# Patient Record
Sex: MALE | Race: WHITE | NOT HISPANIC OR LATINO | Employment: PART TIME | URBAN - METROPOLITAN AREA
[De-identification: names, ages, dates, MRNs, and addresses within clinical notes are randomized per-mention and may not be internally consistent; named-entity substitution may affect disease eponyms.]

---

## 2018-08-24 ENCOUNTER — OFFICE VISIT (OUTPATIENT)
Dept: NEUROSURGERY | Facility: CLINIC | Age: 33
End: 2018-08-24

## 2018-08-24 VITALS — OXYGEN SATURATION: 99 % | WEIGHT: 155 LBS | RESPIRATION RATE: 18 BRPM | HEIGHT: 68 IN | BODY MASS INDEX: 23.49 KG/M2

## 2018-08-24 DIAGNOSIS — M41.20 SCOLIOSIS (AND KYPHOSCOLIOSIS), IDIOPATHIC: Primary | ICD-10-CM

## 2018-08-24 PROCEDURE — 99203 OFFICE O/P NEW LOW 30 MIN: CPT | Performed by: NEUROLOGICAL SURGERY

## 2018-08-24 RX ORDER — CARVEDILOL 6.25 MG/1
TABLET ORAL
Refills: 5 | COMMUNITY
Start: 2018-08-14 | End: 2019-01-28

## 2018-08-24 RX ORDER — ALBUTEROL SULFATE 90 UG/1
AEROSOL, METERED RESPIRATORY (INHALATION)
Refills: 2 | COMMUNITY
Start: 2018-08-13 | End: 2019-01-28

## 2018-08-24 RX ORDER — PREDNISONE 10 MG/1
TABLET ORAL
Refills: 0 | COMMUNITY
Start: 2018-07-30 | End: 2019-01-28

## 2018-08-24 RX ORDER — MELOXICAM 15 MG/1
TABLET ORAL DAILY
Refills: 0 | COMMUNITY
Start: 2018-07-30 | End: 2019-01-28

## 2018-08-24 RX ORDER — FLUTICASONE PROPIONATE 50 MCG
SPRAY, SUSPENSION (ML) NASAL
Refills: 3 | COMMUNITY
Start: 2018-08-21 | End: 2019-01-28

## 2018-08-24 RX ORDER — MONTELUKAST SODIUM 10 MG/1
TABLET ORAL
Refills: 6 | COMMUNITY
Start: 2018-08-21 | End: 2019-01-28

## 2018-08-24 NOTE — PROGRESS NOTES
NAME: CHANDLER KING   DOS: 2018  : 1985  PCP: Yinka Santiago DO    Chief Complaint:  Back pain  Chief Complaint   Patient presents with   • Neck Pain       History of Present Illness:  32 y.o. male   This is a 32-year-old gentleman who works at [employer] and has been off work since April.  His worker for 14 years and is experienced midthoracic back pain there is no evidence radicular component it began about 2 years ago and it's worse when working.  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  He is a smoker    PMHX  Allergies:  Allergies   Allergen Reactions   • Penicillins Unknown (See Comments)     Unknown reaction     Medications    Current Outpatient Prescriptions:   •  carvedilol (COREG) 6.25 MG tablet, TK 1 T PO BID, Disp: , Rfl: 5  •  fluticasone (FLONASE) 50 MCG/ACT nasal spray, USE 1 TO 2 SPRAYS IN BOTH NOSTRILS ONCE A DAY UTD, Disp: , Rfl: 3  •  meloxicam (MOBIC) 15 MG tablet, Take  by mouth Daily., Disp: , Rfl: 0  •  montelukast (SINGULAIR) 10 MG tablet, TK 1 T PO HS, Disp: , Rfl: 6  •  predniSONE (DELTASONE) 10 MG tablet, , Disp: , Rfl: 0  •  VENTOLIN  (90 Base) MCG/ACT inhaler, INL 1 TO 2 PUFFS PO QID PRN, Disp: , Rfl: 2  Past Medical History:  History reviewed. No pertinent past medical history.  Past Surgical History:  History reviewed. No pertinent surgical history.  Social Hx:  Social History   Substance Use Topics   • Smoking status: Never Smoker   • Smokeless tobacco: Never Used   • Alcohol use Yes     Family Hx:  Family History   Problem Relation Age of Onset   • Arthritis Mother    • Arthritis Father      Review of Systems:        Review of Systems   Constitutional: Negative for activity change, appetite change, chills, diaphoresis, fatigue, fever and unexpected  weight change.   HENT: Negative for congestion, dental problem, drooling, ear discharge, ear pain, facial swelling, hearing loss, mouth sores, nosebleeds, postnasal drip, rhinorrhea, sinus pressure, sneezing, sore throat, tinnitus, trouble swallowing and voice change.    Eyes: Negative for photophobia, pain, discharge, redness, itching and visual disturbance.   Respiratory: Negative for apnea, cough, choking, chest tightness, shortness of breath, wheezing and stridor.    Cardiovascular: Negative for chest pain, palpitations and leg swelling.   Gastrointestinal: Negative for abdominal distention, abdominal pain, anal bleeding, blood in stool, constipation, diarrhea, nausea, rectal pain and vomiting.   Endocrine: Negative for cold intolerance, heat intolerance, polydipsia, polyphagia and polyuria.   Genitourinary: Negative for decreased urine volume, difficulty urinating, dysuria, enuresis, flank pain, frequency, genital sores, hematuria and urgency.   Musculoskeletal: Positive for myalgias, neck pain and neck stiffness. Negative for arthralgias, back pain, gait problem and joint swelling.   Skin: Negative for color change, pallor, rash and wound.   Allergic/Immunologic: Negative for environmental allergies, food allergies and immunocompromised state.   Neurological: Negative for dizziness, tremors, seizures, syncope, facial asymmetry, speech difficulty, weakness, light-headedness, numbness and headaches.   Hematological: Negative for adenopathy. Does not bruise/bleed easily.   Psychiatric/Behavioral: Negative for agitation, behavioral problems, confusion, decreased concentration, dysphoric mood, hallucinations, self-injury, sleep disturbance and suicidal ideas. The patient is not nervous/anxious and is not hyperactive.    All other systems reviewed and are negative.   I have reviewed this note template and all pertinent parts of the review of systems social, family history, surgical history and medication  list        Physical Examination:  Vitals:    08/24/18 1542   Resp: 18   SpO2: 99%      General Appearance:   Well developed, well nourished, well groomed, alert, and cooperative.  Neurological examination:  Neurologic Exam  Vital signs were reviewed and documented in the chart  Patient appeared in good neurologic function with normal comprehension fluent speech  Mood and affect are normal  Sense of smell deferred    Pupils symmetric equally reactive funduscopic exam not visualized   Visual fields intact to confrontation  Extraocular movements intact  Face motor function is symmetric  Facial sensations normal  Hearing intact to finger rub hearing intact to finger rub  Tongue is midline  Palate symmetric  Swallowing normal  Shoulder shrug normal    Muscle bulk and tone normal  5 out of 5 strength no motor drift  Gait normal intact  Negative Romberg  No clonus long tract signs or myelopathy    Reflexes symmetric  No edema noted and extremities skin appears normal  No thoracic sensory level vibratory sensations normal  Straight leg raise sign absent  No signs of intrinsic hip dysfunction  Back is without any lesions or abnormality  Feet are warm and well perfused  Scoliotic curvature noted which is moderate      Review of Imaging/DATA:  I reviewed MRIs of the thoracic spine I see no surgical lesions the neural foramina are open there is a Schmorl's node in the lower thoracic area but no edema  Diagnoses/Plan:    Mr. Knight is a 32 y.o. male   Thoracic back pain some scoliosis this is nonsurgical.  I recommended referral to and agree with the recommendations for interventional pain management of explained about yoga heat massage I's traction etc. if he hits a brick wall regarding his ongoing management of this condition perhaps a referral to a scoliosis specialist given his relatively young age would be in order but I still believe that he is unlikely to be a surgical candidate.  It would be a pleasure to see him back  anytime in the future I counseled him for 20 minutes on smoking cessation as well

## 2019-01-28 ENCOUNTER — APPOINTMENT (OUTPATIENT)
Dept: CT IMAGING | Facility: HOSPITAL | Age: 34
End: 2019-01-28

## 2019-01-28 ENCOUNTER — HOSPITAL ENCOUNTER (INPATIENT)
Facility: HOSPITAL | Age: 34
LOS: 3 days | Discharge: HOME | End: 2019-01-31
Attending: EMERGENCY MEDICINE | Admitting: INTERNAL MEDICINE

## 2019-01-28 DIAGNOSIS — R56.9 ALCOHOL WITHDRAWAL SEIZURE WITH COMPLICATION (HCC): Primary | ICD-10-CM

## 2019-01-28 DIAGNOSIS — F10.939 ALCOHOL WITHDRAWAL SEIZURE WITH COMPLICATION (HCC): Primary | ICD-10-CM

## 2019-01-28 PROBLEM — D69.6 THROMBOCYTOPENIA (HCC): Status: ACTIVE | Noted: 2019-01-28

## 2019-01-28 PROBLEM — F19.10 POLYSUBSTANCE ABUSE: Status: ACTIVE | Noted: 2019-01-28

## 2019-01-28 PROBLEM — R74.8 ELEVATED LIVER ENZYMES: Status: ACTIVE | Noted: 2019-01-28

## 2019-01-28 PROBLEM — K70.10 ALCOHOLIC HEPATITIS: Status: ACTIVE | Noted: 2019-01-28

## 2019-01-28 LAB
ALBUMIN SERPL-MCNC: 4.49 G/DL (ref 3.2–4.8)
ALBUMIN/GLOB SERPL: 1.8 G/DL (ref 1.5–2.5)
ALP SERPL-CCNC: 173 U/L (ref 25–100)
ALT SERPL W P-5'-P-CCNC: 176 U/L (ref 7–40)
ANION GAP SERPL CALCULATED.3IONS-SCNC: 13 MMOL/L (ref 3–11)
APTT PPP: 32.3 SECONDS (ref 24–37)
AST SERPL-CCNC: 212 U/L (ref 0–33)
BASOPHILS # BLD AUTO: 0.09 10*3/MM3 (ref 0–0.2)
BASOPHILS NFR BLD AUTO: 0.9 % (ref 0–1)
BILIRUB SERPL-MCNC: 1.1 MG/DL (ref 0.3–1.2)
BUN BLD-MCNC: 6 MG/DL (ref 9–23)
BUN/CREAT SERPL: 7.9 (ref 7–25)
CALCIUM SPEC-SCNC: 8.8 MG/DL (ref 8.7–10.4)
CHLORIDE SERPL-SCNC: 98 MMOL/L (ref 99–109)
CO2 SERPL-SCNC: 21 MMOL/L (ref 20–31)
CREAT BLD-MCNC: 0.76 MG/DL (ref 0.6–1.3)
DEPRECATED RDW RBC AUTO: 48.4 FL (ref 37–54)
EOSINOPHIL # BLD AUTO: 0.05 10*3/MM3 (ref 0–0.3)
EOSINOPHIL NFR BLD AUTO: 0.5 % (ref 0–3)
ERYTHROCYTE [DISTWIDTH] IN BLOOD BY AUTOMATED COUNT: 14.2 % (ref 11.3–14.5)
GFR SERPL CREATININE-BSD FRML MDRD: 119 ML/MIN/1.73
GLOBULIN UR ELPH-MCNC: 2.5 GM/DL
GLUCOSE BLD-MCNC: 137 MG/DL (ref 70–100)
HCT VFR BLD AUTO: 48.8 % (ref 38.9–50.9)
HGB BLD-MCNC: 17.1 G/DL (ref 13.1–17.5)
HOLD SPECIMEN: NORMAL
HOLD SPECIMEN: NORMAL
IMM GRANULOCYTES # BLD AUTO: 0.03 10*3/MM3 (ref 0–0.03)
IMM GRANULOCYTES NFR BLD AUTO: 0.3 % (ref 0–0.6)
INR PPP: 0.95 (ref 0.85–1.16)
INR PPP: 1.02 (ref 0.85–1.16)
LARGE PLATELETS: NORMAL
LIPASE SERPL-CCNC: 97 U/L (ref 6–51)
LYMPHOCYTES # BLD AUTO: 1.04 10*3/MM3 (ref 0.6–4.8)
LYMPHOCYTES NFR BLD AUTO: 10 % (ref 24–44)
MAGNESIUM SERPL-MCNC: 2.1 MG/DL (ref 1.3–2.7)
MCH RBC QN AUTO: 32.6 PG (ref 27–31)
MCHC RBC AUTO-ENTMCNC: 35 G/DL (ref 32–36)
MCV RBC AUTO: 93.1 FL (ref 80–99)
MONOCYTES # BLD AUTO: 1.07 10*3/MM3 (ref 0–1)
MONOCYTES NFR BLD AUTO: 10.3 % (ref 0–12)
NEUTROPHILS # BLD AUTO: 8.15 10*3/MM3 (ref 1.5–8.3)
NEUTROPHILS NFR BLD AUTO: 78.3 % (ref 41–71)
NRBC BLD AUTO-RTO: 0 /100 WBC (ref 0–0)
PLATELET # BLD AUTO: 50 10*3/MM3 (ref 150–450)
POTASSIUM BLD-SCNC: 4.4 MMOL/L (ref 3.5–5.5)
PROT SERPL-MCNC: 7 G/DL (ref 5.7–8.2)
PROTHROMBIN TIME: 12.2 SECONDS (ref 11.2–14.3)
PROTHROMBIN TIME: 12.9 SECONDS (ref 11.2–14.3)
RBC # BLD AUTO: 5.24 10*6/MM3 (ref 4.2–5.76)
RBC MORPH BLD: NORMAL
SODIUM BLD-SCNC: 132 MMOL/L (ref 132–146)
TROPONIN I SERPL-MCNC: <0.006 NG/ML
TSH SERPL DL<=0.05 MIU/L-ACNC: 2.43 MIU/ML (ref 0.35–5.35)
WBC MORPH BLD: NORMAL
WBC NRBC COR # BLD: 10.4 10*3/MM3 (ref 3.5–10.8)
WHOLE BLOOD HOLD SPECIMEN: NORMAL
WHOLE BLOOD HOLD SPECIMEN: NORMAL

## 2019-01-28 PROCEDURE — 85730 THROMBOPLASTIN TIME PARTIAL: CPT | Performed by: EMERGENCY MEDICINE

## 2019-01-28 PROCEDURE — 85610 PROTHROMBIN TIME: CPT | Performed by: FAMILY MEDICINE

## 2019-01-28 PROCEDURE — 99285 EMERGENCY DEPT VISIT HI MDM: CPT

## 2019-01-28 PROCEDURE — 80306 DRUG TEST PRSMV INSTRMNT: CPT | Performed by: NURSE PRACTITIONER

## 2019-01-28 PROCEDURE — 83735 ASSAY OF MAGNESIUM: CPT | Performed by: EMERGENCY MEDICINE

## 2019-01-28 PROCEDURE — 85025 COMPLETE CBC W/AUTO DIFF WBC: CPT | Performed by: EMERGENCY MEDICINE

## 2019-01-28 PROCEDURE — 84443 ASSAY THYROID STIM HORMONE: CPT | Performed by: NURSE PRACTITIONER

## 2019-01-28 PROCEDURE — 25010000002 LORAZEPAM PER 2 MG: Performed by: EMERGENCY MEDICINE

## 2019-01-28 PROCEDURE — 80053 COMPREHEN METABOLIC PANEL: CPT | Performed by: EMERGENCY MEDICINE

## 2019-01-28 PROCEDURE — 70450 CT HEAD/BRAIN W/O DYE: CPT

## 2019-01-28 PROCEDURE — 99223 1ST HOSP IP/OBS HIGH 75: CPT | Performed by: FAMILY MEDICINE

## 2019-01-28 PROCEDURE — 85007 BL SMEAR W/DIFF WBC COUNT: CPT | Performed by: EMERGENCY MEDICINE

## 2019-01-28 PROCEDURE — 83690 ASSAY OF LIPASE: CPT | Performed by: EMERGENCY MEDICINE

## 2019-01-28 PROCEDURE — 93005 ELECTROCARDIOGRAM TRACING: CPT | Performed by: EMERGENCY MEDICINE

## 2019-01-28 PROCEDURE — 25010000002 LORAZEPAM PER 2 MG: Performed by: NURSE PRACTITIONER

## 2019-01-28 PROCEDURE — 84484 ASSAY OF TROPONIN QUANT: CPT | Performed by: EMERGENCY MEDICINE

## 2019-01-28 PROCEDURE — 25010000002 MAGNESIUM SULFATE PER 500 MG OF MAGNESIUM: Performed by: EMERGENCY MEDICINE

## 2019-01-28 PROCEDURE — 25010000002 THIAMINE PER 100 MG: Performed by: EMERGENCY MEDICINE

## 2019-01-28 PROCEDURE — 85610 PROTHROMBIN TIME: CPT | Performed by: EMERGENCY MEDICINE

## 2019-01-28 RX ORDER — LORAZEPAM 2 MG/ML
1 INJECTION INTRAMUSCULAR ONCE
Status: COMPLETED | OUTPATIENT
Start: 2019-01-28 | End: 2019-01-28

## 2019-01-28 RX ORDER — DIAZEPAM 5 MG/1
20 TABLET ORAL ONCE
Status: COMPLETED | OUTPATIENT
Start: 2019-01-28 | End: 2019-01-28

## 2019-01-28 RX ORDER — LORAZEPAM 1 MG/1
1 TABLET ORAL
Status: DISCONTINUED | OUTPATIENT
Start: 2019-01-28 | End: 2019-01-31 | Stop reason: HOSPADM

## 2019-01-28 RX ORDER — LORAZEPAM 2 MG/ML
0.5 INJECTION INTRAMUSCULAR ONCE
Status: COMPLETED | OUTPATIENT
Start: 2019-01-28 | End: 2019-01-28

## 2019-01-28 RX ORDER — FOLIC ACID 1 MG/1
1 TABLET ORAL DAILY
Status: DISCONTINUED | OUTPATIENT
Start: 2019-01-29 | End: 2019-01-31 | Stop reason: HOSPADM

## 2019-01-28 RX ORDER — SODIUM CHLORIDE 0.9 % (FLUSH) 0.9 %
3 SYRINGE (ML) INJECTION EVERY 12 HOURS SCHEDULED
Status: DISCONTINUED | OUTPATIENT
Start: 2019-01-28 | End: 2019-01-31 | Stop reason: HOSPADM

## 2019-01-28 RX ORDER — SODIUM CHLORIDE 0.9 % (FLUSH) 0.9 %
3-10 SYRINGE (ML) INJECTION AS NEEDED
Status: DISCONTINUED | OUTPATIENT
Start: 2019-01-28 | End: 2019-01-31 | Stop reason: HOSPADM

## 2019-01-28 RX ORDER — ALBUTEROL SULFATE 90 UG/1
2 AEROSOL, METERED RESPIRATORY (INHALATION) EVERY 4 HOURS PRN
COMMUNITY
End: 2023-03-16 | Stop reason: SDUPTHER

## 2019-01-28 RX ORDER — LORAZEPAM 2 MG/ML
2 INJECTION INTRAMUSCULAR
Status: DISCONTINUED | OUTPATIENT
Start: 2019-01-28 | End: 2019-01-31 | Stop reason: HOSPADM

## 2019-01-28 RX ORDER — MULTIPLE VITAMINS W/ MINERALS TAB 9MG-400MCG
1 TAB ORAL DAILY
Status: DISCONTINUED | OUTPATIENT
Start: 2019-01-29 | End: 2019-01-31 | Stop reason: HOSPADM

## 2019-01-28 RX ORDER — BENZONATATE 100 MG/1
100 CAPSULE ORAL ONCE
Status: COMPLETED | OUTPATIENT
Start: 2019-01-28 | End: 2019-01-28

## 2019-01-28 RX ORDER — LORAZEPAM 2 MG/ML
1 INJECTION INTRAMUSCULAR
Status: DISCONTINUED | OUTPATIENT
Start: 2019-01-28 | End: 2019-01-31 | Stop reason: HOSPADM

## 2019-01-28 RX ORDER — LORAZEPAM 1 MG/1
2 TABLET ORAL
Status: DISCONTINUED | OUTPATIENT
Start: 2019-01-28 | End: 2019-01-31 | Stop reason: HOSPADM

## 2019-01-28 RX ORDER — MELOXICAM 15 MG/1
15 TABLET ORAL DAILY
COMMUNITY
End: 2019-01-31 | Stop reason: HOSPADM

## 2019-01-28 RX ORDER — SODIUM CHLORIDE 9 MG/ML
100 INJECTION, SOLUTION INTRAVENOUS CONTINUOUS
Status: DISCONTINUED | OUTPATIENT
Start: 2019-01-28 | End: 2019-01-31 | Stop reason: HOSPADM

## 2019-01-28 RX ORDER — NICOTINE 21 MG/24HR
1 PATCH, TRANSDERMAL 24 HOURS TRANSDERMAL
Status: DISCONTINUED | OUTPATIENT
Start: 2019-01-28 | End: 2019-01-31 | Stop reason: HOSPADM

## 2019-01-28 RX ORDER — SODIUM CHLORIDE 0.9 % (FLUSH) 0.9 %
10 SYRINGE (ML) INJECTION AS NEEDED
Status: DISCONTINUED | OUTPATIENT
Start: 2019-01-28 | End: 2019-01-31 | Stop reason: HOSPADM

## 2019-01-28 RX ADMIN — LORAZEPAM 2 MG: 2 INJECTION INTRAMUSCULAR; INTRAVENOUS at 23:12

## 2019-01-28 RX ADMIN — LORAZEPAM 1 MG: 2 INJECTION, SOLUTION INTRAMUSCULAR; INTRAVENOUS at 20:55

## 2019-01-28 RX ADMIN — LORAZEPAM 2 MG: 2 INJECTION INTRAMUSCULAR; INTRAVENOUS at 22:15

## 2019-01-28 RX ADMIN — SODIUM CHLORIDE 100 ML/HR: 9 INJECTION, SOLUTION INTRAVENOUS at 22:08

## 2019-01-28 RX ADMIN — LORAZEPAM 2 MG: 2 INJECTION INTRAMUSCULAR; INTRAVENOUS at 22:30

## 2019-01-28 RX ADMIN — LORAZEPAM 0.5 MG: 2 INJECTION, SOLUTION INTRAMUSCULAR; INTRAVENOUS at 19:25

## 2019-01-28 RX ADMIN — LORAZEPAM 2 MG: 2 INJECTION INTRAMUSCULAR; INTRAVENOUS at 23:33

## 2019-01-28 RX ADMIN — DIAZEPAM 20 MG: 5 TABLET ORAL at 22:42

## 2019-01-28 RX ADMIN — LORAZEPAM 2 MG: 2 INJECTION INTRAMUSCULAR; INTRAVENOUS at 22:55

## 2019-01-28 RX ADMIN — LORAZEPAM 2 MG: 2 INJECTION INTRAMUSCULAR; INTRAVENOUS at 22:08

## 2019-01-28 RX ADMIN — BENZONATATE 100 MG: 100 CAPSULE ORAL at 17:45

## 2019-01-28 RX ADMIN — NICOTINE 1 PATCH: 21 PATCH, EXTENDED RELEASE TRANSDERMAL at 20:58

## 2019-01-28 RX ADMIN — LORAZEPAM 2 MG: 2 INJECTION INTRAMUSCULAR; INTRAVENOUS at 22:42

## 2019-01-28 RX ADMIN — LORAZEPAM 1 MG: 2 INJECTION, SOLUTION INTRAMUSCULAR; INTRAVENOUS at 15:22

## 2019-01-28 RX ADMIN — FOLIC ACID 1000 ML/HR: 5 INJECTION, SOLUTION INTRAMUSCULAR; INTRAVENOUS; SUBCUTANEOUS at 15:24

## 2019-01-28 RX ADMIN — LORAZEPAM 2 MG: 2 INJECTION INTRAMUSCULAR; INTRAVENOUS at 23:45

## 2019-01-28 RX ADMIN — SODIUM CHLORIDE, PRESERVATIVE FREE 3 ML: 5 INJECTION INTRAVENOUS at 22:50

## 2019-01-29 ENCOUNTER — APPOINTMENT (OUTPATIENT)
Dept: NEUROLOGY | Facility: HOSPITAL | Age: 34
End: 2019-01-29
Attending: INTERNAL MEDICINE

## 2019-01-29 ENCOUNTER — APPOINTMENT (OUTPATIENT)
Dept: GENERAL RADIOLOGY | Facility: HOSPITAL | Age: 34
End: 2019-01-29

## 2019-01-29 PROBLEM — F10.931 ALCOHOL WITHDRAWAL WITH DELIRIUM: Status: ACTIVE | Noted: 2019-01-29

## 2019-01-29 LAB
ALBUMIN SERPL-MCNC: 3.48 G/DL (ref 3.2–4.8)
ALBUMIN/GLOB SERPL: 1.4 G/DL (ref 1.5–2.5)
ALP SERPL-CCNC: 135 U/L (ref 25–100)
ALT SERPL W P-5'-P-CCNC: 127 U/L (ref 7–40)
AMPHET+METHAMPHET UR QL: NEGATIVE
AMPHETAMINES UR QL: NEGATIVE
ANION GAP SERPL CALCULATED.3IONS-SCNC: 6 MMOL/L (ref 3–11)
AST SERPL-CCNC: 140 U/L (ref 0–33)
BARBITURATES UR QL SCN: NEGATIVE
BENZODIAZ UR QL SCN: POSITIVE
BILIRUB SERPL-MCNC: 1.1 MG/DL (ref 0.3–1.2)
BUN BLD-MCNC: 8 MG/DL (ref 9–23)
BUN/CREAT SERPL: 13.8 (ref 7–25)
BUPRENORPHINE SERPL-MCNC: NEGATIVE NG/ML
CALCIUM SPEC-SCNC: 7.8 MG/DL (ref 8.7–10.4)
CANNABINOIDS SERPL QL: NEGATIVE
CHLORIDE SERPL-SCNC: 105 MMOL/L (ref 99–109)
CO2 SERPL-SCNC: 23 MMOL/L (ref 20–31)
COCAINE UR QL: NEGATIVE
CREAT BLD-MCNC: 0.58 MG/DL (ref 0.6–1.3)
GFR SERPL CREATININE-BSD FRML MDRD: >150 ML/MIN/1.73
GLOBULIN UR ELPH-MCNC: 2.4 GM/DL
GLUCOSE BLD-MCNC: 76 MG/DL (ref 70–100)
GLUCOSE BLDC GLUCOMTR-MCNC: 69 MG/DL (ref 70–130)
GLUCOSE BLDC GLUCOMTR-MCNC: 73 MG/DL (ref 70–130)
GLUCOSE BLDC GLUCOMTR-MCNC: 80 MG/DL (ref 70–130)
INR PPP: 1 (ref 0.85–1.16)
METHADONE UR QL SCN: NEGATIVE
OPIATES UR QL: NEGATIVE
OXYCODONE UR QL SCN: NEGATIVE
PCP UR QL SCN: NEGATIVE
POTASSIUM BLD-SCNC: 3.5 MMOL/L (ref 3.5–5.5)
PROPOXYPH UR QL: NEGATIVE
PROT SERPL-MCNC: 5.9 G/DL (ref 5.7–8.2)
PROTHROMBIN TIME: 12.7 SECONDS (ref 11.2–14.3)
SODIUM BLD-SCNC: 134 MMOL/L (ref 132–146)
TRICYCLICS UR QL SCN: NEGATIVE

## 2019-01-29 PROCEDURE — 25010000002 THIAMINE PER 100 MG: Performed by: INTERNAL MEDICINE

## 2019-01-29 PROCEDURE — 85610 PROTHROMBIN TIME: CPT | Performed by: INTERNAL MEDICINE

## 2019-01-29 PROCEDURE — 25010000002 ONDANSETRON PER 1 MG: Performed by: NURSE PRACTITIONER

## 2019-01-29 PROCEDURE — 25010000002 ENOXAPARIN PER 10 MG: Performed by: INTERNAL MEDICINE

## 2019-01-29 PROCEDURE — 71046 X-RAY EXAM CHEST 2 VIEWS: CPT

## 2019-01-29 PROCEDURE — 99233 SBSQ HOSP IP/OBS HIGH 50: CPT | Performed by: INTERNAL MEDICINE

## 2019-01-29 PROCEDURE — 80053 COMPREHEN METABOLIC PANEL: CPT | Performed by: INTERNAL MEDICINE

## 2019-01-29 PROCEDURE — 25010000002 LORAZEPAM PER 2 MG: Performed by: NURSE PRACTITIONER

## 2019-01-29 PROCEDURE — 95819 EEG AWAKE AND ASLEEP: CPT

## 2019-01-29 PROCEDURE — 82962 GLUCOSE BLOOD TEST: CPT

## 2019-01-29 RX ORDER — DIAZEPAM 5 MG/1
10 TABLET ORAL EVERY 8 HOURS
Status: DISCONTINUED | OUTPATIENT
Start: 2019-01-29 | End: 2019-01-31

## 2019-01-29 RX ORDER — ONDANSETRON 2 MG/ML
4 INJECTION INTRAMUSCULAR; INTRAVENOUS EVERY 6 HOURS PRN
Status: DISCONTINUED | OUTPATIENT
Start: 2019-01-29 | End: 2019-01-31 | Stop reason: HOSPADM

## 2019-01-29 RX ADMIN — MULTIPLE VITAMINS W/ MINERALS TAB 1 TABLET: TAB ORAL at 09:58

## 2019-01-29 RX ADMIN — DIAZEPAM 10 MG: 5 TABLET ORAL at 09:58

## 2019-01-29 RX ADMIN — SODIUM CHLORIDE, PRESERVATIVE FREE 3 ML: 5 INJECTION INTRAVENOUS at 08:26

## 2019-01-29 RX ADMIN — SODIUM CHLORIDE 100 ML/HR: 9 INJECTION, SOLUTION INTRAVENOUS at 07:24

## 2019-01-29 RX ADMIN — LORAZEPAM 2 MG: 2 INJECTION INTRAMUSCULAR; INTRAVENOUS at 04:17

## 2019-01-29 RX ADMIN — LORAZEPAM 2 MG: 2 INJECTION INTRAMUSCULAR; INTRAVENOUS at 04:45

## 2019-01-29 RX ADMIN — LORAZEPAM 2 MG: 2 INJECTION INTRAMUSCULAR; INTRAVENOUS at 05:45

## 2019-01-29 RX ADMIN — LORAZEPAM 2 MG: 2 INJECTION INTRAMUSCULAR; INTRAVENOUS at 07:15

## 2019-01-29 RX ADMIN — SODIUM CHLORIDE, PRESERVATIVE FREE 3 ML: 5 INJECTION INTRAVENOUS at 21:07

## 2019-01-29 RX ADMIN — LORAZEPAM 2 MG: 2 INJECTION INTRAMUSCULAR; INTRAVENOUS at 10:43

## 2019-01-29 RX ADMIN — LORAZEPAM 2 MG: 2 INJECTION INTRAMUSCULAR; INTRAVENOUS at 04:32

## 2019-01-29 RX ADMIN — LORAZEPAM 2 MG: 2 INJECTION INTRAMUSCULAR; INTRAVENOUS at 07:50

## 2019-01-29 RX ADMIN — ENOXAPARIN SODIUM 40 MG: 40 INJECTION SUBCUTANEOUS at 09:59

## 2019-01-29 RX ADMIN — LORAZEPAM 2 MG: 2 INJECTION INTRAMUSCULAR; INTRAVENOUS at 14:48

## 2019-01-29 RX ADMIN — LORAZEPAM 2 MG: 2 INJECTION INTRAMUSCULAR; INTRAVENOUS at 15:17

## 2019-01-29 RX ADMIN — FOLIC ACID 1 MG: 1 TABLET ORAL at 09:58

## 2019-01-29 RX ADMIN — LORAZEPAM 2 MG: 2 INJECTION INTRAMUSCULAR; INTRAVENOUS at 09:58

## 2019-01-29 RX ADMIN — LORAZEPAM 2 MG: 2 INJECTION INTRAMUSCULAR; INTRAVENOUS at 02:18

## 2019-01-29 RX ADMIN — THIAMINE HYDROCHLORIDE 500 MG: 100 INJECTION, SOLUTION INTRAMUSCULAR; INTRAVENOUS at 10:19

## 2019-01-29 RX ADMIN — LORAZEPAM 2 MG: 2 INJECTION INTRAMUSCULAR; INTRAVENOUS at 07:38

## 2019-01-29 RX ADMIN — LORAZEPAM 2 MG: 2 INJECTION INTRAMUSCULAR; INTRAVENOUS at 06:29

## 2019-01-29 RX ADMIN — LORAZEPAM 2 MG: 2 INJECTION INTRAMUSCULAR; INTRAVENOUS at 07:27

## 2019-01-29 RX ADMIN — NICOTINE 1 PATCH: 21 PATCH, EXTENDED RELEASE TRANSDERMAL at 08:27

## 2019-01-29 RX ADMIN — ONDANSETRON 4 MG: 2 INJECTION INTRAMUSCULAR; INTRAVENOUS at 23:47

## 2019-01-29 RX ADMIN — LORAZEPAM 2 MG: 1 TABLET ORAL at 21:07

## 2019-01-29 RX ADMIN — LORAZEPAM 2 MG: 2 INJECTION INTRAMUSCULAR; INTRAVENOUS at 06:04

## 2019-01-29 RX ADMIN — LORAZEPAM 2 MG: 2 INJECTION INTRAMUSCULAR; INTRAVENOUS at 18:27

## 2019-01-29 RX ADMIN — DIAZEPAM 10 MG: 5 TABLET ORAL at 18:34

## 2019-01-29 RX ADMIN — LORAZEPAM 2 MG: 2 INJECTION INTRAMUSCULAR; INTRAVENOUS at 03:59

## 2019-01-30 LAB
ALBUMIN SERPL-MCNC: 3.44 G/DL (ref 3.2–4.8)
ALBUMIN/GLOB SERPL: 1.5 G/DL (ref 1.5–2.5)
ALP SERPL-CCNC: 143 U/L (ref 25–100)
ALT SERPL W P-5'-P-CCNC: 130 U/L (ref 7–40)
ANION GAP SERPL CALCULATED.3IONS-SCNC: 4 MMOL/L (ref 3–11)
AST SERPL-CCNC: 133 U/L (ref 0–33)
BILIRUB SERPL-MCNC: 0.6 MG/DL (ref 0.3–1.2)
BUN BLD-MCNC: 8 MG/DL (ref 9–23)
BUN/CREAT SERPL: 13.1 (ref 7–25)
CALCIUM SPEC-SCNC: 8.3 MG/DL (ref 8.7–10.4)
CHLORIDE SERPL-SCNC: 105 MMOL/L (ref 99–109)
CO2 SERPL-SCNC: 26 MMOL/L (ref 20–31)
CREAT BLD-MCNC: 0.61 MG/DL (ref 0.6–1.3)
DEPRECATED RDW RBC AUTO: 51.4 FL (ref 37–54)
ERYTHROCYTE [DISTWIDTH] IN BLOOD BY AUTOMATED COUNT: 14.4 % (ref 11.3–14.5)
GFR SERPL CREATININE-BSD FRML MDRD: >150 ML/MIN/1.73
GLOBULIN UR ELPH-MCNC: 2.4 GM/DL
GLUCOSE BLD-MCNC: 88 MG/DL (ref 70–100)
GLUCOSE BLDC GLUCOMTR-MCNC: 131 MG/DL (ref 70–130)
GLUCOSE BLDC GLUCOMTR-MCNC: 80 MG/DL (ref 70–130)
GLUCOSE BLDC GLUCOMTR-MCNC: 90 MG/DL (ref 70–130)
GLUCOSE BLDC GLUCOMTR-MCNC: 93 MG/DL (ref 70–130)
HCT VFR BLD AUTO: 43.1 % (ref 38.9–50.9)
HGB BLD-MCNC: 14.3 G/DL (ref 13.1–17.5)
INR PPP: 1 (ref 0.85–1.16)
MCH RBC QN AUTO: 32 PG (ref 27–31)
MCHC RBC AUTO-ENTMCNC: 33.2 G/DL (ref 32–36)
MCV RBC AUTO: 96.4 FL (ref 80–99)
PLATELET # BLD AUTO: 58 10*3/MM3 (ref 150–450)
PMV BLD AUTO: 13 FL (ref 6–12)
POTASSIUM BLD-SCNC: 3.6 MMOL/L (ref 3.5–5.5)
PROT SERPL-MCNC: 5.8 G/DL (ref 5.7–8.2)
PROTHROMBIN TIME: 12.7 SECONDS (ref 11.2–14.3)
RBC # BLD AUTO: 4.47 10*6/MM3 (ref 4.2–5.76)
SODIUM BLD-SCNC: 135 MMOL/L (ref 132–146)
WBC NRBC COR # BLD: 6.18 10*3/MM3 (ref 3.5–10.8)

## 2019-01-30 PROCEDURE — 25010000002 THIAMINE PER 100 MG: Performed by: INTERNAL MEDICINE

## 2019-01-30 PROCEDURE — 85027 COMPLETE CBC AUTOMATED: CPT | Performed by: INTERNAL MEDICINE

## 2019-01-30 PROCEDURE — 82962 GLUCOSE BLOOD TEST: CPT

## 2019-01-30 PROCEDURE — 25010000002 ENOXAPARIN PER 10 MG: Performed by: INTERNAL MEDICINE

## 2019-01-30 PROCEDURE — 99232 SBSQ HOSP IP/OBS MODERATE 35: CPT | Performed by: NURSE PRACTITIONER

## 2019-01-30 PROCEDURE — 80053 COMPREHEN METABOLIC PANEL: CPT | Performed by: INTERNAL MEDICINE

## 2019-01-30 PROCEDURE — 85610 PROTHROMBIN TIME: CPT | Performed by: INTERNAL MEDICINE

## 2019-01-30 RX ORDER — ACETAMINOPHEN 325 MG/1
650 TABLET ORAL EVERY 6 HOURS PRN
Status: DISCONTINUED | OUTPATIENT
Start: 2019-01-30 | End: 2019-01-31 | Stop reason: HOSPADM

## 2019-01-30 RX ORDER — TRAMADOL HYDROCHLORIDE 50 MG/1
50 TABLET ORAL EVERY 6 HOURS PRN
Status: DISCONTINUED | OUTPATIENT
Start: 2019-01-30 | End: 2019-01-31 | Stop reason: HOSPADM

## 2019-01-30 RX ADMIN — DIAZEPAM 10 MG: 5 TABLET ORAL at 02:48

## 2019-01-30 RX ADMIN — MULTIPLE VITAMINS W/ MINERALS TAB 1 TABLET: TAB ORAL at 09:11

## 2019-01-30 RX ADMIN — NICOTINE 1 PATCH: 21 PATCH, EXTENDED RELEASE TRANSDERMAL at 09:11

## 2019-01-30 RX ADMIN — THIAMINE HYDROCHLORIDE 500 MG: 100 INJECTION, SOLUTION INTRAMUSCULAR; INTRAVENOUS at 09:11

## 2019-01-30 RX ADMIN — ENOXAPARIN SODIUM 40 MG: 40 INJECTION SUBCUTANEOUS at 09:11

## 2019-01-30 RX ADMIN — TRAMADOL HYDROCHLORIDE 50 MG: 50 TABLET, FILM COATED ORAL at 20:43

## 2019-01-30 RX ADMIN — TRAMADOL HYDROCHLORIDE 50 MG: 50 TABLET, FILM COATED ORAL at 14:29

## 2019-01-30 RX ADMIN — DIAZEPAM 10 MG: 5 TABLET ORAL at 17:38

## 2019-01-30 RX ADMIN — DIAZEPAM 10 MG: 5 TABLET ORAL at 09:11

## 2019-01-30 RX ADMIN — FOLIC ACID 1 MG: 1 TABLET ORAL at 09:11

## 2019-01-31 VITALS
HEART RATE: 100 BPM | OXYGEN SATURATION: 95 % | HEIGHT: 67 IN | SYSTOLIC BLOOD PRESSURE: 134 MMHG | DIASTOLIC BLOOD PRESSURE: 99 MMHG | RESPIRATION RATE: 18 BRPM | BODY MASS INDEX: 24.17 KG/M2 | WEIGHT: 154 LBS | TEMPERATURE: 97.9 F

## 2019-01-31 LAB — GLUCOSE BLDC GLUCOMTR-MCNC: 116 MG/DL (ref 70–130)

## 2019-01-31 PROCEDURE — 99239 HOSP IP/OBS DSCHRG MGMT >30: CPT | Performed by: INTERNAL MEDICINE

## 2019-01-31 PROCEDURE — 82962 GLUCOSE BLOOD TEST: CPT

## 2019-01-31 RX ORDER — MULTIPLE VITAMINS W/ MINERALS TAB 9MG-400MCG
1 TAB ORAL DAILY
Qty: 30 TABLET | Refills: 0 | Status: SHIPPED | OUTPATIENT
Start: 2019-01-31

## 2019-01-31 RX ORDER — DIAZEPAM 5 MG/1
5 TABLET ORAL EVERY 8 HOURS
Status: DISCONTINUED | OUTPATIENT
Start: 2019-01-31 | End: 2019-01-31 | Stop reason: HOSPADM

## 2019-01-31 RX ORDER — FOLIC ACID 1 MG/1
1 TABLET ORAL DAILY
Qty: 30 TABLET | Refills: 0 | Status: ON HOLD | OUTPATIENT
Start: 2019-01-31 | End: 2023-03-12 | Stop reason: SDUPTHER

## 2019-01-31 RX ORDER — DIAZEPAM 5 MG/1
5 TABLET ORAL 2 TIMES DAILY
Qty: 14 TABLET | Refills: 0 | Status: SHIPPED | OUTPATIENT
Start: 2019-01-31 | End: 2019-02-07 | Stop reason: SDUPTHER

## 2019-01-31 RX ADMIN — FOLIC ACID 1 MG: 1 TABLET ORAL at 09:07

## 2019-01-31 RX ADMIN — MULTIPLE VITAMINS W/ MINERALS TAB 1 TABLET: TAB ORAL at 09:06

## 2019-01-31 RX ADMIN — DIAZEPAM 10 MG: 5 TABLET ORAL at 01:24

## 2019-01-31 RX ADMIN — DIAZEPAM 5 MG: 5 TABLET ORAL at 09:07

## 2019-02-01 ENCOUNTER — READMISSION MANAGEMENT (OUTPATIENT)
Dept: CALL CENTER | Facility: HOSPITAL | Age: 34
End: 2019-02-01

## 2019-02-01 NOTE — OUTREACH NOTE
Prep Survey      Responses   Facility patient discharged from?  [facility]   Is patient eligible?  No   What are the reasons patient is not eligible?  Behavioral Health   Does the patient have one of the following disease processes/diagnoses(primary or secondary)?  Other   Prep survey completed?  Yes          Jenni Muro RN

## 2019-02-05 NOTE — PAYOR COMM NOTE
"Clinicals for inpatient admission 1/28-1/31  Member # [id number]    Admitted on 1/28, discharged on 1/31    Bell Estrella MD  (NPI: [id number])   ICD 10 Alcohol withdrawal seizure (CMS/HCC) [F10.239, R56.9]    Thank You,  Sushma Christianson RN  Utilization Review  [phone]  Fax [phone]    Chandler King (33 y.o. Male)     Date of Birth Social Security Number Address Home Phone MRN    1985  [address] [place], [place] [id number] [phone] [id number]    Church Marital Status          None        Admission Date Admission Type Admitting Provider Attending Provider Department, Room/Bed    1/28/19 Emergency Joni Prabhakar MD  [address], S453/1    Discharge Date Discharge Disposition Discharge Destination        1/31/2019 Home or Self Care              Attending Provider:  (none)   Allergies:  Penicillins    Isolation:  None   Infection:  None   Code Status:  Prior    Ht:  170.2 cm (67\")   Wt:  69.9 kg (154 lb)    Admission Cmt:  None   Principal Problem:  Alcohol withdrawal with delirium (CMS/HCC) [F10.231]                 Active Insurance as of 1/28/2019     Primary Coverage     Payor Plan Insurance Group Employer/Plan Group    ANTHEM MEDICAID ANTHEM MEDICAID [id number]     Payor Plan Address Payor Plan Phone Number Payor Plan Fax Number Effective Dates    [address] [address] [phone]  1/28/2019 - None Entered    [facility] [id number]       Subscriber Name Subscriber Birth Date Member ID       CHANDLER KING 1985 [id number]                 Emergency Contacts      (Rel.) Home Phone Work Phone Mobile Phone    Alphonso King (Father) [phone] -- --            Insurance Information                ANTHEM MEDICAID/ANTHEM MEDICAID Phone: [phone]    Subscriber: Chandler King Subscriber#: [id number]    Group#: [id number] Precert#:              History & Physical      Bell Estrella MD at 1/28/2019  7:34 PM              [facility] [place]" "[facility] Medicine Services  HISTORY AND PHYSICAL    Patient Name: Dung Knight  : 1985  MRN: [id number]  Primary Care Physician: Yinka Santiago DO  Date of admission: 2019      Subjective   Subjective     Chief Complaint: Seizure, LOC    HPI:    Dung Knight is a 33 y.o. male who presented to ED this afternoon after a witnessed seizure. Pt was lying down when he went unresponsive, and eyes deviated to Right with b/l upper and lower extremity posturing. Per family, pt stopped breathing and did not have a heart beat. Family initiated chest compressions with ROSC after two compressions.    PMHx: Cluster migraines: ETOH, tobacco; lifelong tremors according to pt's mother    Pt states he was \"sick all weekend\". Endorses N/V/D. Fever. Hx long term (two years) daily ETOH use (beer); Tobacco 3ppd. Pt unable to eat/drink this weekend \"as I felt so bad.\"      night pt endorses + Hallucinations - seeing spots -- took flexeril 5mg, phenergan 25mg, mobic 7.5mg -- 2/2 Right rib pain and h/a. Woke up this morning feeling \"pretty good.... Better than I had this weekend.\"    + Recent sick contacts; No recent vaccinations, or hx of seizure.    Review of Systems   Constitutional: Positive for activity change, appetite change, chills, fatigue and fever.   HENT: Negative for trouble swallowing.    Respiratory: Negative for shortness of breath.    Cardiovascular: Negative for chest pain.   Gastrointestinal: Positive for blood in stool, diarrhea, nausea and vomiting.   Musculoskeletal: Positive for myalgias.   Neurological: Positive for tremors and weakness.   Psychiatric/Behavioral: Positive for confusion.          Otherwise complete ROS reviewed and is negative except as mentioned in the HPI.    Personal History     Past Medical History:   Diagnosis Date   • Hypertension        Past Surgical History:   Procedure Laterality Date   • HERNIA REPAIR     • TESTICLE SURGERY         Family History: " family history includes Arthritis in his father and mother. Cousins: MS, Parkinsons Disease, RA, Cancer.    Social History:  reports that  has never smoked. he has never used smokeless tobacco. He reports that he drinks about 7.2 - 10.8 oz of alcohol per week. He reports that he uses drugs. Drug: Marijuana.  Social History     Social History Narrative   • Not on file       Medications:    Available home medication information reviewed.    (Not in a hospital admission)    Allergies   Allergen Reactions   • Penicillins Unknown (See Comments)     Unknown reaction       Objective   Objective     Vital Signs:   Temp:  [98.5 °F (36.9 °C)] 98.5 °F (36.9 °C)  Heart Rate:  [] 103  Resp:  [18] 18  BP: (109-149)/() 134/94        Physical Exam   Constitutional: He appears well-developed and well-nourished. He is cooperative. No distress.   HENT:   Head: Normocephalic and atraumatic.   Eyes: EOM are normal.   Neck: No tracheal deviation present.   Cardiovascular:   No murmur heard.  Tachy, HR 110s   Pulmonary/Chest: Effort normal and breath sounds normal. No respiratory distress.   Abdominal: Soft. Bowel sounds are normal. He exhibits no distension.   Musculoskeletal: He exhibits no edema.   Guarding Right sided chest   Neurological: He is alert.   + BUE tremors - resting and intention   Skin: Skin is warm. He is not diaphoretic.   Tattoos back, arms   Psychiatric: He has a normal mood and affect.          Results Reviewed:  I have personally reviewed current lab, radiology, and data and agree.    Results from last 7 days   Lab Units 01/28/19  1420   WBC 10*3/mm3 10.40   HEMOGLOBIN g/dL 17.1   HEMATOCRIT % 48.8   PLATELETS 10*3/mm3 50*   INR  0.95     Results from last 7 days   Lab Units 01/28/19  1420   SODIUM mmol/L 132   POTASSIUM mmol/L 4.4   CHLORIDE mmol/L 98*   CO2 mmol/L 21.0   BUN mg/dL 6*   CREATININE mg/dL 0.76   GLUCOSE mg/dL 137*   CALCIUM mg/dL 8.8   ALT (SGPT) U/L 176*   AST (SGOT) U/L 212*   TROPONIN  I ng/mL <0.006     Estimated Creatinine Clearance: 143.3 mL/min (by C-G formula based on SCr of 0.76 mg/dL).  Brief Urine Lab Results     None        No results found for: BNP  Imaging Results (last 24 hours)     Procedure Component Value Units Date/Time    CT Head Without Contrast [[id number]] Collected:  01/28/19 1632     Updated:  01/28/19 1632    Narrative:       EXAMINATION: CT HEAD WO CONTRAST-01/28/2019:      INDICATION: Headache, seizure.     TECHNIQUE: Multiple axial CT imaging was obtained of the head from the  skull base to the skull vertex without the administration of intravenous  contrast.     The radiation dose reduction device was turned on for each scan per the  ALARA (As Low as Reasonably Achievable) protocol.     COMPARISON: NONE.     FINDINGS: The parenchyma is unremarkable in appearance. No hemorrhage or  hydrocephalus. No mass, mass effect, or midline shift. No abnormal  extra-axial fluid collection is identified. The bony structures reveal  no evidence of osseous abnormality. The visualized paranasal sinuses are  clear. The mastoid air cells are patent.       Impression:       No acute intracranial abnormality identified.     D:  01/28/2019  E:  01/28/2019                      Assessment/Plan   Assessment / Plan     Active Hospital Problems    Diagnosis Date Noted   • **Alcoholic hepatitis [K70.10] 01/28/2019   • Alcohol withdrawal seizure (CMS/HCC) [F10.239, R56.9] 01/28/2019     Priority: High   • Thrombocytopenia (CMS/HCC) [D69.6] 01/28/2019   • Elevated liver enzymes [R74.8] 01/28/2019   • Polysubstance abuse (CMS/HCC) [F19.10] 01/28/2019     Alcoholic Hepatitis  Seizures, new onset (probable alcohol withdrawal seizures)  Polysubstance abuse  - CT scan (done in ED) - negative  - withdrawal protocol -- will need to max out the protocol.  Gave one dose of ativan IM due to lact of IV access, will restore access and ordered valium 20 mg PO X also.  May require ICU transfer if we are unable to  reverse his delirium on the floor.  - seizure precautions  - UDS  - Tex  - flu swab  - discharge planning: needs PCP, rehab  - EKG in am  - nicotine patch  - monitor Labs (BMP for Na in am)  - Monitor glucose before meals  - PT/INR  -Rally pack  Social work consult      Elevated Transaminases  AST > ALT  - IV fluid  - Monitor      Thrombocytopenia  - Monitor, likely from etoh      Pain, Right Ribs  - Cxr      DVT prophylaxis: sarah/scds d/t thrombocytopenia    CODE STATUS:  FULL CODE  Code Status and Medical Interventions:   Ordered at: 01/28/19 2032     Code Status:    CPR     Medical Interventions (Level of Support Prior to Arrest):    Full       INPATIENT status due to the need for care which can only be reasonably provided in an hospital setting such as possible need for aggressive/expedited ancillary services and/or consultation services, IV medications, close physician monitoring, and/or procedures.  In such, I feel patient’s risk for adverse outcomes and need for care warrant INPATIENT evaluation and predict the patient’s care encounter to likely last beyond 2 midnights.        Electronically signed by BLAINE Greco, 01/28/19, 7:34 PM.      Brief Attending Admission Attestation     I have seen and examined the patient, performing an independent face-to-face diagnostic evaluation with plan of care reviewed and developed with the advanced practice clinician BLAINE Baker.      Brief Summary Statement/HPI:   Dung Knight is a 33 y.o. male presents to [facility] today with a witnessed seizure at home.  The patient has a 2 year history of excessive alcohol consumption - 12-18 beers per day, recently cut back to 9 beers per day.  However, the patient what appeared to be a viral gastroenteritis over the weekend (per his wife) with N/V and watery diarrhea.  The patient's beer consumption was markedly reduced as a result on 1/26 and 1/27.  Today the patient had consumed 4 beers by noon and then stiffened out his  "arms and shook all over.  The patient's wife states he \"stopped breathing twice\" during this time.  After the seizure, the patient was weak and confused.      Here in the ER, tachycardic, hypertensive and tremulous.  , , AlkPhos 173.  INR 1.      Long discussion with wife and other family members (patient confused).  They were initially resisting a diagnosis of alcohol withdrawal seizure and alcohol withdrawal as the patient had maintained some alcohol consumption, however I pointed out that his drinking was markedly reduced due to his illness and his seizure occurred predictably at about the 48 hour bony.  They are more accepting of the diagnosis and treatment regimen at this point    However, patient agitated and got up, ripped out IV, very confused.      Attending Physical Exam:  Constitutional: Confused, attempting to \"program\" his tele box and   Eyes: PERRLA, sclerae anicteric, no conjunctival injection  HENT: NCAT, mucous membranes moist  Neck: Supple, no thyromegaly, no lymphadenopathy, trachea midline  Respiratory: Clear to auscultation bilaterally, nonlabored respirations   Cardiovascular: Regular/tachy, palpable pedal pulses bilaterally  Gastrointestinal: Positive bowel sounds, soft, mild RUQ TTP  Musculoskeletal: No bilateral ankle edema, no clubbing or cyanosis to extremities  Psychiatric: Delirious   Neurologic: Oriented only to self, tremulous, non focal  Skin: No rashes        Brief Assessment/Plan :  See above for further detailed assessment and plan developed with APC which I have reviewed and/or edited.      Electronically signed by Bell Estrella MD, 01/28/19, 11:04 PM.           Electronically signed by Bell Estrella MD at 1/28/2019 11:22 PM          Emergency Department Notes      Jonnathan Theodore PA at 1/28/2019  2:42 PM     Attestation signed by Bennett Izquierdo MD at 1/28/2019 11:09 PM          For this patient encounter, I reviewed the NP or PA documentation, treatment " plan, and medical decision making. Benentt Izquierdo MD 1/28/2019 11:09 PM                  Subjective   33-year-old white male arrives by EMS with possible seizure PTA.  According to family, he was lying down and turned blue with what is described as tonic upper and lower extremity posturing and cyanosis.  This lasted approximately 1-2 minutes.  Patient does drink alcohol and the family states that he drinks slowly 15 drinks per day.  His last drink was just an hour ago.  He had a total of 3 drink so far.  In the day, he will have 15 drinks.  Patient states he is anxious and has had tremors for several weeks.  Patient admits that he has had alcohol withdrawal in the past but denies any seizures in the past.  Patient denies any other drug use except marijuana.  He denies any IV drug use and has no somatic complaints with the exception of jitteriness.  According to EMS providers, he was postictal at the scene.        History provided by:  Patient, spouse and relative  Seizures   Seizure type:  Tonic  Initial focality:  None  Episode characteristics: stiffening    Episode characteristics: no abnormal movements, no combativeness, no incontinence and fully responsive    Return to baseline: yes    Severity:  Moderate  Timing:  Once  Progression:  Worsening  Context: not drug use    Recent head injury:  No recent head injuries      Review of Systems   Neurological: Positive for seizures.   All other systems reviewed and are negative.      Past Medical History:   Diagnosis Date   • Hypertension        Allergies   Allergen Reactions   • Penicillins Unknown (See Comments)     Unknown reaction       Past Surgical History:   Procedure Laterality Date   • HERNIA REPAIR     • TESTICLE SURGERY         Family History   Problem Relation Age of Onset   • Arthritis Mother    • Arthritis Father        Social History     Socioeconomic History   • Marital status:      Spouse name: Not on file   • Number of children: Not on file    • Years of education: Not on file   • Highest education level: Not on file   Tobacco Use   • Smoking status: Never Smoker   • Smokeless tobacco: Never Used   Substance and Sexual Activity   • Alcohol use: Yes     Alcohol/week: 7.2 - 10.8 oz     Types: 12 - 18 Cans of beer per week     Comment: 12 to 18 daily    • Drug use: Yes     Types: Marijuana   • Sexual activity: Defer           Objective   Physical Exam   Constitutional: He appears well-developed and well-nourished.   HENT:   Head: Normocephalic and atraumatic.   Eyes: Conjunctivae are normal.   Neck: Neck supple.   Cardiovascular: Normal rate, regular rhythm and normal heart sounds. Exam reveals no friction rub.   No murmur heard.  Pulmonary/Chest: Effort normal and breath sounds normal. No stridor. No respiratory distress. He has no wheezes.   Abdominal: Soft. Bowel sounds are normal. He exhibits no distension. There is no tenderness. There is no guarding.   Musculoskeletal: Normal range of motion. He exhibits no edema.   Normal range of motion of upper and lower extremities.  Normal strength of upper and lower extremities.   Neurological: He is alert.   Anxious with resting tremors of the upper and lower extremities.   Skin: Skin is warm and dry. Capillary refill takes less than 2 seconds. No rash noted.   Psychiatric: He has a normal mood and affect.   Nursing note and vitals reviewed.      Procedures          ED Course  ED Course as of Jan 28 1851   Mon Jan 28, 2019 1847 I spoke with Dr. Estrella.  She agrees with the plan in place.  [JI]      ED Course User Index  [JI] Jonnathan Theodore PA          Recent Results (from the past 24 hour(s))   Light Blue Top    Collection Time: 01/28/19  2:20 PM   Result Value Ref Range    Extra Tube hold for add-on    Green Top (Gel)    Collection Time: 01/28/19  2:20 PM   Result Value Ref Range    Extra Tube Hold for add-ons.    Lavender Top    Collection Time: 01/28/19  2:20 PM   Result Value Ref Range    Extra Tube hold  for add-on    Gold Top - SST    Collection Time: 01/28/19  2:20 PM   Result Value Ref Range    Extra Tube Hold for add-ons.    Comprehensive Metabolic Panel    Collection Time: 01/28/19  2:20 PM   Result Value Ref Range    Glucose 137 (H) 70 - 100 mg/dL    BUN 6 (L) 9 - 23 mg/dL    Creatinine 0.76 0.60 - 1.30 mg/dL    Sodium 132 132 - 146 mmol/L    Potassium 4.4 3.5 - 5.5 mmol/L    Chloride 98 (L) 99 - 109 mmol/L    CO2 21.0 20.0 - 31.0 mmol/L    Calcium 8.8 8.7 - 10.4 mg/dL    Total Protein 7.0 5.7 - 8.2 g/dL    Albumin 4.49 3.20 - 4.80 g/dL    ALT (SGPT) 176 (H) 7 - 40 U/L    AST (SGOT) 212 (H) 0 - 33 U/L    Alkaline Phosphatase 173 (H) 25 - 100 U/L    Total Bilirubin 1.1 0.3 - 1.2 mg/dL    eGFR Non African Amer 119 >60 mL/min/1.73    Globulin 2.5 gm/dL    A/G Ratio 1.8 1.5 - 2.5 g/dL    BUN/Creatinine Ratio 7.9 7.0 - 25.0    Anion Gap 13.0 (H) 3.0 - 11.0 mmol/L   Lipase    Collection Time: 01/28/19  2:20 PM   Result Value Ref Range    Lipase 97 (H) 6 - 51 U/L   Magnesium    Collection Time: 01/28/19  2:20 PM   Result Value Ref Range    Magnesium 2.1 1.3 - 2.7 mg/dL   Troponin    Collection Time: 01/28/19  2:20 PM   Result Value Ref Range    Troponin I <0.006 <=0.039 ng/mL   CBC Auto Differential    Collection Time: 01/28/19  2:20 PM   Result Value Ref Range    WBC 10.40 3.50 - 10.80 10*3/mm3    RBC 5.24 4.20 - 5.76 10*6/mm3    Hemoglobin 17.1 13.1 - 17.5 g/dL    Hematocrit 48.8 38.9 - 50.9 %    MCV 93.1 80.0 - 99.0 fL    MCH 32.6 (H) 27.0 - 31.0 pg    MCHC 35.0 32.0 - 36.0 g/dL    RDW 14.2 11.3 - 14.5 %    RDW-SD 48.4 37.0 - 54.0 fl    Platelets 50 (L) 150 - 450 10*3/mm3    Neutrophil % 78.3 (H) 41.0 - 71.0 %    Lymphocyte % 10.0 (L) 24.0 - 44.0 %    Monocyte % 10.3 0.0 - 12.0 %    Eosinophil % 0.5 0.0 - 3.0 %    Basophil % 0.9 0.0 - 1.0 %    Immature Grans % 0.3 0.0 - 0.6 %    Neutrophils, Absolute 8.15 1.50 - 8.30 10*3/mm3    Lymphocytes, Absolute 1.04 0.60 - 4.80 10*3/mm3    Monocytes, Absolute 1.07 (H) 0.00 -  1.00 10*3/mm3    Eosinophils, Absolute 0.05 0.00 - 0.30 10*3/mm3    Basophils, Absolute 0.09 0.00 - 0.20 10*3/mm3    Immature Grans, Absolute 0.03 0.00 - 0.03 10*3/mm3    nRBC 0.0 0.0 - 0.0 /100 WBC   Scan Slide    Collection Time: 01/28/19  2:20 PM   Result Value Ref Range    RBC Morphology Normal Normal    WBC Morphology Normal Normal    Large Platelets Slight/1+ None Seen   aPTT    Collection Time: 01/28/19  2:20 PM   Result Value Ref Range    PTT 32.3 24.0 - 37.0 seconds   Protime-INR    Collection Time: 01/28/19  2:20 PM   Result Value Ref Range    Protime 12.2 11.2 - 14.3 Seconds    INR 0.95 0.85 - 1.16     Note: In addition to lab results from this visit, the labs listed above may include labs taken at another facility or during a different encounter within the last 24 hours. Please correlate lab times with ED admission and discharge times for further clarification of the services performed during this visit.    CT Head Without Contrast   Preliminary Result   No acute intracranial abnormality identified.       D:  01/28/2019   E:  01/28/2019                    Vitals:    01/28/19 1645 01/28/19 1659 01/28/19 1728 01/28/19 1735   BP:   (!) 129/106    BP Location:       Patient Position:       Pulse: 107 98  105   Resp:       Temp:       TempSrc:       SpO2: 96% 96%  97%   Weight:       Height:         Medications   sodium chloride 0.9 % flush 10 mL (not administered)   LORazepam (ATIVAN) injection 0.5 mg (not administered)   multiple vitamin (M.V.I. Adult) 10 mL, thiamine (B-1) 100 mg, folic acid 1 mg, magnesium sulfate 2 g in sodium chloride 0.9 % 1,000 mL infusion (0 mL/hr Intravenous Stopped 1/28/19 1730)   LORazepam (ATIVAN) injection 1 mg (1 mg Intravenous Given 1/28/19 1522)   benzonatate (TESSALON) capsule 100 mg (100 mg Oral Given 1/28/19 1745)     ECG/EMG Results (last 24 hours)     Procedure Component Value Units Date/Time    ECG 12 Lead [[id number]] Collected:  01/28/19 1534     Updated:  01/28/19  1740        ECG 12 Lead                   [facility]      Final diagnoses:   Alcohol withdrawal seizure with complication (CMS/HCC)            Jonnathan Theodore, PA  01/28/19 9854      Electronically signed by Bennett Izquierdo MD at 1/28/2019 11:09 PM         ICU Vital Signs     Row Name 01/31/19 0905 01/31/19 0800 01/30/19 2054 01/30/19 1934 01/30/19 1800       Vitals    Temp  --  --  97.9 °F (36.6 °C)  --  --    Temp src  --  --  Axillary  --  --    Resp  18  --  16  --  --    Resp Rate Source  Visual  --  Visual  --  --    BP  134/99  --  --  130/91  --    Noninvasive MAP (mmHg)  --  --  --  103  --    BP Location  Right arm  --  --  --  --    BP Method  Automatic  --  --  --  --    Patient Position  Lying  --  --  --  --       Oxygen Therapy    Device (Oxygen Therapy)  --  room air  --  --  room air    Row Name 01/30/19 1600 01/30/19 1400 01/30/19 1200 01/30/19 1137 01/30/19 1000       Vitals    Temp  --  --  --  98.4 °F (36.9 °C)  --    Temp src  --  --  --  Oral  --    Resp  --  --  --  16  --    Resp Rate Source  --  --  --  Visual  --    BP  --  --  --  140/104  (Abnormal)   --    BP Location  --  --  --  Left arm  --    BP Method  --  --  --  Automatic  --    Patient Position  --  --  --  Lying  --       Oxygen Therapy    Device (Oxygen Therapy)  room air  room air  room air  --  room air    Row Name 01/30/19 0800 01/29/19 2026                Vitals    Temp  --  98.1 °F (36.7 °C)       Temp src  --  Oral       Resp  --  18       Resp Rate Source  --  Visual       BP  --  111/64       Noninvasive MAP (mmHg)  --  78       BP Location  --  Left arm       BP Method  --  Automatic       Patient Position  --  Lying          Oxygen Therapy    SpO2  --  95 %       Pulse Oximetry Type  --  Intermittent       Device (Oxygen Therapy)  room air  room air             Hospital Medications (all)       Dose Frequency Start End    benzonatate (TESSALON) capsule 100 mg 100 mg Once 1/28/2019 1/28/2019    Sig - Route: Take 1 capsule by  mouth 1 (One) Time. - Oral    diazePAM (VALIUM) tablet 20 mg 20 mg Once 1/28/2019 1/28/2019    Sig - Route: Take 4 tablets by mouth 1 (One) Time. - Oral    LORazepam (ATIVAN) injection 0.5 mg 0.5 mg Once 1/28/2019 1/28/2019    Sig - Route: Infuse 0.25 mL into a venous catheter 1 (One) Time. - Intravenous    LORazepam (ATIVAN) injection 1 mg 1 mg Once 1/28/2019 1/28/2019    Sig - Route: Infuse 0.5 mL into a venous catheter 1 (One) Time. - Intravenous    multiple vitamin (M.V.I. Adult) 10 mL, thiamine (B-1) 100 mg, folic acid 1 mg, magnesium sulfate 2 g in sodium chloride 0.9 % 1,000 mL infusion 1,000 mL/hr Once 1/28/2019 1/28/2019    Sig - Route: Infuse 1,000 mL/hr into a venous catheter 1 (One) Time. - Intravenous    acetaminophen (TYLENOL) tablet 650 mg (Discontinued) 650 mg Every 6 Hours PRN 1/30/2019 1/31/2019    Sig - Route: Take 2 tablets by mouth Every 6 (Six) Hours As Needed for Mild Pain . - Oral    Reason for Discontinue: Patient Discharge    diazePAM (VALIUM) tablet 10 mg (Discontinued) 10 mg Every 8 Hours 1/29/2019 1/31/2019    Sig - Route: Take 2 tablets by mouth Every 8 (Eight) Hours. - Oral    diazePAM (VALIUM) tablet 5 mg (Discontinued) 5 mg Every 8 Hours 1/31/2019 1/31/2019    Sig - Route: Take 1 tablet by mouth Every 8 (Eight) Hours. - Oral    Reason for Discontinue: Patient Discharge    enoxaparin (LOVENOX) syringe 40 mg (Discontinued) 40 mg Every 24 Hours 1/29/2019 1/31/2019    Sig - Route: Inject 0.4 mL under the skin into the appropriate area as directed Daily. - Subcutaneous    Reason for Discontinue: Patient Discharge    folic acid (FOLVITE) tablet 1 mg (Discontinued) 1 mg Daily 1/29/2019 1/31/2019    Sig - Route: Take 1 tablet by mouth Daily. - Oral    Reason for Discontinue: Patient Discharge    LORazepam (ATIVAN) injection 1 mg (Discontinued) 1 mg Every 2 Hours PRN 1/28/2019 1/31/2019    Sig - Route: Infuse 0.5 mL into a venous catheter Every 2 (Two) Hours As Needed for Withdrawal (For CIWA  "score 8-10). - Intravenous    Reason for Discontinue: Patient Discharge    Linked Group 1:  \"Or\" Linked Group Details        LORazepam (ATIVAN) injection 2 mg (Discontinued) 2 mg Every 1 Hour PRN 1/28/2019 1/31/2019    Sig - Route: Infuse 1 mL into a venous catheter Every 1 (One) Hour As Needed for Withdrawal (For CIWA score 11-15). - Intravenous    Reason for Discontinue: Patient Discharge    Linked Group 1:  \"Or\" Linked Group Details        LORazepam (ATIVAN) injection 2 mg (Discontinued) 2 mg Every 15 Minutes PRN 1/28/2019 1/31/2019    Sig - Route: Infuse 1 mL into a venous catheter Every 15 (Fifteen) Minutes As Needed for Anxiety (Use IV route first.  If unable to give IV, use IM.  For CIWA-Ar greater than 15.  Repeat dose in 15 minutes if CIWA-Ar is not decreasing.). - Intravenous    Reason for Discontinue: Patient Discharge    Linked Group 1:  \"Or\" Linked Group Details        LORazepam (ATIVAN) injection 2 mg (Discontinued) 2 mg Every 15 Minutes PRN 1/28/2019 1/31/2019    Sig - Route: Inject 1 mL into the appropriate muscle as directed by prescriber Every 15 (Fifteen) Minutes As Needed for Withdrawal (Use IV route first.  If unable to give IV, use IM.  For CIWA-Ar greater than 15.  Repeat dose in 15 minutes if CIWA-Ar is not decreasing.). - Intramuscular    Reason for Discontinue: Patient Discharge    Linked Group 1:  \"Or\" Linked Group Details        LORazepam (ATIVAN) tablet 1 mg (Discontinued) 1 mg Every 2 Hours PRN 1/28/2019 1/31/2019    Sig - Route: Take 1 tablet by mouth Every 2 (Two) Hours As Needed for Withdrawal (For CIWA score 8-10). - Oral    Reason for Discontinue: Patient Discharge    Linked Group 1:  \"Or\" Linked Group Details        LORazepam (ATIVAN) tablet 2 mg (Discontinued) 2 mg Every 1 Hour PRN 1/28/2019 1/31/2019    Sig - Route: Take 2 tablets by mouth Every 1 (One) Hour As Needed for Withdrawal (For CIWA score 11-15). - Oral    Reason for Discontinue: Patient Discharge    Linked Group 1:  " "\"Or\" Linked Group Details        multivitamin with minerals 1 tablet (Discontinued) 1 tablet Daily 1/29/2019 1/31/2019    Sig - Route: Take 1 tablet by mouth Daily. - Oral    Reason for Discontinue: Patient Discharge    nicotine (NICODERM CQ) 21 MG/24HR patch 1 patch (Discontinued) 1 patch Every 24 Hours Scheduled 1/28/2019 1/31/2019    Sig - Route: Place 1 patch on the skin as directed by provider Daily. - Transdermal    Reason for Discontinue: Patient Discharge    ondansetron (ZOFRAN) injection 4 mg (Discontinued) 4 mg Every 6 Hours PRN 1/29/2019 1/31/2019    Sig - Route: Infuse 2 mL into a venous catheter Every 6 (Six) Hours As Needed for Nausea or Vomiting. - Intravenous    Reason for Discontinue: Patient Discharge    sodium chloride 0.9 % flush 10 mL (Discontinued) 10 mL As Needed 1/28/2019 1/31/2019    Sig - Route: Infuse 10 mL into a venous catheter As Needed for Line Care. - Intravenous    Reason for Discontinue: Patient Discharge    Cosign for Ordering: Accepted by Bennett Izquierdo MD on 1/28/2019 11:11 PM    sodium chloride 0.9 % flush 3 mL (Discontinued) 3 mL Every 12 Hours Scheduled 1/28/2019 1/31/2019    Sig - Route: Infuse 3 mL into a venous catheter Every 12 (Twelve) Hours. - Intravenous    Reason for Discontinue: Patient Discharge    sodium chloride 0.9 % flush 3-10 mL (Discontinued) 3-10 mL As Needed 1/28/2019 1/31/2019    Sig - Route: Infuse 3-10 mL into a venous catheter As Needed for Line Care. - Intravenous    Reason for Discontinue: Patient Discharge    sodium chloride 0.9 % infusion (Discontinued) 100 mL/hr Continuous 1/28/2019 1/31/2019    Sig - Route: Infuse 100 mL/hr into a venous catheter Continuous. - Intravenous    Reason for Discontinue: Patient Discharge    thiamine (B-1) 500 mg in sodium chloride 0.9 % 100 mL IVPB (Discontinued) 500 mg Daily 1/29/2019 1/31/2019    Sig - Route: Infuse 500 mg into a venous catheter Daily. - Intravenous    Reason for Discontinue: Patient Discharge    " traMADol (ULTRAM) tablet 50 mg (Discontinued) 50 mg Every 6 Hours PRN 1/30/2019 1/31/2019    Sig - Route: Take 1 tablet by mouth Every 6 (Six) Hours As Needed for Moderate Pain . - Oral    Reason for Discontinue: Patient Discharge          Lab Results (all)     Procedure Component Value Units Date/Time    POC Glucose Once [[id number]]  (Normal) Collected:  01/31/19 0704    Specimen:  Blood Updated:  01/31/19 0706     Glucose 116 mg/dL     POC Glucose Once [[id number]]  (Normal) Collected:  01/30/19 2052    Specimen:  Blood Updated:  01/30/19 2102     Glucose 93 mg/dL     POC Glucose Once [[id number]]  (Abnormal) Collected:  01/30/19 1631    Specimen:  Blood Updated:  01/30/19 1633     Glucose 131 mg/dL     POC Glucose Once [[id number]]  (Normal) Collected:  01/30/19 1135    Specimen:  Blood Updated:  01/30/19 1137     Glucose 90 mg/dL     POC Glucose Once [[id number]]  (Normal) Collected:  01/30/19 0752    Specimen:  Blood Updated:  01/30/19 0754     Glucose 80 mg/dL     Protime-INR [[id number]]  (Normal) Collected:  01/30/19 0609    Specimen:  Blood Updated:  01/30/19 0722     Protime 12.7 Seconds      INR 1.00    Comprehensive Metabolic Panel [[id number]]  (Abnormal) Collected:  01/30/19 0609    Specimen:  Blood Updated:  01/30/19 0643     Glucose 88 mg/dL      BUN 8 mg/dL      Creatinine 0.61 mg/dL      Sodium 135 mmol/L      Potassium 3.6 mmol/L      Chloride 105 mmol/L      CO2 26.0 mmol/L      Calcium 8.3 mg/dL      Total Protein 5.8 g/dL      Albumin 3.44 g/dL      ALT (SGPT) 130 U/L      AST (SGOT) 133 U/L      Alkaline Phosphatase 143 U/L      Total Bilirubin 0.6 mg/dL      eGFR Non African Amer >150 mL/min/1.73      Globulin 2.4 gm/dL      A/G Ratio 1.5 g/dL      BUN/Creatinine Ratio 13.1     Anion Gap 4.0 mmol/L     Narrative:       National [employer] Guidelines    Stage     Description        GFR  1         Normal or High     90+  2         Mild decrease      60-89  3         Moderate decrease  30-59  4          Severe decrease    15-29  5         Kidney failure     <15    The MDRD GFR formula is only valid for adults with stable renal function between ages 18 and 70.    CBC (No Diff) [[id number]]  (Abnormal) Collected:  01/30/19 0609    Specimen:  Blood Updated:  01/30/19 0633     WBC 6.18 10*3/mm3      RBC 4.47 10*6/mm3      Hemoglobin 14.3 g/dL      Hematocrit 43.1 %      MCV 96.4 fL      MCH 32.0 pg      MCHC 33.2 g/dL      RDW 14.4 %      RDW-SD 51.4 fl      MPV 13.0 fL      Platelets 58 10*3/mm3     POC Glucose Once [[id number]]  (Abnormal) Collected:  01/29/19 1641    Specimen:  Blood Updated:  01/29/19 1642     Glucose 69 mg/dL     POC Glucose Once [[id number]]  (Normal) Collected:  01/29/19 1204    Specimen:  Blood Updated:  01/29/19 1207     Glucose 73 mg/dL     Protime-INR [[id number]]  (Normal) Collected:  01/29/19 0825    Specimen:  Blood Updated:  01/29/19 0926     Protime 12.7 Seconds      INR 1.00    Comprehensive Metabolic Panel [[id number]]  (Abnormal) Collected:  01/29/19 0825    Specimen:  Blood Updated:  01/29/19 0911     Glucose 76 mg/dL      BUN 8 mg/dL      Creatinine 0.58 mg/dL      Sodium 134 mmol/L      Potassium 3.5 mmol/L      Chloride 105 mmol/L      CO2 23.0 mmol/L      Calcium 7.8 mg/dL      Total Protein 5.9 g/dL      Albumin 3.48 g/dL      ALT (SGPT) 127 U/L      AST (SGOT) 140 U/L      Alkaline Phosphatase 135 U/L      Total Bilirubin 1.1 mg/dL      eGFR Non African Amer >150 mL/min/1.73      Globulin 2.4 gm/dL      A/G Ratio 1.4 g/dL      BUN/Creatinine Ratio 13.8     Anion Gap 6.0 mmol/L     Narrative:       National Kid[employer] Guidelines    Stage     Description        GFR  1         Normal or High     90+  2         Mild decrease      60-89  3         Moderate decrease  30-59  4         Severe decrease    15-29  5         Kidney failure     <15    The MDRD GFR formula is only valid for adults with stable renal function between ages 18 and 70.    POC Glucose Once [[id number]]   (Normal) Collected:  01/29/19 0738    Specimen:  Blood Updated:  01/29/19 0740     Glucose 80 mg/dL     Urine Drug Screen - Urine, Clean Catch [[id number]]  (Abnormal) Collected:  01/28/19 2321    Specimen:  Urine, Clean Catch Updated:  01/29/19 0020     THC, Screen, Urine Negative     Phencyclidine (PCP), Urine Negative     Cocaine Screen, Urine Negative     Methamphetamine, Urine Negative     Opiate Screen Negative     Amphetamine Screen, Urine Negative     Benzodiazepine Screen, Urine Positive     Tricyclic Antidepressants Screen Negative     Methadone Screen, Urine Negative     Barbiturates Screen, Urine Negative     Oxycodone Screen, Urine Negative     Propoxyphene Screen Negative     Buprenorphine, Screen, Urine Negative    Narrative:       Cutoff For Drugs Screened:    Amphetamines               500 ng/ml  Barbiturates               200 ng/ml  Benzodiazepines            150 ng/ml  Cocaine                    150 ng/ml  Methadone                  200 ng/ml  Opiates                    100 ng/ml  Phencyclidine               25 ng/ml  THC                            50 ng/ml  Methamphetamine            500 ng/ml  Tricyclic Antidepressants  300 ng/ml  Oxycodone                  100 ng/ml  Propoxyphene               300 ng/ml  Buprenorphine               10 ng/ml    The normal value for all drugs tested is negative. This report includes unconfirmed screening results, with the cutoff values listed, to be used for medical treatment purposes only.  Unconfirmed results must not be used for non-medical purposes such as employment or legal testing.  Clinical consideration should be applied to any drug of abuse test, particularly when unconfirmed results are used.      TSH [[id number]]  (Normal) Collected:  01/28/19 1420    Specimen:  Blood Updated:  01/28/19 2216     TSH 2.426 mIU/mL     Protime-INR [[id number]]  (Normal) Collected:  01/28/19 2113    Specimen:  Blood Updated:  01/28/19 2137     Protime 12.9 Seconds      INR  1.02    aPTT [[id number]]  (Normal) Collected:  01/28/19 1420    Specimen:  Blood Updated:  01/28/19 1648     PTT 32.3 seconds     Narrative:       PTT = The equivalent PTT values for the therapeutic range of heparin levels at 0.3 to 0.5 U/ml are 55 to 70 seconds.    Protime-INR [[id number]]  (Normal) Collected:  01/28/19 1420    Specimen:  Blood Updated:  01/28/19 1648     Protime 12.2 Seconds      INR 0.95    [place] Draw [[id number]] Collected:  01/28/19 1420    Specimen:  Blood Updated:  01/28/19 1530    Narrative:       The following orders were created for panel order [place] Draw.  Procedure                               Abnormality         Status                     ---------                               -----------         ------                     Light Blue Top[[id number]]                                   Final result               Green Top (Gel)[[id number]]                                  Final result               Lavender Top[[id number]]                                     Final result               Gold Top - SST[[id number]]                                   Final result               Green Top (No Gel)[[id number]]                                                            Please view results for these tests on the individual orders.    Green Top (Gel) [[id number]] Collected:  01/28/19 1420    Specimen:  Blood Updated:  01/28/19 1530     Extra Tube Hold for add-ons.     Comment: Auto resulted.       Light Blue Top [[id number]] Collected:  01/28/19 1420    Specimen:  Blood Updated:  01/28/19 1530     Extra Tube hold for add-on     Comment: Auto resulted       Lavender Top [[id number]] Collected:  01/28/19 1420    Specimen:  Blood Updated:  01/28/19 1530     Extra Tube hold for add-on     Comment: Auto resulted       Gold Top - SST [[id number]] Collected:  01/28/19 1420    Specimen:  Blood Updated:  01/28/19 1530     Extra Tube Hold for add-ons.     Comment: Auto resulted.       CBC & Differential [[id number]]  Collected:  01/28/19 1420    Specimen:  Blood Updated:  01/28/19 1523    Narrative:       The following orders were created for panel order CBC & Differential.  Procedure                               Abnormality         Status                     ---------                               -----------         ------                     Scan Slide[[id number]]                                       Final result               CBC Auto Differential[[id number]]        Abnormal            Final result                 Please view results for these tests on the individual orders.    CBC Auto Differential [[id number]]  (Abnormal) Collected:  01/28/19 1420    Specimen:  Blood Updated:  01/28/19 1523     WBC 10.40 10*3/mm3      RBC 5.24 10*6/mm3      Hemoglobin 17.1 g/dL      Hematocrit 48.8 %      MCV 93.1 fL      MCH 32.6 pg      MCHC 35.0 g/dL      RDW 14.2 %      RDW-SD 48.4 fl      Platelets 50 10*3/mm3      Neutrophil % 78.3 %      Lymphocyte % 10.0 %      Monocyte % 10.3 %      Eosinophil % 0.5 %      Basophil % 0.9 %      Immature Grans % 0.3 %      Neutrophils, Absolute 8.15 10*3/mm3      Lymphocytes, Absolute 1.04 10*3/mm3      Monocytes, Absolute 1.07 10*3/mm3      Eosinophils, Absolute 0.05 10*3/mm3      Basophils, Absolute 0.09 10*3/mm3      Immature Grans, Absolute 0.03 10*3/mm3      nRBC 0.0 /100 WBC     Scan Slide [[id number]] Collected:  01/28/19 1420    Specimen:  Blood Updated:  01/28/19 1523     RBC Morphology Normal     WBC Morphology Normal     Large Platelets Slight/1+    Lipase [[id number]]  (Abnormal) Collected:  01/28/19 1420    Specimen:  Blood Updated:  01/28/19 1518     Lipase 97 U/L     Troponin [[id number]]  (Normal) Collected:  01/28/19 1420    Specimen:  Blood Updated:  01/28/19 1518     Troponin I <0.006 ng/mL      Comment: Results may be falsely decreased if patient taking Biotin.       Comprehensive Metabolic Panel [[id number]]  (Abnormal) Collected:  01/28/19 1420    Specimen:  Blood Updated:   01/28/19 1513     Glucose 137 mg/dL      BUN 6 mg/dL      Creatinine 0.76 mg/dL      Sodium 132 mmol/L      Potassium 4.4 mmol/L      Chloride 98 mmol/L      CO2 21.0 mmol/L      Calcium 8.8 mg/dL      Total Protein 7.0 g/dL      Albumin 4.49 g/dL      ALT (SGPT) 176 U/L      AST (SGOT) 212 U/L      Alkaline Phosphatase 173 U/L      Total Bilirubin 1.1 mg/dL      eGFR Non African Amer 119 mL/min/1.73      Globulin 2.5 gm/dL      A/G Ratio 1.8 g/dL      BUN/Creatinine Ratio 7.9     Anion Gap 13.0 mmol/L     Narrative:       National Kid[employer] Guidelines    Stage     Description        GFR  1         Normal or High     90+  2         Mild decrease      60-89  3         Moderate decrease  30-59  4         Severe decrease    15-29  5         Kidney failure     <15    The MDRD GFR formula is only valid for adults with stable renal function between ages 18 and 70.    Magnesium [[id number]]  (Normal) Collected:  01/28/19 1420    Specimen:  Blood Updated:  01/28/19 1513     Magnesium 2.1 mg/dL           Imaging Results (all)     Procedure Component Value Units Date/Time    CT Head Without Contrast [[id number]] Collected:  01/28/19 1632     Updated:  01/30/19 0957    Narrative:       EXAMINATION: CT HEAD WO CONTRAST-01/28/2019:      INDICATION: Headache, seizure.     TECHNIQUE: Multiple axial CT imaging was obtained of the head from the  skull base to the skull vertex without the administration of intravenous  contrast.     The radiation dose reduction device was turned on for each scan per the  ALARA (As Low as Reasonably Achievable) protocol.     COMPARISON: NONE.     FINDINGS: The parenchyma is unremarkable in appearance. No hemorrhage or  hydrocephalus. No mass, mass effect, or midline shift. No abnormal  extra-axial fluid collection is identified. The bony structures reveal  no evidence of osseous abnormality. The visualized paranasal sinuses are  clear. The mastoid air cells are patent.       Impression:       No  acute intracranial abnormality identified.     D:  01/28/2019  E:  01/28/2019           This report was finalized on 1/30/2019 9:54 AM by Dr. Margaret Scruggs MD.       XR Chest PA & Lateral [[id number]] Collected:  01/29/19 1636     Updated:  01/29/19 1746    Narrative:       EXAMINATION: XR CHEST PA AND LATERAL- 01/29/2019     INDICATION: Right rib pain; F10.239-Alcohol dependence with withdrawal,  unspecified; R56.9-Unspecified convulsions      COMPARISON: NONE     FINDINGS: Cardiac silhouette within normal limits. Mild prominence of  the perihilar lung markings may suggest peribronchial inflammatory  process without focal consolidation otherwise. No pneumothorax or  significant effusion. No acute osseous abnormality specifically no  displaced right rib fracture or cortical irregularity to suggest osseous  abnormality of the right ribs.           Impression:       Mild prominence of the perihilar lung markings along with  peribronchial cuffing suggesting peribronchial inflammatory process such  as bronchitis without focal consolidation of pneumonia. No significant  pleural effusion or displaced rib fracture on the right is identified.     D:  01/29/2019  E:  01/29/2019     This report was finalized on 1/29/2019 5:44 PM by Dr. Emanuel Camara.             ECG/EMG Results (all)     Procedure Component Value Units Date/Time    ECG 12 Lead [[id number]] Collected:  01/28/19 1534     Updated:  01/28/19 2256    Narrative:       Test Reason : siezure  Blood Pressure : **/** mmHG  Vent. Rate : 101 BPM     Atrial Rate : 101 BPM     P-R Int : 148 ms          QRS Dur : 076 ms      QT Int : 360 ms       P-R-T Axes : 052 -59 039 degrees     QTc Int : 466 ms    Sinus tachycardia  Left axis deviation  Inferior infarct , age undetermined  Abnormal ECG  No previous ECGs available  Confirmed by DEVAN IVORY MD ([id number]) on 1/28/2019 10:56:26 PM    Referred By:  EDMD           Confirmed By:DEVAN IVORY MD          Orders (all)      Start     Ordered    01/31/19 1000  diazePAM (VALIUM) tablet 5 mg  Every 8 Hours,   Status:  Discontinued      01/31/19 0700    01/31/19 0849  Discharge patient  Once      01/31/19 0849    01/31/19 0849  Discontinue IV  Once,   Status:  Canceled      01/31/19 0849    01/31/19 0706  POC Glucose Once  Once      01/31/19 0704    01/31/19 0000  folic acid (FOLVITE) 1 MG tablet  Daily      01/31/19 0849    01/31/19 0000  Multiple Vitamins-Minerals (MULTIVITAMIN WITH MINERALS) tablet tablet  Daily      01/31/19 0849    01/31/19 0000  diazePAM (VALIUM) 5 MG tablet  2 Times Daily      01/31/19 0849    01/31/19 0000  Discharge Follow-up with PCP      01/31/19 0849    01/31/19 0000  Activity as Tolerated      01/31/19 0849    01/31/19 0000  Diet: Regular; Thin      01/31/19 0849    01/30/19 2103  POC Glucose Once  Once      01/30/19 2052    01/30/19 1634  POC Glucose Once  Once      01/30/19 1631    01/30/19 1411  traMADol (ULTRAM) tablet 50 mg  Every 6 Hours PRN,   Status:  Discontinued      01/30/19 1411    01/30/19 1410  acetaminophen (TYLENOL) tablet 650 mg  Every 6 Hours PRN,   Status:  Discontinued      01/30/19 1411    01/30/19 1138  POC Glucose Once  Once      01/30/19 1135    01/30/19 0755  POC Glucose Once  Once      01/30/19 0752    01/30/19 0600  Comprehensive Metabolic Panel  Morning Draw      01/29/19 0805    01/30/19 0600  CBC (No Diff)  Morning Draw      01/29/19 0805    01/30/19 0600  Protime-INR  Morning Draw      01/29/19 0805    01/29/19 2247  ondansetron (ZOFRAN) injection 4 mg  Every 6 Hours PRN,   Status:  Discontinued      01/29/19 2248    01/29/19 1800  Dietary Nutrition Supplements Boost Plus  Daily With Dinner,   Status:  Canceled      01/29/19 1407    01/29/19 1643  POC Glucose Once  Once      01/29/19 1641    01/29/19 1208  POC Glucose Once  Once      01/29/19 1204    01/29/19 1000  diazePAM (VALIUM) tablet 10 mg  Every 8 Hours,   Status:  Discontinued      01/29/19 0755    01/29/19 0900   multivitamin with minerals 1 tablet  Daily,   Status:  Discontinued      01/28/19 2151 01/29/19 0900  folic acid (FOLVITE) tablet 1 mg  Daily,   Status:  Discontinued      01/28/19 2151 01/29/19 0900  enoxaparin (LOVENOX) syringe 40 mg  Every 24 Hours,   Status:  Discontinued      01/29/19 0806    01/29/19 0900  thiamine (B-1) 500 mg in sodium chloride 0.9 % 100 mL IVPB  Daily,   Status:  Discontinued      01/29/19 0810    01/29/19 0806  Protime-INR  Once      01/29/19 0805    01/29/19 0805  Comprehensive Metabolic Panel  Once      01/29/19 0805    01/29/19 0804  EEG  Once     Comments:  portable    01/29/19 0803    01/29/19 0741  POC Glucose Once  Once      01/29/19 0738    01/29/19 0700  POC Glucose Finger TID AC  3 Times Daily Before Meals,   Status:  Canceled      01/28/19 2151 01/29/19 0600  Basic Metabolic Panel  Morning Draw,   Status:  Canceled      01/28/19 2151 01/29/19 0500  ECG 12 Lead  Tomorrow AM,   Status:  Canceled      01/28/19 2151 01/29/19 0322  Influenza A & B, RT PCR - Swab, Nasopharynx  Once      01/28/19 2151 01/29/19 0000  Vital Signs  Every 4 Hours,   Status:  Canceled      01/28/19 2151 01/28/19 2330  diazePAM (VALIUM) tablet 20 mg  Once      01/28/19 2233    01/28/19 2245  sodium chloride 0.9 % flush 3 mL  Every 12 Hours Scheduled,   Status:  Discontinued      01/28/19 2151 01/28/19 2245  sodium chloride 0.9 % infusion  Continuous,   Status:  Discontinued      01/28/19 2151 01/28/19 2152  Safety Precautions  Continuous,   Status:  Canceled      01/28/19 2151 01/28/19 2152  Vital Signs  Per Hospital Policy,   Status:  Canceled      01/28/19 2151 01/28/19 2152  Continuous Pulse Oximetry  Continuous,   Status:  Canceled      01/28/19 2151 01/28/19 2152  Obtain Baseline Clinical [place] Withdrawal Assessment - Ar, Sedation Scale & Vital Signs  Once     Comments:  Follow [facility] Scoring Reference Guide    01/28/19 2151    01/28/19 2152  Clinical [place]  Withdrawal Assessment (CIWA-Ar)  Per Hospital Policy,   Status:  Canceled      01/28/19 2151 01/28/19 2152  If CIWA Score < 8 Monitor q4 hours x 24 hours And Then Discontinue Assessment- Restart Scoring Assessment & Protocol if Symptoms Reappear  Continuous,   Status:  Canceled      01/28/19 2151 01/28/19 2152  Obtain Pre & Post Sedation Scores With Every Lorazepam Dose - Hold For POSS Greater Than 2 or RASS of -3 or Less  Continuous,   Status:  Canceled      01/28/19 2151 01/28/19 2152  Seizure Precautions  Continuous,   Status:  Canceled      01/28/19 2151 01/28/19 2152  Notify Provider - Withdrawal  Until Discontinued,   Status:  Canceled     Comments:  Including: anxiety, agitation, severe vomiting, seizure activity.    01/28/19 2151 01/28/19 2152  Notify Provider of Abnormal Lab Results  Until Discontinued,   Status:  Canceled      01/28/19 2151 01/28/19 2152  Notify Provider - Vitals  Until Discontinued,   Status:  Canceled      01/28/19 2151 01/28/19 2152  Inpatient Case Management  Consult  Once     Provider:  (Not yet assigned)    01/28/19 2151 01/28/19 2152  TSH  Once      01/28/19 2151 01/28/19 2152  Intake & Output  Every Shift,   Status:  Canceled      01/28/19 2151 01/28/19 2152  Weigh Patient  Once,   Status:  Canceled      01/28/19 2151 01/28/19 2152  Oxygen Therapy- Nasal Cannula; Titrate for SPO2: 90%  Continuous,   Status:  Canceled      01/28/19 2151 01/28/19 2152  Insert Peripheral IV  Once,   Status:  Canceled      01/28/19 2151 01/28/19 2152  Saline Lock & Maintain IV Access  Continuous,   Status:  Canceled      01/28/19 2151 01/28/19 2152  Place Venous Foot Pump  Once      01/28/19 2151 01/28/19 2152  Maintain Venous Foot Pump  Continuous,   Status:  Canceled      01/28/19 2151 01/28/19 2152  Place Sequential Compression Device  Once      01/28/19 2151 01/28/19 2152  Maintain Sequential Compression Device  Continuous,   Status:   Canceled      01/28/19 2151 01/28/19 2152  Cardiac Monitoring  Continuous,   Status:  Canceled      01/28/19 2151 01/28/19 2152  Pulse Oximetry, Continuous  Continuous,   Status:  Canceled      01/28/19 2151 01/28/19 2152  Diet Regular  Diet Effective Now,   Status:  Canceled      01/28/19 2151 01/28/19 2152  XR Chest PA & Lateral  1 Time Imaging      01/28/19 2151 01/28/19 2151  sodium chloride 0.9 % flush 3-10 mL  As Needed,   Status:  Discontinued      01/28/19 2151 01/28/19 2151  Up With Assistance  As Needed,   Status:  Canceled      01/28/19 2151 01/28/19 2048  nicotine (NICODERM CQ) 21 MG/24HR patch 1 patch  Every 24 Hours Scheduled,   Status:  Discontinued      01/28/19 2046 01/28/19 2046  LORazepam (ATIVAN) tablet 1 mg  Every 2 Hours PRN,   Status:  Discontinued      01/28/19 2046 01/28/19 2046  LORazepam (ATIVAN) injection 1 mg  Every 2 Hours PRN,   Status:  Discontinued      01/28/19 2046 01/28/19 2046  LORazepam (ATIVAN) tablet 2 mg  Every 1 Hour PRN,   Status:  Discontinued      01/28/19 2046 01/28/19 2046  LORazepam (ATIVAN) injection 2 mg  Every 1 Hour PRN,   Status:  Discontinued      01/28/19 2046 01/28/19 2046  LORazepam (ATIVAN) injection 2 mg  Every 15 Minutes PRN,   Status:  Discontinued      01/28/19 2046 01/28/19 2046  LORazepam (ATIVAN) injection 2 mg  Every 15 Minutes PRN,   Status:  Discontinued      01/28/19 2046 01/28/19 2042  Protime-INR  STAT      01/28/19 2041 01/28/19 2032  Code Status and Medical Interventions:  Continuous,   Status:  Canceled      01/28/19 2032 01/28/19 1934  Urine Drug Screen - Urine, Clean Catch  STAT      01/28/19 1933    01/28/19 1851  Tele Bed Request  Once      01/28/19 1851    01/28/19 1844  Inpatient Admission  Once      01/28/19 1844    01/28/19 1827  LORazepam (ATIVAN) injection 0.5 mg  Once      01/28/19 1825 01/28/19 1714  benzonatate (TESSALON) capsule 100 mg  Once      01/28/19 1713    01/28/19 1638   aPTT  STAT      01/28/19 1635    01/28/19 1636  Protime-INR  STAT      01/28/19 1635    01/28/19 1453  Scan Slide  Once      01/28/19 1452    01/28/19 1451  CBC Auto Differential  Once      01/28/19 1450    01/28/19 1446  Troponin  Once      01/28/19 1446    01/28/19 1444  multiple vitamin (M.V.I. Adult) 10 mL, thiamine (B-1) 100 mg, folic acid 1 mg, magnesium sulfate 2 g in sodium chloride 0.9 % 1,000 mL infusion  Once      01/28/19 1442    01/28/19 1444  LORazepam (ATIVAN) injection 1 mg  Once      01/28/19 1442    01/28/19 1443  CT Head Without Contrast  1 Time Imaging      01/28/19 1442    01/28/19 1442  POCT Troponin, Rapid  STAT,   Status:  Canceled      01/28/19 1442    01/28/19 1442  ECG 12 Lead  STAT      01/28/19 1442    01/28/19 1441  CBC & Differential  Once      01/28/19 1442    01/28/19 1441  Comprehensive Metabolic Panel  Once      01/28/19 1442    01/28/19 1441  Lipase  Once      01/28/19 1442    01/28/19 1441  Magnesium  STAT      01/28/19 1442    01/28/19 1421  Insert peripheral IV  Once,   Status:  Canceled      01/28/19 1420    01/28/19 1421  [place] Draw  Once      01/28/19 1420    01/28/19 1421  Light Blue Top  PROCEDURE ONCE      01/28/19 1420    01/28/19 1421  Green Top (Gel)  PROCEDURE ONCE      01/28/19 1420    01/28/19 1421  Lavender Top  PROCEDURE ONCE      01/28/19 1420    01/28/19 1421  Gold Top - SST  PROCEDURE ONCE      01/28/19 1420    01/28/19 1421  Green Top (No Gel)  PROCEDURE ONCE,   Status:  Canceled      01/28/19 1420    01/28/19 1420  sodium chloride 0.9 % flush 10 mL  As Needed,   Status:  Discontinued      01/28/19 1420    --  meloxicam (MOBIC) 15 MG tablet  Daily,   Status:  Discontinued      01/28/19 1933    --  albuterol sulfate  (90 Base) MCG/ACT inhaler  Every 4 Hours PRN      01/28/19 1933    --  SCANNED - TELEMETRY        01/28/19 0000          Operative/Procedure Notes (all)     No notes of this type exist for this encounter.           Physician Progress Notes  (all)      Ana Manning APRN at 2019 10:05 AM              [facility] Medicine Services  PROGRESS NOTE    Patient Name: Dung Knight  : 1985  MRN: [id number]    Date of Admission: 2019  Length of Stay: 2  Primary Care Physician: Yinka Santiago DO    Subjective   Subjective     CC:  Hospital follow up for Seizure and alcohol withdrawal    HPI:  Patient sleeping comfortably in bed with SO awake at bedside.  I did not wake him.  Per the SO, he slept well last night and has been much less anxious.  Tremors and delirium improved on scheduled valium.  The SO does not believe that he will completely stop drinking once discharged, maybe just cut back.     Review of Systems  Unable to assess- patient sleeping comfortably    Objective   Objective     Vital Signs:   Temp:  [98.1 °F (36.7 °C)-98.4 °F (36.9 °C)] 98.4 °F (36.9 °C)  Resp:  [16-18] 16  BP: (111-140)/() 140/104        Physical Exam:  Constitutional:Asleep, comfortable in bed, SO at bedside.   HENT: NCAT, mucous membranes moist, neck supple  Respiratory: respiratory effort normal, nonlabored breathing on room air   Cardiovascular: RRR, no murmurs, rubs, or gallops, no lower extremity edema  Gastrointestinal: Positive bowel sounds, soft, nontender, nondistended  Musculoskeletal: Normal musculature for age  Neurologic: sleeping but arouses to voice  Skin: No rashes or jaundice, warm      Results Reviewed:  I have personally reviewed current lab, radiology, and data and agree.    Results from last 7 days   Lab Units 19  0609 19  0825 193 19  1420   WBC 10*3/mm3 6.18  --   --  10.40   HEMOGLOBIN g/dL 14.3  --   --  17.1   HEMATOCRIT % 43.1  --   --  48.8   PLATELETS 10*3/mm3 58*  --   --  50*   INR  1.00 1.00 1.02 0.95     Results from last 7 days   Lab Units 19  0609 19  0825 19  1420   SODIUM mmol/L 135 134 132   POTASSIUM mmol/L 3.6 3.5 4.4   CHLORIDE mmol/L 105 105  98*   CO2 mmol/L 26.0 23.0 21.0   BUN mg/dL 8* 8* 6*   CREATININE mg/dL 0.61 0.58* 0.76   GLUCOSE mg/dL 88 76 137*   CALCIUM mg/dL 8.3* 7.8* 8.8   ALT (SGPT) U/L 130* 127* 176*   AST (SGOT) U/L 133* 140* 212*   TROPONIN I ng/mL  --   --  <0.006     Estimated Creatinine Clearance: 171.9 mL/min (by C-G formula based on SCr of 0.61 mg/dL).    No results found for: BNP    Microbiology Results Abnormal     None          Imaging Results (last 24 hours)     Procedure Component Value Units Date/Time    CT Head Without Contrast [[id number]] Collected:  01/28/19 1632     Updated:  01/30/19 0957    Narrative:       EXAMINATION: CT HEAD WO CONTRAST-01/28/2019:      INDICATION: Headache, seizure.     TECHNIQUE: Multiple axial CT imaging was obtained of the head from the  skull base to the skull vertex without the administration of intravenous  contrast.     The radiation dose reduction device was turned on for each scan per the  ALARA (As Low as Reasonably Achievable) protocol.     COMPARISON: NONE.     FINDINGS: The parenchyma is unremarkable in appearance. No hemorrhage or  hydrocephalus. No mass, mass effect, or midline shift. No abnormal  extra-axial fluid collection is identified. The bony structures reveal  no evidence of osseous abnormality. The visualized paranasal sinuses are  clear. The mastoid air cells are patent.       Impression:       No acute intracranial abnormality identified.     D:  01/28/2019  E:  01/28/2019           This report was finalized on 1/30/2019 9:54 AM by Dr. Margaret Scruggs MD.       XR Chest PA & Lateral [[id number]] Collected:  01/29/19 1636     Updated:  01/29/19 1746    Narrative:       EXAMINATION: XR CHEST PA AND LATERAL- 01/29/2019     INDICATION: Right rib pain; F10.239-Alcohol dependence with withdrawal,  unspecified; R56.9-Unspecified convulsions      COMPARISON: NONE     FINDINGS: Cardiac silhouette within normal limits. Mild prominence of  the perihilar lung markings may suggest  peribronchial inflammatory  process without focal consolidation otherwise. No pneumothorax or  significant effusion. No acute osseous abnormality specifically no  displaced right rib fracture or cortical irregularity to suggest osseous  abnormality of the right ribs.           Impression:       Mild prominence of the perihilar lung markings along with  peribronchial cuffing suggesting peribronchial inflammatory process such  as bronchitis without focal consolidation of pneumonia. No significant  pleural effusion or displaced rib fracture on the right is identified.     D:  01/29/2019  E:  01/29/2019     This report was finalized on 1/29/2019 5:44 PM by Dr. Emanuel Camara.                  I have reviewed the medications:    Current Facility-Administered Medications:   •  diazePAM (VALIUM) tablet 10 mg, 10 mg, Oral, Q8H, Joni Prabhakar MD, 10 mg at 01/30/19 0911  •  enoxaparin (LOVENOX) syringe 40 mg, 40 mg, Subcutaneous, Q24H, Joni Prabhakar MD, 40 mg at 01/30/19 0911  •  folic acid (FOLVITE) tablet 1 mg, 1 mg, Oral, Daily, Anastasiia Vickers, APRN, 1 mg at 01/30/19 0911  •  LORazepam (ATIVAN) tablet 1 mg, 1 mg, Oral, Q2H PRN **OR** LORazepam (ATIVAN) injection 1 mg, 1 mg, Intravenous, Q2H PRN, 1 mg at 01/28/19 2055 **OR** LORazepam (ATIVAN) tablet 2 mg, 2 mg, Oral, Q1H PRN, 2 mg at 01/29/19 2107 **OR** LORazepam (ATIVAN) injection 2 mg, 2 mg, Intravenous, Q1H PRN, 2 mg at 01/29/19 1827 **OR** LORazepam (ATIVAN) injection 2 mg, 2 mg, Intravenous, Q15 Min PRN, 2 mg at 01/29/19 1517 **OR** LORazepam (ATIVAN) injection 2 mg, 2 mg, Intramuscular, Q15 Min PRN, Anastasiia Vickers H, APRN  •  multivitamin with minerals 1 tablet, 1 tablet, Oral, Daily, Anastasiia Vickers, APRN, 1 tablet at 01/30/19 0911  •  nicotine (NICODERM CQ) 21 MG/24HR patch 1 patch, 1 patch, Transdermal, Q24H, Anastasiia Vickers, APRN, 1 patch at 01/30/19 0911  •  ondansetron (ZOFRAN) injection 4 mg, 4 mg, Intravenous, Q6H PRN, Nasim  Priti R, APRN, 4 mg at 01/29/19 2347  •  sodium chloride 0.9 % flush 10 mL, 10 mL, Intravenous, PRN, Bennett Izquierdo MD  •  sodium chloride 0.9 % flush 3 mL, 3 mL, Intravenous, Q12H, Scott Vickersssica H, APRN, 3 mL at 01/29/19 2107  •  sodium chloride 0.9 % flush 3-10 mL, 3-10 mL, Intravenous, PRN, Rancho, Anastasiia H, APRN  •  sodium chloride 0.9 % infusion, 100 mL/hr, Intravenous, Continuous, Rancho, Anastasiia H, APRN, Stopped at 01/30/19 0400  •  thiamine (B-1) 500 mg in sodium chloride 0.9 % 100 mL IVPB, 500 mg, Intravenous, Daily, Joni Prabhakar MD, Last Rate: 200 mL/hr at 01/30/19 0911, 500 mg at 01/30/19 0911      Assessment/Plan   Assessment / Plan     Active Hospital Problems    Diagnosis Date Noted   • **Alcohol withdrawal with delirium (CMS/HCC) [F10.231] 01/29/2019   • Alcohol withdrawal seizure (CMS/HCC) [F10.239, R56.9] 01/28/2019   • Thrombocytopenia (CMS/HCC) [D69.6] 01/28/2019   • Elevated liver enzymes [R74.8] 01/28/2019   • Polysubstance abuse (CMS/HCC) [F19.10] 01/28/2019   • Alcoholic hepatitis [K70.10] 01/28/2019          Brief Hospital Course to date:  Dung Knigth is a 33 y.o. male with past medical history of alcohol abuse and dependence who reportedly drinks 18 total 16 ounce [employer] best beers daily, presents to the hospital following a seizure that occurred at home that was witnessed by his wife.  Patient abruptly discontinue drinking due to intractable nausea and vomiting and diarrhea that occurred approximately 2 days prior to admission.    Alcohol withdrawal with delirium  Alcohol withdrawal seizure  Probable acute viral gastroenteritis  Alcoholic hepatitis, mild  Alcohol abuse and dependence, cessation counseled  Thrombocytopenia likely due to alcoholic hepatitis and alcohol abuse  Tobacco use, cessation counseling  Chronic tremors  History migraines    Plan:  Plan for supportive care and symptomatic treatment.   Continue IV fluids and thiamine  CIWA protocol- PRN ativan.   He utilized a lot of ativan yesterday but it has tapered off since initiation of scheduled valium.  Will likely need a valium taper at discharge.   Encourage nutrition once more awake and eating for alcoholic hepatitis.   Plan to trend INR and LFTs.    Social work consult for counseling patient regarding alcohol withdrawal.   Repeat lab work in am.     DVT Prophylaxis:  Lovenox    Disposition: I expect the patient to be discharged in the next 1-2 days.     CODE STATUS:   Code Status and Medical Interventions:   Ordered at: 19     Code Status:    CPR     Medical Interventions (Level of Support Prior to Arrest):    Full         Electronically signed by BLAINE Alvarenga, 19, 1:47 PM.        Electronically signed by Ana Manning APRN at 2019  2:01 PM     Joni Prabhakar MD at 2019  8:06 AM              [facility] Medicine Services  PROGRESS NOTE    Patient Name: Dung Knight  : 1985  MRN: [id number]    Date of Admission: 2019  Length of Stay: 1  Primary Care Physician: Yinka Santiago DO    Subjective   Subjective     CC:  Seizure and alcohol withdrawal    HPI:  Patient is currently delirious.  He is writhing in bed and agitated.  He stooled on his self and started cursing.  He does not answer questions or follow commands well due to current alcohol withdrawal.  His wife is at the bedside providing support and care.  Nursing reports that his symptoms improved with Ativan and Valium.    Review of Systems  Unable to assess due to apical withdrawal    Objective   Objective     Vital Signs:   Temp:  [98.3 °F (36.8 °C)-98.5 °F (36.9 °C)] 98.3 °F (36.8 °C)  Heart Rate:  [] 100  Resp:  [18-24] 20  BP: (109-149)/() 132/109        Physical Exam:  Constitutional:Awake, confused, delirious  HENT: NCAT, mucous membranes moist, neck supple  Respiratory: Clear to auscultation bilaterally, respiratory effort normal, nonlabored breathing    Cardiovascular: RRR, no murmurs, rubs, or gallops, no lower extremity edema  Gastrointestinal: Positive bowel sounds, soft, nontender, nondistended  Musculoskeletal: Normal musculature for age, BMI 24  Psychiatric: Patient is agitated and writhing in bed.  He does speak occasional words.  Appropriate affect, cooperative, conversational  Neurologic: No slurred speech or facial droop, follows commands, not confused   Skin: No rashes or jaundice, warm      Results Reviewed:  I have personally reviewed current lab, radiology, and data and agree.    Results from last 7 days   Lab Units 01/28/19  2113 01/28/19  1420   WBC 10*3/mm3  --  10.40   HEMOGLOBIN g/dL  --  17.1   HEMATOCRIT %  --  48.8   PLATELETS 10*3/mm3  --  50*   INR  1.02 0.95     Results from last 7 days   Lab Units 01/28/19  1420   SODIUM mmol/L 132   POTASSIUM mmol/L 4.4   CHLORIDE mmol/L 98*   CO2 mmol/L 21.0   BUN mg/dL 6*   CREATININE mg/dL 0.76   GLUCOSE mg/dL 137*   CALCIUM mg/dL 8.8   ALT (SGPT) U/L 176*   AST (SGOT) U/L 212*   TROPONIN I ng/mL <0.006     Estimated Creatinine Clearance: 138 mL/min (by C-G formula based on SCr of 0.76 mg/dL).    No results found for: BNP    Microbiology Results Abnormal     None          Imaging Results (last 24 hours)     Procedure Component Value Units Date/Time    CT Head Without Contrast [[id number]] Collected:  01/28/19 1632     Updated:  01/28/19 1632    Narrative:       EXAMINATION: CT HEAD WO CONTRAST-01/28/2019:      INDICATION: Headache, seizure.     TECHNIQUE: Multiple axial CT imaging was obtained of the head from the  skull base to the skull vertex without the administration of intravenous  contrast.     The radiation dose reduction device was turned on for each scan per the  ALARA (As Low as Reasonably Achievable) protocol.     COMPARISON: NONE.     FINDINGS: The parenchyma is unremarkable in appearance. No hemorrhage or  hydrocephalus. No mass, mass effect, or midline shift. No abnormal  extra-axial  fluid collection is identified. The bony structures reveal  no evidence of osseous abnormality. The visualized paranasal sinuses are  clear. The mastoid air cells are patent.       Impression:       No acute intracranial abnormality identified.     D:  01/28/2019  E:  01/28/2019                        I have reviewed the medications:    Current Facility-Administered Medications:   •  diazePAM (VALIUM) tablet 10 mg, 10 mg, Oral, Q8H, Joni Prabhakar MD  •  folic acid (FOLVITE) tablet 1 mg, 1 mg, Oral, Daily, Anastasiia Vickers APRN  •  LORazepam (ATIVAN) tablet 1 mg, 1 mg, Oral, Q2H PRN **OR** LORazepam (ATIVAN) injection 1 mg, 1 mg, Intravenous, Q2H PRN, 1 mg at 01/28/19 2055 **OR** LORazepam (ATIVAN) tablet 2 mg, 2 mg, Oral, Q1H PRN **OR** LORazepam (ATIVAN) injection 2 mg, 2 mg, Intravenous, Q1H PRN, 2 mg at 01/29/19 0629 **OR** LORazepam (ATIVAN) injection 2 mg, 2 mg, Intravenous, Q15 Min PRN, 2 mg at 01/29/19 0738 **OR** LORazepam (ATIVAN) injection 2 mg, 2 mg, Intramuscular, Q15 Min PRN, Anastasiia Vickers APRN  •  multivitamin with minerals 1 tablet, 1 tablet, Oral, Daily, Anastasiia Vickers APRN  •  nicotine (NICODERM CQ) 21 MG/24HR patch 1 patch, 1 patch, Transdermal, Q24H, Anastasiia Vickers APRN, 1 patch at 01/28/19 2058  •  sodium chloride 0.9 % flush 10 mL, 10 mL, Intravenous, PRN, Bennett Izquierdo MD  •  sodium chloride 0.9 % flush 3 mL, 3 mL, Intravenous, Q12H, Anastasiia Vickers APRN, 3 mL at 01/28/19 2250  •  sodium chloride 0.9 % flush 3-10 mL, 3-10 mL, Intravenous, PRN, Anastasiia Vickers APRN  •  sodium chloride 0.9 % infusion, 100 mL/hr, Intravenous, Continuous, Anastasiia Vickers, APRN, Last Rate: 100 mL/hr at 01/29/19 0724, 100 mL/hr at 01/29/19 0724      Assessment/Plan   Assessment / Plan     Active Hospital Problems    Diagnosis Date Noted   • **Alcohol withdrawal with delirium (CMS/HCC) [F10.231] 01/29/2019   • Alcohol withdrawal seizure (CMS/[facility]) [F10.239, R56.9] 01/28/2019    • Thrombocytopenia (CMS/HCC) [D69.6] 01/28/2019   • Elevated liver enzymes [R74.8] 01/28/2019   • Polysubstance abuse (CMS/HCC) [F19.10] 01/28/2019   • Alcoholic hepatitis [K70.10] 01/28/2019          Brief Hospital Course to date:  Dung Knight is a 33 y.o. male with past medical history of alcohol abuse and dependence who reportedly drinks 18 total 16 ounce [employer] best beers daily, presents to the hospital following a seizure that occurred at home that was witnessed by his wife.  Patient abruptly discontinue drinking due to intractable nausea and vomiting and diarrhea that occurred approximately 2 days prior to admission.    Alcohol withdrawal with delirium  Alcohol withdrawal seizure  Probable acute viral gastroenteritis  Alcoholic hepatitis, mild  Alcohol abuse and dependence, cessation counseled  Thrombocytopenia likely due to alcoholic hepatitis and alcohol abuse  Tobacco use, cessation counseling  Chronic tremors  History migraines    Plan:  Plan for supportive care and symptomatic treatment.  Continue IV fluid.  IV thiamine.  Continue alcohol withdrawal protocol with Ativan as needed.  Plan to schedule volume due to the severity of withdrawal.  Plan to adjust as needed.  Encourage nutrition once more awake and eating for alcoholic hepatitis.  Plan to trend INR and LFTs.  Social work consult for counseling patient regarding alcohol withdrawal.  EEG.  CT head reviewed images and appear normal for age.  Laboratory studies tomorrow.    DVT Prophylaxis:  Lovenox    Disposition: I expect the patient to be discharged TBD    CODE STATUS:   Code Status and Medical Interventions:   Ordered at: 01/28/19 2032     Code Status:    CPR     Medical Interventions (Level of Support Prior to Arrest):    Full         Electronically signed by Joni Prabhakar MD, 01/29/19, 8:06 AM.        Electronically signed by Joni Prabhakar MD at 1/29/2019  9:30 AM       Consult Notes (all)     No notes of this type  exist for this encounter.          Ana Manning APRN   Nurse Practitioner   Hospitalists   Discharge Summary   Attested   Date of Service:  1/31/2019  9:40 AM               Attested        Attestation signed by Joni Prabhakar MD at 1/31/2019 3:39 PM   Dung Knight is a 33 y.o. male with past medical history of alcohol abuse and dependence who reportedly drinks 18 total 16 ounce [employer] best beers daily, presents to the hospital following a seizure that occurred at home that was witnessed by his wife.  Patient abruptly discontinue drinking due to intractable nausea and vomiting and diarrhea that occurred approximately 2 days prior to admission.     Patient required treatment with Valium and Ativan for alcohol withdrawal and his confusion and agitation greatly improved.  He was requesting discharge home today.  Prolonged half-life of volume should allow for medication to self taper as a gradually wears off.  EEG was reviewed with  the reading neurologist who felt patient did not need a AED but needed to discontinue alcohol use permanently.  Patient was provided by social work resources for sobriety.  Patient stated he wanted to quit drinking.  I encouraged him to go to rehabilitation.  Patient was counseled regarding [facility] 's loss and agree not to drive a car for at least 3 months.     On the day of discharge patient examined and confusion had resolved on examination.  Lungs were clear, breathing normal, agitation resolved, heart regular.     Patient agrees to follow up with his primary care physician within one week.     At the time of discharge patient was told to take all medications as prescribed, keep all follow-up appointments, and call their doctor or return to the hospital with any worsening or concerning symptoms.     Please note that dragon voice recognition software was used to create this note and that transcription errors are possible.                        Show:Clear  all  [x]Manual[x]Template[]Copied    Added by:  [x]Ana Manning APRN      []River for details           [facility] Medicine Services  DISCHARGE SUMMARY     Patient Name: Dung Knight  : 1985  MRN: [id number]     Date of Admission: 2019  Date of Discharge:  2019  Primary Care Physician: Yinka Santiago DO         Consults      No orders found from 2018 to 2019.             Hospital Course      Presenting Problem:   Alcohol withdrawal seizure (CMS/HCC) [F10.239, R56.9]          Active Hospital Problems     Diagnosis Date Noted   • **Alcohol withdrawal with delirium (CMS/HCC) [F10.231] 2019   • Alcohol withdrawal seizure (CMS/HCC) [F10.239, R56.9] 2019   • Thrombocytopenia (CMS/HCC) [D69.6] 2019   • Elevated liver enzymes [R74.8] 2019   • Polysubstance abuse (CMS/HCC) [F19.10] 2019   • Alcoholic hepatitis [K70.10] 2019       Resolved Hospital Problems   No resolved problems to display.            Hospital Course:  Dung Knight is a 33 y.o. male with a past medical history of alcohol abuse and dependence who reportedly drinks 18 total 16 oz beers daily.  He presented to the hospital following a seizure that occurred at home and was witnessed by his SO.  He abruptly discontinued drinking due to intractable nausea and vomiting that occurred approximately 2 days prior to admission.  He was admitted to hospital medicine services and placed on CIWA protocol, given IV fluids, and IV thiamine.  The patient utilized a great deal of ativan due to some delirium and was eventually placed on scheduled valium.  The patient did not have any further seizures. He was seen by social work and was given information about alcohol cessation.  Per his SO, he will likely return to drinking.  The patient is now medically stable and will be discharged home. He will need to follow up with a new pcp, and this appointment has been made for him.           Day of Discharge      HPI:   Resting in bed with SO.  Feeling well.  No further anxiety or tremors.  Wants to go home.       Review of Systems  Gen- No fevers, chills  CV- No chest pain, palpitations  Resp- No cough, dyspnea  GI- No N/V/D, abd pain        Otherwise ROS is negative except as mentioned in the HPI.     Vital Signs:   Temp:  [97.9 °F (36.6 °C)-98.4 °F (36.9 °C)] 97.9 °F (36.6 °C)  Resp:  [16-18] 18  BP: (130-140)/() 134/99      Physical Exam:  Constitutional: No acute distress, awake, alert  HENT: NCAT, mucous membranes moist  Respiratory: Clear to auscultation bilaterally, respiratory effort normal   Cardiovascular: RRR, no murmurs, rubs, or gallops, palpable pedal pulses bilaterally  Gastrointestinal: Positive bowel sounds, soft, nontender, nondistended  Musculoskeletal: No bilateral ankle edema  Psychiatric: Appropriate affect, cooperative  Neurologic: Oriented x 3, strength symmetric in all extremities, Cranial Nerves grossly intact to confrontation, speech clear  Skin: No rashes        Pertinent  and/or Most Recent Results             Results from last 7 days   Lab Units 01/30/19  0609 01/29/19  0825 01/28/19  1420   WBC 10*3/mm3 6.18  --  10.40   HEMOGLOBIN g/dL 14.3  --  17.1   HEMATOCRIT % 43.1  --  48.8   PLATELETS 10*3/mm3 58*  --  50*   SODIUM mmol/L 135 134 132   POTASSIUM mmol/L 3.6 3.5 4.4   CHLORIDE mmol/L 105 105 98*   CO2 mmol/L 26.0 23.0 21.0   BUN mg/dL 8* 8* 6*   CREATININE mg/dL 0.61 0.58* 0.76   GLUCOSE mg/dL 88 76 137*   CALCIUM mg/dL 8.3* 7.8* 8.8              Results from last 7 days   Lab Units 01/30/19  0609 01/29/19  0825 01/28/19 2113 01/28/19  1420   BILIRUBIN mg/dL 0.6 1.1  --  1.1   ALK PHOS U/L 143* 135*  --  173*   ALT (SGPT) U/L 130* 127*  --  176*   AST (SGOT) U/L 133* 140*  --  212*   PROTIME Seconds 12.7 12.7 12.9 12.2   INR   1.00 1.00 1.02 0.95   APTT seconds  --   --   --  32.3             Invalid input(s): TG, LDLCALC, LDLREALC       Results from  last 7 days   Lab Units 01/28/19  1420   TSH mIU/mL 2.426   TROPONIN I ng/mL <0.006             Brief Urine Lab Results      None                 Microbiology Results Abnormal      None                     Imaging Results (all)      Procedure Component Value Units Date/Time     CT Head Without Contrast [[id number]] Collected:  01/28/19 1632       Updated:  01/30/19 0957     Narrative:        EXAMINATION: CT HEAD WO CONTRAST-01/28/2019:      INDICATION: Headache, seizure.     TECHNIQUE: Multiple axial CT imaging was obtained of the head from the  skull base to the skull vertex without the administration of intravenous  contrast.     The radiation dose reduction device was turned on for each scan per the  ALARA (As Low as Reasonably Achievable) protocol.     COMPARISON: NONE.     FINDINGS: The parenchyma is unremarkable in appearance. No hemorrhage or  hydrocephalus. No mass, mass effect, or midline shift. No abnormal  extra-axial fluid collection is identified. The bony structures reveal  no evidence of osseous abnormality. The visualized paranasal sinuses are  clear. The mastoid air cells are patent.        Impression:        No acute intracranial abnormality identified.     D:  01/28/2019  E:  01/28/2019           This report was finalized on 1/30/2019 9:54 AM by Dr. Margaret Scruggs MD.        XR Chest PA & Lateral [[id number]] Collected:  01/29/19 1636       Updated:  01/29/19 1746     Narrative:        EXAMINATION: XR CHEST PA AND LATERAL- 01/29/2019     INDICATION: Right rib pain; F10.239-Alcohol dependence with withdrawal,  unspecified; R56.9-Unspecified convulsions      COMPARISON: NONE     FINDINGS: Cardiac silhouette within normal limits. Mild prominence of  the perihilar lung markings may suggest peribronchial inflammatory  process without focal consolidation otherwise. No pneumothorax or  significant effusion. No acute osseous abnormality specifically no  displaced right rib fracture or cortical  irregularity to suggest osseous  abnormality of the right ribs.            Impression:        Mild prominence of the perihilar lung markings along with  peribronchial cuffing suggesting peribronchial inflammatory process such  as bronchitis without focal consolidation of pneumonia. No significant  pleural effusion or displaced rib fracture on the right is identified.     D:  01/29/2019  E:  01/29/2019     This report was finalized on 1/29/2019 5:44 PM by Dr. Emanuel Camara.                         Discharge Details                 Discharge Medications             New Medications      Instructions Start Date   diazePAM 5 MG tablet  Commonly known as:  VALIUM    5 mg, Oral, 2 Times Daily        folic acid 1 MG tablet  Commonly known as:  FOLVITE    1 mg, Oral, Daily        multivitamin with minerals tablet tablet    1 tablet, Oral, Daily                      Continue These Medications      Instructions Start Date   albuterol sulfate  (90 Base) MCG/ACT inhaler  Commonly known as:  PROVENTIL HFA;VENTOLIN HFA;PROAIR HFA    2 puffs, Inhalation, Every 4 Hours PRN            Stop These Medications    meloxicam 15 MG tablet  Commonly known as:  MOBIC                      Allergies   Allergen Reactions   • Penicillins Unknown (See Comments)       Unknown reaction            Discharge Disposition:  Home or Self Care     Discharge Diet:      Diet Order   Procedures   • Diet Regular            Discharge Activity:       Activity Instructions      Activity as Tolerated               CODE STATUS:        Code Status and Medical Interventions:   Ordered at: 01/28/19 2032     Code Status:     CPR     Medical Interventions (Level of Support Prior to Arrest):     Full                   Future Appointments   Date Time Provider Department Center   2/4/2019  1:15 PM Han Reyes APRN [facility] None              Additional Instructions for the Follow-ups that You Need to Schedule      Discharge Follow-up with PCP   As  directed         Currently Documented PCP:    Yinka Santiago DO    PCP Phone Number:    [phone]      Follow Up Details:  first available hospital follow up appt.                   Time Spent on Discharge:  45 minutes     Electronically signed by BLAINE Alvarenga, 01/31/19, 10:16 AM.                      Cosigned by: Joni Prabhakar MD at 1/31/2019  3:39 PM   Revision History                        Routing History

## 2019-02-07 ENCOUNTER — OFFICE VISIT (OUTPATIENT)
Dept: FAMILY MEDICINE CLINIC | Facility: CLINIC | Age: 34
End: 2019-02-07

## 2019-02-07 VITALS
HEIGHT: 67 IN | DIASTOLIC BLOOD PRESSURE: 90 MMHG | BODY MASS INDEX: 26.68 KG/M2 | HEART RATE: 104 BPM | SYSTOLIC BLOOD PRESSURE: 130 MMHG | WEIGHT: 170 LBS | OXYGEN SATURATION: 99 %

## 2019-02-07 DIAGNOSIS — K70.10 ALCOHOLIC HEPATITIS, UNSPECIFIED WHETHER ASCITES PRESENT: Primary | ICD-10-CM

## 2019-02-07 DIAGNOSIS — I10 ESSENTIAL HYPERTENSION: ICD-10-CM

## 2019-02-07 DIAGNOSIS — R74.8 ELEVATED LIVER ENZYMES: ICD-10-CM

## 2019-02-07 DIAGNOSIS — F17.200 TOBACCO USE DISORDER: ICD-10-CM

## 2019-02-07 DIAGNOSIS — Z71.6 ENCOUNTER FOR SMOKING CESSATION COUNSELING: ICD-10-CM

## 2019-02-07 DIAGNOSIS — R25.1 TREMOR: ICD-10-CM

## 2019-02-07 PROCEDURE — 99406 BEHAV CHNG SMOKING 3-10 MIN: CPT | Performed by: NURSE PRACTITIONER

## 2019-02-07 PROCEDURE — 99214 OFFICE O/P EST MOD 30 MIN: CPT | Performed by: NURSE PRACTITIONER

## 2019-02-07 RX ORDER — DIAZEPAM 5 MG/1
5 TABLET ORAL 2 TIMES DAILY
Qty: 45 TABLET | Refills: 0 | Status: SHIPPED | OUTPATIENT
Start: 2019-02-07 | End: 2019-03-07 | Stop reason: SDUPTHER

## 2019-02-07 NOTE — PROGRESS NOTES
Chief Complaint   Patient presents with   • Scoliosis   • Seizures      alchol induced seizure, he fell out of a chair while siezing and damaging his ribs    • Tremors     has had body tremors since childhood, has not received adequate care for this condition. Used to take carvedilol but discontinued use after it didnt seem to be helping much        Subjective   Dung Knight is a 33 y.o. male    History of Present Illness   Hospital Records-  Date of Admission: 1/28/2019  Date of Discharge:  1/31/2019     Dung Knight is a 33 y.o. male with past medical history of alcohol abuse and dependence who reportedly drinks 18 total 16 ounce [employer]'s best beers daily, presents to the hospital following a seizure that occurred at home that was witnessed by his wife.  Patient abruptly discontinue drinking due to intractable nausea and vomiting and diarrhea that occurred approximately 2 days prior to admission.     Patient required treatment with Valium and Ativan for alcohol withdrawal and his confusion and agitation greatly improved.  He was requesting discharge home today.  Prolonged half-life of volume should allow for medication to self taper as a gradually wears off.  EEG was reviewed with  the reading neurologist who felt patient did not need a AED but needed to discontinue alcohol use permanently.  Patient was provided by social work resources for sobriety.  Patient stated he wanted to quit drinking.  I encouraged him to go to rehabilitation.  Patient was counseled regarding [facility] 's loss and agree not to drive a car for at least 3 months.     On the day of discharge patient examined and confusion had resolved on examination.  Lungs were clear, breathing normal, agitation resolved, heart regular.     Patient agrees to follow up with his primary care physician within one week.     At the time of discharge patient was told to take all medications as prescribed, keep all follow-up appointments, and call  their doctor or return to the hospital with any worsening or concerning symptoms.    Hospital Course:  Dung Knight is a 33 y.o. male with a past medical history of alcohol abuse and dependence who reportedly drinks 18 total 16 oz beers daily.  He presented to the hospital following a seizure that occurred at home and was witnessed by his SO.  He abruptly discontinued drinking due to intractable nausea and vomiting that occurred approximately 2 days prior to admission.  He was admitted to hospital medicine services and placed on CIWA protocol, given IV fluids, and IV thiamine.  The patient utilized a great deal of ativan due to some delirium and was eventually placed on scheduled valium.  The patient did not have any further seizures. He was seen by social work and was given information about alcohol cessation.  Per his SO, he will likely return to drinking.  The patient is now medically stable and will be discharged home. He will need to follow up with a new pcp, and this appointment has been made for him.        2/7/2019  Pt in to establish care. Pt has a HX of increased ETOH intake, Pt went to a 5 day detox in past, then AA, Then since APR 2018, drinking 12-18 beers per day, weaned down to 6-8 per day, then 1 beer per day, had seizure and went to hospital, Pt currently drinking no alcohol.     Pt has a HX of HTN and a life long HX of tremors, took Coreg thinks was 6.25 mg BID, has been off form past couple months, Also with insomnia- generally 4 hrs at most per night. Valium does not seem to help.     Smoking 1.5 ppd, started age 15, has vapped in the past.     Pt has a HX of ADD, ETOH helped this,     Allergies   Allergen Reactions   • Penicillins Unknown (See Comments)     Unknown reaction     Past Medical History:   Diagnosis Date   • Asthma    • Hypertension    • Scoliosis    • Seizures (CMS/HCC)       Past Surgical History:   Procedure Laterality Date   • COLONOSCOPY     • HERNIA REPAIR     • TESTICLE SURGERY        Social History     Socioeconomic History   • Marital status:      Spouse name: Not on file   • Number of children: Not on file   • Years of education: Not on file   • Highest education level: Not on file   Social Needs   • Financial resource strain: Not on file   • Food insecurity - worry: Not on file   • Food insecurity - inability: Not on file   • Transportation needs - medical: Not on file   • Transportation needs - non-medical: Not on file   Occupational History   • Not on file   Tobacco Use   • Smoking status: Current Every Day Smoker     Packs/day: 1.00     Types: Cigarettes     Start date: 2/7/2004   • Smokeless tobacco: Never Used   Substance and Sexual Activity   • Alcohol use: Yes     Alcohol/week: 7.2 - 10.8 oz     Types: 12 - 18 Cans of beer per week     Comment: has not been drinking recently    • Drug use: Yes     Types: Marijuana   • Sexual activity: Defer   Other Topics Concern   • Not on file   Social History Narrative   • Not on file        Current Outpatient Medications:   •  albuterol sulfate  (90 Base) MCG/ACT inhaler, Inhale 2 puffs Every 4 (Four) Hours As Needed for Wheezing., Disp: , Rfl:   •  diazePAM (VALIUM) 5 MG tablet, Take 1 tablet by mouth 2 (Two) Times a Day., Disp: 45 tablet, Rfl: 0  •  folic acid (FOLVITE) 1 MG tablet, Take 1 tablet by mouth Daily., Disp: 30 tablet, Rfl: 0  •  Multiple Vitamins-Minerals (MULTIVITAMIN WITH MINERALS) tablet tablet, Take 1 tablet by mouth Daily., Disp: 30 tablet, Rfl: 0     Review of Systems   Constitutional: Positive for fatigue. Negative for chills and fever.   Eyes: Positive for itching.   Respiratory: Positive for cough, shortness of breath and wheezing.    Cardiovascular: Positive for palpitations. Negative for chest pain.   Gastrointestinal: Negative for constipation, diarrhea, nausea and vomiting.   Genitourinary: Negative for difficulty urinating and dysuria.   Musculoskeletal: Positive for arthralgias, joint swelling and  myalgias.   Skin: Positive for rash.   Neurological: Positive for headaches.   Psychiatric/Behavioral: Positive for dysphoric mood and sleep disturbance. The patient is nervous/anxious.        Objective     Vitals:    02/07/19 1449   BP: 130/90   Pulse: 104   SpO2: 99%       Results for orders placed or performed during the hospital encounter of 01/28/19   Comprehensive Metabolic Panel   Result Value Ref Range    Glucose 137 (H) 70 - 100 mg/dL    BUN 6 (L) 9 - 23 mg/dL    Creatinine 0.76 0.60 - 1.30 mg/dL    Sodium 132 132 - 146 mmol/L    Potassium 4.4 3.5 - 5.5 mmol/L    Chloride 98 (L) 99 - 109 mmol/L    CO2 21.0 20.0 - 31.0 mmol/L    Calcium 8.8 8.7 - 10.4 mg/dL    Total Protein 7.0 5.7 - 8.2 g/dL    Albumin 4.49 3.20 - 4.80 g/dL    ALT (SGPT) 176 (H) 7 - 40 U/L    AST (SGOT) 212 (H) 0 - 33 U/L    Alkaline Phosphatase 173 (H) 25 - 100 U/L    Total Bilirubin 1.1 0.3 - 1.2 mg/dL    eGFR Non African Amer 119 >60 mL/min/1.73    Globulin 2.5 gm/dL    A/G Ratio 1.8 1.5 - 2.5 g/dL    BUN/Creatinine Ratio 7.9 7.0 - 25.0    Anion Gap 13.0 (H) 3.0 - 11.0 mmol/L   Lipase   Result Value Ref Range    Lipase 97 (H) 6 - 51 U/L   Magnesium   Result Value Ref Range    Magnesium 2.1 1.3 - 2.7 mg/dL   Troponin   Result Value Ref Range    Troponin I <0.006 <=0.039 ng/mL   CBC Auto Differential   Result Value Ref Range    WBC 10.40 3.50 - 10.80 10*3/mm3    RBC 5.24 4.20 - 5.76 10*6/mm3    Hemoglobin 17.1 13.1 - 17.5 g/dL    Hematocrit 48.8 38.9 - 50.9 %    MCV 93.1 80.0 - 99.0 fL    MCH 32.6 (H) 27.0 - 31.0 pg    MCHC 35.0 32.0 - 36.0 g/dL    RDW 14.2 11.3 - 14.5 %    RDW-SD 48.4 37.0 - 54.0 fl    Platelets 50 (L) 150 - 450 10*3/mm3    Neutrophil % 78.3 (H) 41.0 - 71.0 %    Lymphocyte % 10.0 (L) 24.0 - 44.0 %    Monocyte % 10.3 0.0 - 12.0 %    Eosinophil % 0.5 0.0 - 3.0 %    Basophil % 0.9 0.0 - 1.0 %    Immature Grans % 0.3 0.0 - 0.6 %    Neutrophils, Absolute 8.15 1.50 - 8.30 10*3/mm3    Lymphocytes, Absolute 1.04 0.60 - 4.80  10*3/mm3    Monocytes, Absolute 1.07 (H) 0.00 - 1.00 10*3/mm3    Eosinophils, Absolute 0.05 0.00 - 0.30 10*3/mm3    Basophils, Absolute 0.09 0.00 - 0.20 10*3/mm3    Immature Grans, Absolute 0.03 0.00 - 0.03 10*3/mm3    nRBC 0.0 0.0 - 0.0 /100 WBC   Scan Slide   Result Value Ref Range    RBC Morphology Normal Normal    WBC Morphology Normal Normal    Large Platelets Slight/1+ None Seen   aPTT   Result Value Ref Range    PTT 32.3 24.0 - 37.0 seconds   Protime-INR   Result Value Ref Range    Protime 12.2 11.2 - 14.3 Seconds    INR 0.95 0.85 - 1.16   Urine Drug Screen - Urine, Clean Catch   Result Value Ref Range    THC, Screen, Urine Negative Negative    Phencyclidine (PCP), Urine Negative Negative    Cocaine Screen, Urine Negative Negative    Methamphetamine, Urine Negative Negative    Opiate Screen Negative Negative    Amphetamine Screen, Urine Negative Negative    Benzodiazepine Screen, Urine Positive (A) Negative    Tricyclic Antidepressants Screen Negative Negative    Methadone Screen, Urine Negative Negative    Barbiturates Screen, Urine Negative Negative    Oxycodone Screen, Urine Negative Negative    Propoxyphene Screen Negative Negative    Buprenorphine, Screen, Urine Negative Negative   Protime-INR   Result Value Ref Range    Protime 12.9 11.2 - 14.3 Seconds    INR 1.02 0.85 - 1.16   TSH   Result Value Ref Range    TSH 2.426 0.350 - 5.350 mIU/mL   Comprehensive Metabolic Panel   Result Value Ref Range    Glucose 76 70 - 100 mg/dL    BUN 8 (L) 9 - 23 mg/dL    Creatinine 0.58 (L) 0.60 - 1.30 mg/dL    Sodium 134 132 - 146 mmol/L    Potassium 3.5 3.5 - 5.5 mmol/L    Chloride 105 99 - 109 mmol/L    CO2 23.0 20.0 - 31.0 mmol/L    Calcium 7.8 (L) 8.7 - 10.4 mg/dL    Total Protein 5.9 5.7 - 8.2 g/dL    Albumin 3.48 3.20 - 4.80 g/dL    ALT (SGPT) 127 (H) 7 - 40 U/L    AST (SGOT) 140 (H) 0 - 33 U/L    Alkaline Phosphatase 135 (H) 25 - 100 U/L    Total Bilirubin 1.1 0.3 - 1.2 mg/dL    eGFR Non African Amer >150 >60  mL/min/1.73    Globulin 2.4 gm/dL    A/G Ratio 1.4 (L) 1.5 - 2.5 g/dL    BUN/Creatinine Ratio 13.8 7.0 - 25.0    Anion Gap 6.0 3.0 - 11.0 mmol/L   Protime-INR   Result Value Ref Range    Protime 12.7 11.2 - 14.3 Seconds    INR 1.00 0.85 - 1.16   Comprehensive Metabolic Panel   Result Value Ref Range    Glucose 88 70 - 100 mg/dL    BUN 8 (L) 9 - 23 mg/dL    Creatinine 0.61 0.60 - 1.30 mg/dL    Sodium 135 132 - 146 mmol/L    Potassium 3.6 3.5 - 5.5 mmol/L    Chloride 105 99 - 109 mmol/L    CO2 26.0 20.0 - 31.0 mmol/L    Calcium 8.3 (L) 8.7 - 10.4 mg/dL    Total Protein 5.8 5.7 - 8.2 g/dL    Albumin 3.44 3.20 - 4.80 g/dL    ALT (SGPT) 130 (H) 7 - 40 U/L    AST (SGOT) 133 (H) 0 - 33 U/L    Alkaline Phosphatase 143 (H) 25 - 100 U/L    Total Bilirubin 0.6 0.3 - 1.2 mg/dL    eGFR Non African Amer >150 >60 mL/min/1.73    Globulin 2.4 gm/dL    A/G Ratio 1.5 1.5 - 2.5 g/dL    BUN/Creatinine Ratio 13.1 7.0 - 25.0    Anion Gap 4.0 3.0 - 11.0 mmol/L   CBC (No Diff)   Result Value Ref Range    WBC 6.18 3.50 - 10.80 10*3/mm3    RBC 4.47 4.20 - 5.76 10*6/mm3    Hemoglobin 14.3 13.1 - 17.5 g/dL    Hematocrit 43.1 38.9 - 50.9 %    MCV 96.4 80.0 - 99.0 fL    MCH 32.0 (H) 27.0 - 31.0 pg    MCHC 33.2 32.0 - 36.0 g/dL    RDW 14.4 11.3 - 14.5 %    RDW-SD 51.4 37.0 - 54.0 fl    MPV 13.0 (H) 6.0 - 12.0 fL    Platelets 58 (L) 150 - 450 10*3/mm3   Protime-INR   Result Value Ref Range    Protime 12.7 11.2 - 14.3 Seconds    INR 1.00 0.85 - 1.16   POC Glucose Once   Result Value Ref Range    Glucose 80 70 - 130 mg/dL   POC Glucose Once   Result Value Ref Range    Glucose 73 70 - 130 mg/dL   POC Glucose Once   Result Value Ref Range    Glucose 69 (L) 70 - 130 mg/dL   POC Glucose Once   Result Value Ref Range    Glucose 80 70 - 130 mg/dL   POC Glucose Once   Result Value Ref Range    Glucose 90 70 - 130 mg/dL   POC Glucose Once   Result Value Ref Range    Glucose 131 (H) 70 - 130 mg/dL   POC Glucose Once   Result Value Ref Range    Glucose 93 70 -  130 mg/dL   POC Glucose Once   Result Value Ref Range    Glucose 116 70 - 130 mg/dL   Light Blue Top   Result Value Ref Range    Extra Tube hold for add-on    Green Top (Gel)   Result Value Ref Range    Extra Tube Hold for add-ons.    Lavender Top   Result Value Ref Range    Extra Tube hold for add-on    Gold Top - SST   Result Value Ref Range    Extra Tube Hold for add-ons.        Physical Exam   Constitutional: He is oriented to person, place, and time. He appears well-developed and well-nourished.   HENT:   Head: Normocephalic and atraumatic.   Cardiovascular: Normal rate, regular rhythm and normal heart sounds.   Pulmonary/Chest: Effort normal and breath sounds normal.   Musculoskeletal: He exhibits no edema.   Neurological: He is alert and oriented to person, place, and time.   Skin: Skin is warm and dry.   Psychiatric: He has a normal mood and affect. His behavior is normal.   Vitals reviewed.      Assessment/Plan   Problem List Items Addressed This Visit        Cardiovascular and Mediastinum    Essential hypertension       Digestive    Alcoholic hepatitis - Primary       Other    Elevated liver enzymes    Tremor    Relevant Medications    diazePAM (VALIUM) 5 MG tablet    Encounter for smoking cessation counseling    Tobacco use disorder      Pt to restart Coreg he has at home and check HTN and tremor. If OK will cont, if not consider switching med. Patient instructed to check blood pressure daily, record, and bring to next visit. If greater than 150/90, patient is to call my office or return sooner for evaluation. Pt advised to eat a heart healthy diet and get regular aerobic exercise.    Cont no alcohol. Go to AA. Will repeat LFTs next visit.     I advised the patients of the risks in continuing to use tobacco, and I provided this patient with smoking cessation educational materials and discussed how to quit smoking and patient has epxressed the willingness to quit. Discussed with the patient the importance  of tobacco cessation. Discussed various medications for this, including Wellbutrin, Chantix, Nicotine patches, Nicotine Inhalers, and electronic cigarettes. Discussed the need to set a quit date, have a plan written for when cravings occur, and times when they normally smoke and what to do instead of smoke. Discussed this works best if all members of same family quit together. Instructed for   5        Min.     Pt to wean down to QD on Valium by next visit, with goal of getting off in near future.     RV 4 wks    Counseling provided on alcohol use.    Han Reyes, APRN   2/8/2019   1:07 PM

## 2019-02-07 NOTE — PATIENT INSTRUCTIONS
Alcohol Abuse and Nutrition  Alcohol abuse is any pattern of alcohol consumption that harms your health, relationships, or work. Alcohol abuse can affect how your body breaks down and absorbs nutrients from food by causing your liver to work abnormally. Additionally, many people who abuse alcohol do not eat enough carbohydrates, protein, fat, vitamins, and minerals. This can cause poor nutrition (malnutrition) and a lack of nutrients (nutrient deficiencies), which can lead to further complications.  Nutrients that are commonly lacking (deficient) among people who abuse alcohol include:  · Vitamins.  ? Vitamin A. This is stored in your liver. It is important for your vision, metabolism, and ability to fight off infections (immunity).  ? B vitamins. These include vitamins such as folate, thiamin, and niacin. These are important in new cell growth and maintenance.  ? Vitamin C. This plays an important role in iron absorption, wound healing, and immunity.  ? Vitamin D. This is produced by your liver, but you can also get vitamin D from food. Vitamin D is necessary for your body to absorb and use calcium.  · Minerals.  ? Calcium. This is important for your bones and your heart and blood vessel (cardiovascular) function.  ? Iron. This is important for blood, muscle, and nervous system functioning.  ? Magnesium. This plays an important role in muscle and nerve function, and it helps to control blood sugar and blood pressure.  ? Zinc. This is important for the normal function of your nervous system and digestive system (gastrointestinal tract).    Nutrition is an essential component of therapy for alcohol abuse. Your health care provider or dietitian will work with you to design a plan that can help restore nutrients to your body and prevent potential complications.  What is my plan?  Your dietitian may develop a specific diet plan that is based on your condition and any other complications you may have. A diet plan will  commonly include:  · A balanced diet.  ? Grains: 6-8 oz per day.  ? Vegetables: 2-3 cups per day.  ? Fruits: 1-2 cups per day.  ? Meat and other protein: 5-6 oz per day.  ? Dairy: 2-3 cups per day.  · Vitamin and mineral supplements.    What do I need to know about alcohol and nutrition?  · Consume foods that are high in antioxidants, such as grapes, berries, nuts, green tea, and dark green and orange vegetables. This can help to counteract some of the stress that is placed on your liver by consuming alcohol.  · Avoid food and drinks that are high in fat and sugar. Foods such as sugared soft drinks, salty snack foods, and candy contain empty calories. This means that they lack important nutrients such as protein, fiber, and vitamins.  · Eat frequent meals and snacks. Try to eat 5-6 small meals each day.  · Eat a variety of fresh fruits and vegetables each day. This will help you get plenty of water, fiber, and vitamins in your diet.  · Drink plenty of water and other clear fluids. Try to drink at least 48-64 oz (1.5-2 L) of water per day.  · If you are a vegetarian, eat a variety of protein-rich foods. Pair whole grains with plant-based proteins at meals and snacks to obtain the greatest nutrient benefit from your food. For example, eat rice with beans, put peanut butter on whole-grain toast, or eat oatmeal with sunflower seeds.  · Soak beans and whole grains overnight before cooking. This can help your body to absorb the nutrients more easily.  · Include foods fortified with vitamins and minerals in your diet. Commonly fortified foods include milk, orange juice, cereal, and bread.  · If you are malnourished, your dietitian may recommend a high-protein, high-calorie diet. This may include:  ? 2,000-3,000 calories (kilocalories) per day.  ?  grams of protein per day.  · Your health care provider may recommend a complete nutritional supplement beverage. This can help to restore calories, protein, and vitamins to  your body. Depending on your condition, you may be advised to consume this instead of or in addition to meals.  · Limit your intake of caffeine. Replace drinks like coffee and black tea with decaffeinated coffee and herbal tea.  · Eat a variety of foods that are high in omega fatty acids. These include fish, nuts and seeds, and soybeans. These foods may help your liver to recover and may also stabilize your mood.  · Certain medicines may cause changes in your appetite, taste, and weight. Work with your health care provider and dietitian to make any adjustments to your medicines and diet plan.  · Include other healthy lifestyle choices in your daily routine.  ? Be physically active.  ? Get enough sleep.  ? Spend time doing activities that you enjoy.  · If you are unable to take in enough food and calories by mouth, your health care provider may recommend a feeding tube. This is a tube that passes through your nose and throat, directly into your stomach. Nutritional supplement beverages can be given to you through the feeding tube to help you get the nutrients you need.  · Take vitamin or mineral supplements as recommended by your health care provider.  What foods can I eat?  Grains  Enriched pasta. Enriched rice. Fortified whole-grain bread. Fortified whole-grain cereal. Barley. Brown rice. Quinoa. Millet.  Vegetables  All fresh, frozen, and canned vegetables. Spinach. Kale. Artichoke. Carrots. Winter squash and pumpkin. Sweet potatoes. Broccoli. Cabbage. Cucumbers. Tomatoes. Sweet peppers. Green beans. Peas. Corn.  Fruits  All fresh and frozen fruits. Berries. Grapes. [place]. Papaya. Guava. Cherries. Apples. Bananas. Peaches. Plums. Pineapple. Watermelon. Cantaloupe. Oranges. Avocado.  Meats and Other Protein Sources  Beef liver. Lean beef. Pork. Fresh and canned chicken. Fresh fish. Oysters. Sardines. Canned tuna. Shrimp. Eggs with yolks. Nuts and seeds. Peanut butter. Beans and lentils. Soybeans.  Tofu.  Dairy  Whole, low-fat, and nonfat milk. Whole, low-fat, and nonfat yogurt. Cottage cheese. Sour cream. Hard and soft cheeses.  Beverages  Water. Herbal tea. Decaffeinated coffee. Decaffeinated green tea. 100% fruit juice. 100% vegetable juice. Instant breakfast shakes.  Condiments  Ketchup. Mayonnaise. Mustard. Salad dressing. Barbecue sauce.  Sweets and Desserts  Sugar-free ice cream. Sugar-free pudding. Sugar-free gelatin.  Fats and Oils  Butter. Vegetable oil, flaxseed oil, olive oil, and walnut oil.  Other  Complete nutrition shakes. Protein bars. Sugar-free gum.  The items listed above may not be a complete list of recommended foods or beverages. Contact your dietitian for more options.  What foods are not recommended?  Grains  Sugar-sweetened breakfast cereals. Flavored instant oatmeal. Fried breads.  Vegetables  Breaded or deep-fried vegetables.  Fruits  Dried fruit with added sugar. Candied fruit. Canned fruit in syrup.  Meats and Other Protein Sources  Breaded or deep-fried meats.  Dairy  Flavored milks. Fried cheese curds or fried cheese sticks.  Beverages  Alcohol. Sugar-sweetened soft drinks. Sugar-sweetened tea. Caffeinated coffee and tea.  Condiments  Sugar. Honey. Agave nectar. Molasses.  Sweets and Desserts  Chocolate. Cake. Cookies. Candy.  Other  Potato chips. Pretzels. Salted nuts. Candied nuts.  The items listed above may not be a complete list of foods and beverages to avoid. Contact your dietitian for more information.  This information is not intended to replace advice given to you by your health care provider. Make sure you discuss any questions you have with your health care provider.  Document Released: 10/12/2006 Document Revised: 04/26/2017 Document Reviewed: 07/21/2015  Elsevier Interactive Patient Education © 2018 [employer]

## 2019-02-08 PROBLEM — Z71.6 ENCOUNTER FOR SMOKING CESSATION COUNSELING: Status: ACTIVE | Noted: 2019-02-08

## 2019-02-08 PROBLEM — F17.200 TOBACCO USE DISORDER: Status: ACTIVE | Noted: 2019-02-08

## 2019-02-19 ENCOUNTER — DOCUMENTATION (OUTPATIENT)
Dept: NEUROSURGERY | Facility: CLINIC | Age: 34
End: 2019-02-19

## 2019-03-07 ENCOUNTER — OFFICE VISIT (OUTPATIENT)
Dept: FAMILY MEDICINE CLINIC | Facility: CLINIC | Age: 34
End: 2019-03-07

## 2019-03-07 VITALS
TEMPERATURE: 98.5 F | BODY MASS INDEX: 26.68 KG/M2 | OXYGEN SATURATION: 98 % | DIASTOLIC BLOOD PRESSURE: 80 MMHG | WEIGHT: 170 LBS | SYSTOLIC BLOOD PRESSURE: 130 MMHG | HEART RATE: 107 BPM | HEIGHT: 67 IN

## 2019-03-07 DIAGNOSIS — G47.00 INSOMNIA, UNSPECIFIED TYPE: ICD-10-CM

## 2019-03-07 DIAGNOSIS — M54.6 CHRONIC MIDLINE THORACIC BACK PAIN: ICD-10-CM

## 2019-03-07 DIAGNOSIS — F41.9 ANXIETY: ICD-10-CM

## 2019-03-07 DIAGNOSIS — G89.29 CHRONIC MIDLINE THORACIC BACK PAIN: ICD-10-CM

## 2019-03-07 DIAGNOSIS — M25.50 ARTHRALGIA, UNSPECIFIED JOINT: Primary | ICD-10-CM

## 2019-03-07 DIAGNOSIS — R25.1 TREMOR: ICD-10-CM

## 2019-03-07 PROCEDURE — 99214 OFFICE O/P EST MOD 30 MIN: CPT | Performed by: NURSE PRACTITIONER

## 2019-03-07 RX ORDER — DIAZEPAM 2 MG/1
2 TABLET ORAL 2 TIMES DAILY
Qty: 60 TABLET | Refills: 0 | Status: ON HOLD | OUTPATIENT
Start: 2019-03-07 | End: 2020-11-02

## 2019-03-07 RX ORDER — TRAZODONE HYDROCHLORIDE 50 MG/1
50 TABLET ORAL NIGHTLY
Qty: 30 TABLET | Refills: 5 | Status: SHIPPED | OUTPATIENT
Start: 2019-03-07 | End: 2019-10-15 | Stop reason: SDUPTHER

## 2019-03-07 NOTE — PATIENT INSTRUCTIONS
Living With Anxiety  After being diagnosed with an anxiety disorder, you may be relieved to know why you have felt or behaved a certain way. It is natural to also feel overwhelmed about the treatment ahead and what it will mean for your life. With care and support, you can manage this condition and recover from it.  How to cope with anxiety  Dealing with stress  Stress is your body’s reaction to life changes and events, both good and bad. Stress can last just a few hours or it can be ongoing. Stress can play a major role in anxiety, so it is important to learn both how to cope with stress and how to think about it differently.  Talk with your health care provider or a counselor to learn more about stress reduction. He or she may suggest some stress reduction techniques, such as:  · Music therapy. This can include creating or listening to music that you enjoy and that inspires you.  · Mindfulness-based meditation. This involves being aware of your normal breaths, rather than trying to control your breathing. It can be done while sitting or walking.  · Centering prayer. This is a kind of meditation that involves focusing on a word, phrase, or sacred image that is meaningful to you and that brings you peace.  · Deep breathing. To do this, expand your stomach and inhale slowly through your nose. Hold your breath for 3-5 seconds. Then exhale slowly, allowing your stomach muscles to relax.  · Self-talk. This is a skill where you identify thought patterns that lead to anxiety reactions and correct those thoughts.  · Muscle relaxation. This involves tensing muscles then relaxing them.    Choose a stress reduction technique that fits your lifestyle and personality. Stress reduction techniques take time and practice. Set aside 5-15 minutes a day to do them. Therapists can offer training in these techniques. The training may be covered by some insurance plans. Other things you can do to manage stress include:  · Keeping a  stress diary. This can help you learn what triggers your stress and ways to control your response.  · Thinking about how you respond to certain situations. You may not be able to control everything, but you can control your reaction.  · Making time for activities that help you relax, and not feeling guilty about spending your time in this way.    Therapy combined with coping and stress-reduction skills provides the best chance for successful treatment.  Medicines  Medicines can help ease symptoms. Medicines for anxiety include:  · Anti-anxiety drugs.  · Antidepressants.  · Beta-blockers.    Medicines may be used as the main treatment for anxiety disorder, along with therapy, or if other treatments are not working. Medicines should be prescribed by a health care provider.  Relationships  Relationships can play a big part in helping you recover. Try to spend more time connecting with trusted friends and family members. Consider going to couples counseling, taking family education classes, or going to family therapy. Therapy can help you and others better understand the condition.  How to recognize changes in your condition  Everyone has a different response to treatment for anxiety. Recovery from anxiety happens when symptoms decrease and stop interfering with your daily activities at home or work. This may mean that you will start to:  · Have better concentration and focus.  · Sleep better.  · Be less irritable.  · Have more energy.  · Have improved memory.    It is important to recognize when your condition is getting worse. Contact your health care provider if your symptoms interfere with home or work and you do not feel like your condition is improving.  Where to find help and support:  You can get help and support from these sources:  · Self-help groups.  · Online and community organizations.  · A trusted spiritual leader.  · Couples counseling.  · Family education classes.  · Family therapy.    Follow these  instructions at home:  · Eat a healthy diet that includes plenty of vegetables, fruits, whole grains, low-fat dairy products, and lean protein. Do not eat a lot of foods that are high in solid fats, added sugars, or salt.  · Exercise. Most adults should do the following:  ? Exercise for at least 150 minutes each week. The exercise should increase your heart rate and make you sweat (moderate-intensity exercise).  ? Strengthening exercises at least twice a week.  · Cut down on caffeine, tobacco, alcohol, and other potentially harmful substances.  · Get the right amount and quality of sleep. Most adults need 7-9 hours of sleep each night.  · Make choices that simplify your life.  · Take over-the-counter and prescription medicines only as told by your health care provider.  · Avoid caffeine, alcohol, and certain over-the-counter cold medicines. These may make you feel worse. Ask your pharmacist which medicines to avoid.  · Keep all follow-up visits as told by your health care provider. This is important.  Questions to ask your health care provider  · Would I benefit from therapy?  · How often should I follow up with a health care provider?  · How long do I need to take medicine?  · Are there any long-term side effects of my medicine?  · Are there any alternatives to taking medicine?  Contact a health care provider if:  · You have a hard time staying focused or finishing daily tasks.  · You spend many hours a day feeling worried about everyday life.  · You become exhausted by worry.  · You start to have headaches, feel tense, or have nausea.  · You urinate more than normal.  · You have diarrhea.  Get help right away if:  · You have a racing heart and shortness of breath.  · You have thoughts of hurting yourself or others.  If you ever feel like you may hurt yourself or others, or have thoughts about taking your own life, get help right away. You can go to your nearest emergency department or call:  · Your local emergency  services (911 in the [place]  · A suicide crisis helpline, such as the National Suicide Prevention Lifeline at 1-377.210.9753. This is open 24-hours a day.    Summary  · Taking steps to deal with stress can help calm you.  · Medicines cannot cure anxiety disorders, but they can help ease symptoms.  · Family, friends, and partners can play a big part in helping you recover from an anxiety disorder.  This information is not intended to replace advice given to you by your health care provider. Make sure you discuss any questions you have with your health care provider.  Document Released: 12/12/2017 Document Revised: 12/12/2017 Document Reviewed: 12/12/2017  Elsevier Interactive Patient Education © 2018 [employer]

## 2019-06-29 ENCOUNTER — TELEPHONE (OUTPATIENT)
Dept: FAMILY MEDICINE CLINIC | Facility: CLINIC | Age: 34
End: 2019-06-29

## 2019-07-01 RX ORDER — CARVEDILOL 6.25 MG/1
6.25 TABLET ORAL 2 TIMES DAILY WITH MEALS
Qty: 60 TABLET | Refills: 5 | Status: SHIPPED | OUTPATIENT
Start: 2019-07-01 | End: 2020-11-12 | Stop reason: SDUPTHER

## 2019-09-24 ENCOUNTER — HOSPITAL ENCOUNTER (EMERGENCY)
Facility: HOSPITAL | Age: 34
Discharge: HOME | End: 2019-09-24
Attending: EMERGENCY MEDICINE | Admitting: EMERGENCY MEDICINE

## 2019-09-24 ENCOUNTER — APPOINTMENT (OUTPATIENT)
Dept: GENERAL RADIOLOGY | Facility: HOSPITAL | Age: 34
End: 2019-09-24

## 2019-09-24 VITALS
WEIGHT: 205.8 LBS | OXYGEN SATURATION: 98 % | DIASTOLIC BLOOD PRESSURE: 89 MMHG | SYSTOLIC BLOOD PRESSURE: 142 MMHG | HEART RATE: 88 BPM | RESPIRATION RATE: 16 BRPM | TEMPERATURE: 98.4 F | BODY MASS INDEX: 32.3 KG/M2 | HEIGHT: 67 IN

## 2019-09-24 DIAGNOSIS — T07.XXXA ABRASIONS OF MULTIPLE SITES: ICD-10-CM

## 2019-09-24 DIAGNOSIS — S62.301A CLOSED DISPLACED FRACTURE OF SECOND METACARPAL BONE OF LEFT HAND, UNSPECIFIED PORTION OF METACARPAL, INITIAL ENCOUNTER: Primary | ICD-10-CM

## 2019-09-24 PROCEDURE — 73630 X-RAY EXAM OF FOOT: CPT

## 2019-09-24 PROCEDURE — 99283 EMERGENCY DEPT VISIT LOW MDM: CPT

## 2019-09-24 PROCEDURE — 73130 X-RAY EXAM OF HAND: CPT

## 2019-09-24 PROCEDURE — 73590 X-RAY EXAM OF LOWER LEG: CPT

## 2019-09-24 RX ORDER — IBUPROFEN 200 MG
400 TABLET ORAL ONCE
Status: COMPLETED | OUTPATIENT
Start: 2019-09-24 | End: 2019-09-24

## 2019-09-24 RX ORDER — IBUPROFEN 600 MG/1
600 TABLET ORAL EVERY 8 HOURS PRN
Qty: 12 TABLET | Refills: 0 | Status: SHIPPED | OUTPATIENT
Start: 2019-09-24 | End: 2023-03-12 | Stop reason: HOSPADM

## 2019-09-24 RX ORDER — BACITRACIN ZINC AND POLYMYXIN B SULFATE 500; 1000 [USP'U]/G; [USP'U]/G
OINTMENT TOPICAL 2 TIMES DAILY
Qty: 15 G | Refills: 0 | Status: SHIPPED | OUTPATIENT
Start: 2019-09-24 | End: 2023-03-16

## 2019-09-24 RX ORDER — SENNOSIDES 8.6 MG
650 CAPSULE ORAL EVERY 8 HOURS PRN
Qty: 12 TABLET | Refills: 0 | Status: SHIPPED | OUTPATIENT
Start: 2019-09-24 | End: 2023-03-12 | Stop reason: HOSPADM

## 2019-09-24 RX ORDER — ACETAMINOPHEN 325 MG/1
650 TABLET ORAL ONCE
Status: COMPLETED | OUTPATIENT
Start: 2019-09-24 | End: 2019-09-24

## 2019-09-24 RX ORDER — BACITRACIN ZINC 500 [USP'U]/G
OINTMENT TOPICAL ONCE
Status: COMPLETED | OUTPATIENT
Start: 2019-09-24 | End: 2019-09-24

## 2019-09-24 RX ADMIN — ACETAMINOPHEN 650 MG: 325 TABLET, FILM COATED ORAL at 15:36

## 2019-09-24 RX ADMIN — IBUPROFEN 400 MG: 200 TABLET, FILM COATED ORAL at 15:36

## 2019-09-24 RX ADMIN — BACITRACIN ZINC: 500 OINTMENT TOPICAL at 16:18

## 2019-09-24 NOTE — ED PROVIDER NOTES
Subjective   The patient is here with complaint of some abrasions to his left leg and left great toe as well as soreness to his left hand he states he was helping a friend out yesterday evening slipped down an embankment no head neck or back injury no LOC amatory afterwards but some abrasions to his left lower leg and mostly soreness to his left hand no chest pain no shortness of air no abdominal pain no nausea vomiting no radicular symptoms reported again this occurred yesterday evening he presents here with complaint of mostly soreness to the left hand but does mention the abrasions as well here for further evaluation        History provided by:  Patient      Review of Systems   Constitutional: Negative.    HENT: Negative.    Eyes: Negative.    Respiratory: Negative.    Cardiovascular: Negative.    Gastrointestinal: Negative.    Genitourinary: Negative.    Musculoskeletal: Positive for arthralgias.   Skin: Positive for wound.   Neurological: Negative.    Psychiatric/Behavioral: Negative.    All other systems reviewed and are negative.      Past Medical History:   Diagnosis Date   • Asthma    • Hypertension    • Scoliosis    • Seizures (CMS/HCC)        Allergies   Allergen Reactions   • Penicillins Unknown (See Comments)     Unknown reaction       Past Surgical History:   Procedure Laterality Date   • COLONOSCOPY     • HERNIA REPAIR     • TESTICLE SURGERY         Family History   Problem Relation Age of Onset   • Arthritis Mother    • Arthritis Father        Social History     Socioeconomic History   • Marital status:      Spouse name: Not on file   • Number of children: Not on file   • Years of education: Not on file   • Highest education level: Not on file   Tobacco Use   • Smoking status: Current Every Day Smoker     Packs/day: 1.00     Types: Cigarettes     Start date: 2/7/2004   • Smokeless tobacco: Never Used   Substance and Sexual Activity   • Alcohol use: Yes     Comment: has not had a drink since  1/29    • Drug use: Yes     Types: Marijuana   • Sexual activity: Defer           Objective   Physical Exam   Constitutional: He is oriented to person, place, and time. He appears well-developed and well-nourished. No distress.   Afebrile nontoxic no acute distress ambulatory   HENT:   Head: Normocephalic and atraumatic.   Mouth/Throat: Oropharynx is clear and moist.   Eyes: Conjunctivae and EOM are normal. Pupils are equal, round, and reactive to light.   Neck: Normal range of motion. Neck supple.   Full range of motion no C-spine tenderness   Musculoskeletal: He exhibits tenderness. He exhibits no deformity.   Hips and pelvis are stable nontender no T-spine no L-spine tenderness he has some minimal tenderness to the pretibial aspect on the left and left foot, small abrasion to the left great toe and abrasions to the pretibial aspect left is well also some tenderness dorsum left hand with some slight edema over the second metacarpal he is able to flex and extend neurovascular intact   Neurological: He is alert and oriented to person, place, and time. He has normal strength. No cranial nerve deficit or sensory deficit. He exhibits normal muscle tone. Coordination and gait normal. GCS eye subscore is 4. GCS verbal subscore is 5. GCS motor subscore is 6.   Skin: Skin is warm and dry. Capillary refill takes less than 2 seconds. He is not diaphoretic.   Abrasions as aforementioned in the musculoskeletal exam   Psychiatric: He has a normal mood and affect. His behavior is normal. Judgment and thought content normal.   Nursing note and vitals reviewed.      Procedures           ED Course  ED Course as of Sep 24 1604   Tue Sep 24, 2019   1538 xray unremarkable with exception of the left hand demonstrating second metacarpal fracture will place in splint recommend follow-up with Dr. Michael Carbone  [SC]      ED Course User Index  [SC] Freddy Chowdhury, DEEJAY                  MDM  Number of Diagnoses or Management Options      Amount and/or Complexity of Data Reviewed  Review and summarize past medical records: yes  Discuss the patient with other providers: yes    Risk of Complications, Morbidity, and/or Mortality  Presenting problems: low  Diagnostic procedures: low  Management options: low        Final diagnoses:   Closed displaced fracture of second metacarpal bone of left hand, unspecified portion of metacarpal, initial encounter   Abrasions of multiple sites              Freddy Chowdhury, DEEJAY  09/24/19 [id number]

## 2019-10-14 ENCOUNTER — HOSPITAL ENCOUNTER (OUTPATIENT)
Dept: GENERAL RADIOLOGY | Facility: HOSPITAL | Age: 34
Discharge: HOME | End: 2019-10-14
Admitting: ORTHOPAEDIC SURGERY

## 2019-10-14 ENCOUNTER — TRANSCRIBE ORDERS (OUTPATIENT)
Dept: GENERAL RADIOLOGY | Facility: HOSPITAL | Age: 34
End: 2019-10-14

## 2019-10-14 DIAGNOSIS — S62.351A CLOSED NONDISPLACED FRACTURE OF SHAFT OF SECOND METACARPAL BONE OF LEFT HAND, INITIAL ENCOUNTER: Primary | ICD-10-CM

## 2019-10-14 DIAGNOSIS — S62.351A CLOSED NONDISPLACED FRACTURE OF SHAFT OF SECOND METACARPAL BONE OF LEFT HAND, INITIAL ENCOUNTER: ICD-10-CM

## 2019-10-14 PROCEDURE — 73130 X-RAY EXAM OF HAND: CPT

## 2019-10-15 RX ORDER — TRAZODONE HYDROCHLORIDE 50 MG/1
TABLET ORAL
Qty: 30 TABLET | Refills: 0 | Status: SHIPPED | OUTPATIENT
Start: 2019-10-15 | End: 2019-10-17 | Stop reason: SDUPTHER

## 2019-10-17 RX ORDER — TRAZODONE HYDROCHLORIDE 50 MG/1
TABLET ORAL
Qty: 3 TABLET | Refills: 2 | Status: SHIPPED | OUTPATIENT
Start: 2019-10-17

## 2019-10-23 ENCOUNTER — HOSPITAL ENCOUNTER (OUTPATIENT)
Dept: GENERAL RADIOLOGY | Facility: HOSPITAL | Age: 34
Discharge: HOME | End: 2019-10-23
Admitting: ORTHOPAEDIC SURGERY

## 2019-10-23 ENCOUNTER — TRANSCRIBE ORDERS (OUTPATIENT)
Dept: ADMINISTRATIVE | Facility: HOSPITAL | Age: 34
End: 2019-10-23

## 2019-10-23 DIAGNOSIS — S62.351D: Primary | ICD-10-CM

## 2019-10-23 PROCEDURE — 73130 X-RAY EXAM OF HAND: CPT

## 2020-10-13 ENCOUNTER — HOSPITAL ENCOUNTER (EMERGENCY)
Facility: HOSPITAL | Age: 35
Discharge: HOME | End: 2020-10-13
Attending: EMERGENCY MEDICINE | Admitting: STUDENT IN AN ORGANIZED HEALTH CARE EDUCATION/TRAINING PROGRAM

## 2020-10-13 VITALS
RESPIRATION RATE: 18 BRPM | SYSTOLIC BLOOD PRESSURE: 134 MMHG | BODY MASS INDEX: 27.47 KG/M2 | HEART RATE: 110 BPM | DIASTOLIC BLOOD PRESSURE: 98 MMHG | HEIGHT: 67 IN | TEMPERATURE: 98.5 F | WEIGHT: 175 LBS | OXYGEN SATURATION: 98 %

## 2020-10-13 DIAGNOSIS — J06.9 UPPER RESPIRATORY TRACT INFECTION, UNSPECIFIED TYPE: Primary | ICD-10-CM

## 2020-10-13 LAB
FLUAV AG NPH QL: NEGATIVE
FLUBV AG NPH QL IA: NEGATIVE
SARS-COV-2 RNA PNL SPEC NAA+PROBE: NOT DETECTED

## 2020-10-13 PROCEDURE — 99284 EMERGENCY DEPT VISIT MOD MDM: CPT

## 2020-10-13 PROCEDURE — 87804 INFLUENZA ASSAY W/OPTIC: CPT | Performed by: PHYSICIAN ASSISTANT

## 2020-10-13 PROCEDURE — 63710000001 ONDANSETRON ODT 4 MG TABLET DISPERSIBLE: Performed by: PHYSICIAN ASSISTANT

## 2020-10-13 PROCEDURE — 87635 SARS-COV-2 COVID-19 AMP PRB: CPT | Performed by: PHYSICIAN ASSISTANT

## 2020-10-13 PROCEDURE — C9803 HOPD COVID-19 SPEC COLLECT: HCPCS

## 2020-10-13 RX ORDER — ONDANSETRON 4 MG/1
4 TABLET, FILM COATED ORAL EVERY 6 HOURS PRN
Qty: 12 TABLET | Refills: 0 | Status: SHIPPED | OUTPATIENT
Start: 2020-10-13 | End: 2023-03-16

## 2020-10-13 RX ORDER — ONDANSETRON 4 MG/1
4 TABLET, ORALLY DISINTEGRATING ORAL ONCE
Status: COMPLETED | OUTPATIENT
Start: 2020-10-13 | End: 2020-10-13

## 2020-10-13 RX ORDER — BUTALBITAL, ACETAMINOPHEN AND CAFFEINE 50; 325; 40 MG/1; MG/1; MG/1
1 TABLET ORAL ONCE
Status: COMPLETED | OUTPATIENT
Start: 2020-10-13 | End: 2020-10-13

## 2020-10-13 RX ADMIN — ONDANSETRON 4 MG: 4 TABLET, ORALLY DISINTEGRATING ORAL at 19:32

## 2020-10-13 RX ADMIN — BUTALBITAL, ACETAMINOPHEN AND CAFFEINE 1 TABLET: 50; 325; 40 TABLET ORAL at 19:32

## 2020-10-13 NOTE — ED PROVIDER NOTES
Subjective   35-year-old male presents with headache, nausea, body aches, cough and congestion.  He is concerned about Covid exposure, he was recently at a wedding, his wife has similar symptoms and is also in the ED to be tested for Covid.      History provided by:  Patient   used: No        Review of Systems   Constitutional: Positive for chills.   HENT: Positive for congestion.    Respiratory: Positive for cough.    All other systems reviewed and are negative.      Past Medical History:   Diagnosis Date   • Asthma    • Hypertension    • Scoliosis    • Seizures (CMS/HCC)        Allergies   Allergen Reactions   • Penicillins Unknown (See Comments)     Unknown reaction       Past Surgical History:   Procedure Laterality Date   • COLONOSCOPY     • HERNIA REPAIR     • TESTICLE SURGERY         Family History   Problem Relation Age of Onset   • Arthritis Mother    • Arthritis Father        Social History     Socioeconomic History   • Marital status:      Spouse name: Not on file   • Number of children: Not on file   • Years of education: Not on file   • Highest education level: Not on file   Tobacco Use   • Smoking status: Current Every Day Smoker     Packs/day: 1.00     Types: Cigarettes     Start date: 2/7/2004   • Smokeless tobacco: Never Used   Substance and Sexual Activity   • Alcohol use: Yes     Alcohol/week: 21.0 standard drinks     Types: 21 Cans of beer per week   • Drug use: Not Currently     Types: Marijuana   • Sexual activity: Defer           Objective   Physical Exam  Vitals signs and nursing note reviewed.   Constitutional:       Appearance: He is well-developed.   HENT:      Head: Normocephalic and atraumatic.   Neck:      Musculoskeletal: Normal range of motion.   Cardiovascular:      Rate and Rhythm: Normal rate and regular rhythm.   Pulmonary:      Effort: Pulmonary effort is normal.      Breath sounds: Normal breath sounds.   Abdominal:      Palpations: Abdomen is soft.    Musculoskeletal: Normal range of motion.   Skin:     General: Skin is warm and dry.   Neurological:      Mental Status: He is alert and oriented to person, place, and time.   Psychiatric:         Behavior: Behavior normal.         Procedures           ED Course                                           MDM  Number of Diagnoses or Management Options  Upper respiratory tract infection, unspecified type: new and requires workup     Amount and/or Complexity of Data Reviewed  Clinical lab tests: reviewed    Risk of Complications, Morbidity, and/or Mortality  Presenting problems: minimal  Management options: minimal    Patient Progress  Patient progress: stable      Final diagnoses:   Upper respiratory tract infection, unspecified type            Cristóbal Al Jr., DEEJAY  10/13/20 2021       Cristóbal Al Jr., PA-C  10/13/20 2026

## 2020-10-31 ENCOUNTER — HOSPITAL ENCOUNTER (INPATIENT)
Facility: HOSPITAL | Age: 35
LOS: 2 days | Discharge: HOME | End: 2020-11-03
Attending: EMERGENCY MEDICINE | Admitting: INTERNAL MEDICINE

## 2020-10-31 DIAGNOSIS — F10.920 ALCOHOLIC INTOXICATION WITHOUT COMPLICATION (HCC): ICD-10-CM

## 2020-10-31 DIAGNOSIS — F10.930 ALCOHOL WITHDRAWAL SYNDROME WITHOUT COMPLICATION (HCC): Primary | ICD-10-CM

## 2020-10-31 PROBLEM — F10.939 ALCOHOL WITHDRAWAL (HCC): Status: ACTIVE | Noted: 2020-10-31

## 2020-10-31 LAB
ALBUMIN SERPL-MCNC: 4.3 G/DL (ref 3.5–5.2)
ALBUMIN/GLOB SERPL: 1.6 G/DL
ALP SERPL-CCNC: 82 U/L (ref 39–117)
ALT SERPL W P-5'-P-CCNC: 48 U/L (ref 1–41)
AMPHET+METHAMPHET UR QL: NEGATIVE
AMPHETAMINES UR QL: NEGATIVE
ANION GAP SERPL CALCULATED.3IONS-SCNC: 15 MMOL/L (ref 5–15)
AST SERPL-CCNC: 56 U/L (ref 1–40)
BARBITURATES UR QL SCN: NEGATIVE
BENZODIAZ UR QL SCN: NEGATIVE
BILIRUB SERPL-MCNC: 0.2 MG/DL (ref 0–1.2)
BUN SERPL-MCNC: 4 MG/DL (ref 6–20)
BUN/CREAT SERPL: 7.3 (ref 7–25)
BUPRENORPHINE SERPL-MCNC: NEGATIVE NG/ML
CALCIUM SPEC-SCNC: 8.6 MG/DL (ref 8.6–10.5)
CANNABINOIDS SERPL QL: NEGATIVE
CHLORIDE SERPL-SCNC: 105 MMOL/L (ref 98–107)
CO2 SERPL-SCNC: 21 MMOL/L (ref 22–29)
COCAINE UR QL: NEGATIVE
CREAT SERPL-MCNC: 0.55 MG/DL (ref 0.76–1.27)
ETHANOL BLD-MCNC: 237 MG/DL (ref 0–10)
GFR SERPL CREATININE-BSD FRML MDRD: >150 ML/MIN/1.73
GLOBULIN UR ELPH-MCNC: 2.7 GM/DL
GLUCOSE SERPL-MCNC: 79 MG/DL (ref 65–99)
HOLD SPECIMEN: NORMAL
HOLD SPECIMEN: NORMAL
METHADONE UR QL SCN: NEGATIVE
OPIATES UR QL: NEGATIVE
OXYCODONE UR QL SCN: NEGATIVE
PCP UR QL SCN: NEGATIVE
POTASSIUM SERPL-SCNC: 4.1 MMOL/L (ref 3.5–5.2)
PROPOXYPH UR QL: NEGATIVE
PROT SERPL-MCNC: 7 G/DL (ref 6–8.5)
SODIUM SERPL-SCNC: 141 MMOL/L (ref 136–145)
TRICYCLICS UR QL SCN: NEGATIVE
WHOLE BLOOD HOLD SPECIMEN: NORMAL
WHOLE BLOOD HOLD SPECIMEN: NORMAL

## 2020-10-31 PROCEDURE — 80307 DRUG TEST PRSMV CHEM ANLYZR: CPT | Performed by: EMERGENCY MEDICINE

## 2020-10-31 PROCEDURE — 80053 COMPREHEN METABOLIC PANEL: CPT | Performed by: EMERGENCY MEDICINE

## 2020-10-31 PROCEDURE — 99285 EMERGENCY DEPT VISIT HI MDM: CPT

## 2020-10-31 PROCEDURE — 85025 COMPLETE CBC W/AUTO DIFF WBC: CPT | Performed by: EMERGENCY MEDICINE

## 2020-10-31 PROCEDURE — 99223 1ST HOSP IP/OBS HIGH 75: CPT | Performed by: INTERNAL MEDICINE

## 2020-10-31 RX ORDER — SODIUM CHLORIDE 0.9 % (FLUSH) 0.9 %
10 SYRINGE (ML) INJECTION AS NEEDED
Status: DISCONTINUED | OUTPATIENT
Start: 2020-10-31 | End: 2020-11-03 | Stop reason: HOSPADM

## 2020-11-01 PROBLEM — F10.930 ALCOHOL WITHDRAWAL SYNDROME WITHOUT COMPLICATION: Status: ACTIVE | Noted: 2020-11-01

## 2020-11-01 LAB
ANION GAP SERPL CALCULATED.3IONS-SCNC: 14 MMOL/L (ref 5–15)
BASOPHILS # BLD AUTO: 0.16 10*3/MM3 (ref 0–0.2)
BASOPHILS NFR BLD AUTO: 1.5 % (ref 0–1.5)
BUN SERPL-MCNC: 6 MG/DL (ref 6–20)
BUN/CREAT SERPL: 10.9 (ref 7–25)
CALCIUM SPEC-SCNC: 8.6 MG/DL (ref 8.6–10.5)
CHLORIDE SERPL-SCNC: 103 MMOL/L (ref 98–107)
CO2 SERPL-SCNC: 22 MMOL/L (ref 22–29)
CREAT SERPL-MCNC: 0.55 MG/DL (ref 0.76–1.27)
DEPRECATED RDW RBC AUTO: 54.6 FL (ref 37–54)
DEPRECATED RDW RBC AUTO: 56.3 FL (ref 37–54)
EOSINOPHIL # BLD AUTO: 0.2 10*3/MM3 (ref 0–0.4)
EOSINOPHIL NFR BLD AUTO: 1.9 % (ref 0.3–6.2)
ERYTHROCYTE [DISTWIDTH] IN BLOOD BY AUTOMATED COUNT: 15.3 % (ref 12.3–15.4)
ERYTHROCYTE [DISTWIDTH] IN BLOOD BY AUTOMATED COUNT: 15.5 % (ref 12.3–15.4)
GFR SERPL CREATININE-BSD FRML MDRD: >150 ML/MIN/1.73
GLUCOSE SERPL-MCNC: 70 MG/DL (ref 65–99)
HCT VFR BLD AUTO: 50.2 % (ref 37.5–51)
HCT VFR BLD AUTO: 52.6 % (ref 37.5–51)
HGB BLD-MCNC: 16.6 G/DL (ref 13–17.7)
HGB BLD-MCNC: 17.4 G/DL (ref 13–17.7)
IMM GRANULOCYTES # BLD AUTO: 0.07 10*3/MM3 (ref 0–0.05)
IMM GRANULOCYTES NFR BLD AUTO: 0.7 % (ref 0–0.5)
LYMPHOCYTES # BLD AUTO: 3.28 10*3/MM3 (ref 0.7–3.1)
LYMPHOCYTES NFR BLD AUTO: 30.9 % (ref 19.6–45.3)
MAGNESIUM SERPL-MCNC: 2.1 MG/DL (ref 1.6–2.6)
MCH RBC QN AUTO: 32.1 PG (ref 26.6–33)
MCH RBC QN AUTO: 32.8 PG (ref 26.6–33)
MCHC RBC AUTO-ENTMCNC: 33.1 G/DL (ref 31.5–35.7)
MCHC RBC AUTO-ENTMCNC: 33.1 G/DL (ref 31.5–35.7)
MCV RBC AUTO: 97 FL (ref 79–97)
MCV RBC AUTO: 99.2 FL (ref 79–97)
MONOCYTES # BLD AUTO: 1.25 10*3/MM3 (ref 0.1–0.9)
MONOCYTES NFR BLD AUTO: 11.8 % (ref 5–12)
NEUTROPHILS NFR BLD AUTO: 5.65 10*3/MM3 (ref 1.7–7)
NEUTROPHILS NFR BLD AUTO: 53.2 % (ref 42.7–76)
NRBC BLD AUTO-RTO: 0 /100 WBC (ref 0–0.2)
PLATELET # BLD AUTO: 195 10*3/MM3 (ref 140–450)
PLATELET # BLD AUTO: 218 10*3/MM3 (ref 140–450)
PMV BLD AUTO: 10.9 FL (ref 6–12)
PMV BLD AUTO: 11.5 FL (ref 6–12)
POTASSIUM SERPL-SCNC: 4.6 MMOL/L (ref 3.5–5.2)
RBC # BLD AUTO: 5.06 10*6/MM3 (ref 4.14–5.8)
RBC # BLD AUTO: 5.42 10*6/MM3 (ref 4.14–5.8)
SARS-COV-2 RNA RESP QL NAA+PROBE: NOT DETECTED
SODIUM SERPL-SCNC: 139 MMOL/L (ref 136–145)
TSH SERPL DL<=0.05 MIU/L-ACNC: 1.27 UIU/ML (ref 0.27–4.2)
WBC # BLD AUTO: 10.5 10*3/MM3 (ref 3.4–10.8)
WBC # BLD AUTO: 10.61 10*3/MM3 (ref 3.4–10.8)

## 2020-11-01 PROCEDURE — 25010000002 LORAZEPAM PER 2 MG: Performed by: NURSE PRACTITIONER

## 2020-11-01 PROCEDURE — 25010000002 LORAZEPAM PER 2 MG: Performed by: INTERNAL MEDICINE

## 2020-11-01 PROCEDURE — 99232 SBSQ HOSP IP/OBS MODERATE 35: CPT | Performed by: FAMILY MEDICINE

## 2020-11-01 PROCEDURE — 25010000002 THIAMINE PER 100 MG: Performed by: NURSE PRACTITIONER

## 2020-11-01 PROCEDURE — 80048 BASIC METABOLIC PNL TOTAL CA: CPT | Performed by: NURSE PRACTITIONER

## 2020-11-01 PROCEDURE — 25010000002 ONDANSETRON PER 1 MG: Performed by: NURSE PRACTITIONER

## 2020-11-01 PROCEDURE — 25010000002 ENOXAPARIN PER 10 MG: Performed by: NURSE PRACTITIONER

## 2020-11-01 PROCEDURE — 85027 COMPLETE CBC AUTOMATED: CPT | Performed by: NURSE PRACTITIONER

## 2020-11-01 PROCEDURE — 84443 ASSAY THYROID STIM HORMONE: CPT | Performed by: NURSE PRACTITIONER

## 2020-11-01 PROCEDURE — 87635 SARS-COV-2 COVID-19 AMP PRB: CPT | Performed by: EMERGENCY MEDICINE

## 2020-11-01 PROCEDURE — 83735 ASSAY OF MAGNESIUM: CPT | Performed by: NURSE PRACTITIONER

## 2020-11-01 RX ORDER — LORAZEPAM 1 MG/1
2 TABLET ORAL
Status: DISCONTINUED | OUTPATIENT
Start: 2020-11-01 | End: 2020-11-03 | Stop reason: HOSPADM

## 2020-11-01 RX ORDER — ACETAMINOPHEN 650 MG/1
650 SUPPOSITORY RECTAL EVERY 4 HOURS PRN
Status: DISCONTINUED | OUTPATIENT
Start: 2020-11-01 | End: 2020-11-03 | Stop reason: HOSPADM

## 2020-11-01 RX ORDER — CHLORDIAZEPOXIDE HYDROCHLORIDE 25 MG/1
25 CAPSULE, GELATIN COATED ORAL EVERY 6 HOURS SCHEDULED
Status: DISCONTINUED | OUTPATIENT
Start: 2020-11-01 | End: 2020-11-03

## 2020-11-01 RX ORDER — ONDANSETRON 4 MG/1
4 TABLET, FILM COATED ORAL EVERY 6 HOURS PRN
Status: DISCONTINUED | OUTPATIENT
Start: 2020-11-01 | End: 2020-11-03 | Stop reason: HOSPADM

## 2020-11-01 RX ORDER — ACETAMINOPHEN 160 MG/5ML
650 SOLUTION ORAL EVERY 4 HOURS PRN
Status: DISCONTINUED | OUTPATIENT
Start: 2020-11-01 | End: 2020-11-03 | Stop reason: HOSPADM

## 2020-11-01 RX ORDER — SODIUM CHLORIDE 0.9 % (FLUSH) 0.9 %
10 SYRINGE (ML) INJECTION EVERY 12 HOURS SCHEDULED
Status: DISCONTINUED | OUTPATIENT
Start: 2020-11-01 | End: 2020-11-03 | Stop reason: HOSPADM

## 2020-11-01 RX ORDER — LORAZEPAM 2 MG/ML
2 INJECTION INTRAMUSCULAR
Status: DISCONTINUED | OUTPATIENT
Start: 2020-11-01 | End: 2020-11-03 | Stop reason: HOSPADM

## 2020-11-01 RX ORDER — MULTIPLE VITAMINS W/ MINERALS TAB 9MG-400MCG
1 TAB ORAL DAILY
Status: DISCONTINUED | OUTPATIENT
Start: 2020-11-01 | End: 2020-11-02 | Stop reason: SDUPTHER

## 2020-11-01 RX ORDER — CHLORDIAZEPOXIDE HYDROCHLORIDE 25 MG/1
25 CAPSULE, GELATIN COATED ORAL EVERY 6 HOURS SCHEDULED
Status: DISCONTINUED | OUTPATIENT
Start: 2020-11-01 | End: 2020-11-01

## 2020-11-01 RX ORDER — ONDANSETRON 2 MG/ML
4 INJECTION INTRAMUSCULAR; INTRAVENOUS EVERY 6 HOURS PRN
Status: DISCONTINUED | OUTPATIENT
Start: 2020-11-01 | End: 2020-11-03 | Stop reason: HOSPADM

## 2020-11-01 RX ORDER — LORAZEPAM 2 MG/ML
1 INJECTION INTRAMUSCULAR
Status: DISCONTINUED | OUTPATIENT
Start: 2020-11-01 | End: 2020-11-03 | Stop reason: HOSPADM

## 2020-11-01 RX ORDER — ACETAMINOPHEN 325 MG/1
650 TABLET ORAL EVERY 4 HOURS PRN
Status: DISCONTINUED | OUTPATIENT
Start: 2020-11-01 | End: 2020-11-03 | Stop reason: HOSPADM

## 2020-11-01 RX ORDER — CARVEDILOL 3.12 MG/1
6.25 TABLET ORAL 2 TIMES DAILY WITH MEALS
Status: DISCONTINUED | OUTPATIENT
Start: 2020-11-01 | End: 2020-11-03 | Stop reason: HOSPADM

## 2020-11-01 RX ORDER — SODIUM CHLORIDE 0.9 % (FLUSH) 0.9 %
10 SYRINGE (ML) INJECTION AS NEEDED
Status: DISCONTINUED | OUTPATIENT
Start: 2020-11-01 | End: 2020-11-03 | Stop reason: HOSPADM

## 2020-11-01 RX ORDER — NICOTINE 21 MG/24HR
1 PATCH, TRANSDERMAL 24 HOURS TRANSDERMAL
Status: DISCONTINUED | OUTPATIENT
Start: 2020-11-01 | End: 2020-11-03 | Stop reason: HOSPADM

## 2020-11-01 RX ORDER — LORAZEPAM 1 MG/1
1 TABLET ORAL
Status: DISCONTINUED | OUTPATIENT
Start: 2020-11-01 | End: 2020-11-03 | Stop reason: HOSPADM

## 2020-11-01 RX ORDER — ALBUTEROL SULFATE 2.5 MG/3ML
2.5 SOLUTION RESPIRATORY (INHALATION) EVERY 4 HOURS PRN
Status: DISCONTINUED | OUTPATIENT
Start: 2020-11-01 | End: 2020-11-03 | Stop reason: HOSPADM

## 2020-11-01 RX ORDER — LORAZEPAM 2 MG/ML
1.5 INJECTION INTRAMUSCULAR ONCE
Status: COMPLETED | OUTPATIENT
Start: 2020-11-01 | End: 2020-11-01

## 2020-11-01 RX ADMIN — CARVEDILOL 6.25 MG: 3.12 TABLET, FILM COATED ORAL at 09:22

## 2020-11-01 RX ADMIN — NICOTINE POLACRILEX 2 MG: 2 GUM, CHEWING BUCCAL at 17:21

## 2020-11-01 RX ADMIN — SODIUM CHLORIDE, PRESERVATIVE FREE 10 ML: 5 INJECTION INTRAVENOUS at 09:23

## 2020-11-01 RX ADMIN — MULTIPLE VITAMINS W/ MINERALS TAB 1 TABLET: TAB at 09:22

## 2020-11-01 RX ADMIN — LORAZEPAM 2 MG: 2 INJECTION INTRAMUSCULAR; INTRAVENOUS at 21:27

## 2020-11-01 RX ADMIN — Medication 1 PATCH: at 02:45

## 2020-11-01 RX ADMIN — NICOTINE POLACRILEX 2 MG: 2 GUM, CHEWING BUCCAL at 23:47

## 2020-11-01 RX ADMIN — ONDANSETRON 4 MG: 2 INJECTION INTRAMUSCULAR; INTRAVENOUS at 17:27

## 2020-11-01 RX ADMIN — CHLORDIAZEPOXIDE HYDROCHLORIDE 25 MG: 25 CAPSULE ORAL at 17:20

## 2020-11-01 RX ADMIN — LORAZEPAM 1.5 MG: 2 INJECTION INTRAMUSCULAR; INTRAVENOUS at 05:11

## 2020-11-01 RX ADMIN — CHLORDIAZEPOXIDE HYDROCHLORIDE 25 MG: 25 CAPSULE ORAL at 09:22

## 2020-11-01 RX ADMIN — NICOTINE POLACRILEX 2 MG: 2 GUM, CHEWING BUCCAL at 18:46

## 2020-11-01 RX ADMIN — FOLIC ACID 100 ML/HR: 5 INJECTION, SOLUTION INTRAMUSCULAR; INTRAVENOUS; SUBCUTANEOUS at 09:23

## 2020-11-01 RX ADMIN — LORAZEPAM 2 MG: 2 INJECTION INTRAMUSCULAR; INTRAVENOUS at 07:34

## 2020-11-01 RX ADMIN — CARVEDILOL 6.25 MG: 3.12 TABLET, FILM COATED ORAL at 17:20

## 2020-11-01 RX ADMIN — ONDANSETRON 4 MG: 2 INJECTION INTRAMUSCULAR; INTRAVENOUS at 07:34

## 2020-11-01 RX ADMIN — LORAZEPAM 2 MG: 2 INJECTION INTRAMUSCULAR; INTRAVENOUS at 12:07

## 2020-11-01 RX ADMIN — CHLORDIAZEPOXIDE HYDROCHLORIDE 25 MG: 25 CAPSULE ORAL at 02:30

## 2020-11-01 RX ADMIN — LORAZEPAM 2 MG: 2 INJECTION INTRAMUSCULAR; INTRAVENOUS at 23:38

## 2020-11-01 RX ADMIN — ENOXAPARIN SODIUM 40 MG: 40 INJECTION SUBCUTANEOUS at 07:22

## 2020-11-01 RX ADMIN — LORAZEPAM 2 MG: 2 INJECTION INTRAMUSCULAR; INTRAVENOUS at 14:51

## 2020-11-01 RX ADMIN — SODIUM CHLORIDE, PRESERVATIVE FREE 10 ML: 5 INJECTION INTRAVENOUS at 21:26

## 2020-11-01 RX ADMIN — LORAZEPAM 2 MG: 2 INJECTION INTRAMUSCULAR; INTRAVENOUS at 17:27

## 2020-11-01 RX ADMIN — SODIUM CHLORIDE, PRESERVATIVE FREE 10 ML: 5 INJECTION INTRAVENOUS at 05:15

## 2020-11-01 RX ADMIN — CHLORDIAZEPOXIDE HYDROCHLORIDE 25 MG: 25 CAPSULE ORAL at 23:38

## 2020-11-01 NOTE — ED PROVIDER NOTES
EMERGENCY DEPARTMENT ENCOUNTER      Pt Name: Dung Knight  MRN: [id number]  YOB: 1985  Date of evaluation: 10/31/2020  Provider: Luis Muro MD    CHIEF COMPLAINT       Chief Complaint   Patient presents with   • Addiction Problem         HISTORY OF PRESENT ILLNESS  (Location/Symptom, Timing/Onset, Context/Setting, Quality, Duration, Modifying Factors, Severity.)   Dung Knight is a 35 y.o. male who presents to the emergency department for assistance with alcohol withdrawal symptoms.  Patient sent over from the [facility].  He has history of alcohol withdrawal seizures in the past and typically drinks more than 20 beers per day.  He states that he has cut down to 6 beers per day and last drank earlier this afternoon.  He is feeling anxious at this time but denies any other associated symptoms.      Nursing notes were reviewed.    REVIEW OF SYSTEMS    (2-9 systems for level 4, 10 or more for level 5)   ROS:  General:  No fevers, no chills, no weakness  Cardiovascular:  No chest pain, no palpitations  Respiratory:  No shortness of breath, no cough, no wheezing  Gastrointestinal:  No pain, no nausea, no vomiting, no diarrhea  Musculoskeletal:  No muscle pain, no joint pain  Skin:  No rash, no easy bruising  Neurologic:  No speech problems, no headache, no extremity numbness, no extremity tingling, no extremity weakness  Psychiatric:  No anxiety  Genitourinary:  No dysuria, no hematuria    Except as noted above the remainder of the review of systems was reviewed and negative.       PAST MEDICAL HISTORY     Past Medical History:   Diagnosis Date   • Asthma    • Hypertension    • Scoliosis    • Seizures (CMS/HCC)          SURGICAL HISTORY       Past Surgical History:   Procedure Laterality Date   • COLONOSCOPY     • HERNIA REPAIR     • TESTICLE SURGERY           CURRENT MEDICATIONS       Current Facility-Administered Medications:   •  acetaminophen (TYLENOL) tablet 650 mg, 650 mg, Oral, Q4H PRN **OR**  acetaminophen (TYLENOL) 160 MG/5ML solution 650 mg, 650 mg, Oral, Q4H PRN **OR** acetaminophen (TYLENOL) suppository 650 mg, 650 mg, Rectal, Q4H PRN, Ruma Sanchez APRN  •  albuterol (PROVENTIL) nebulizer solution 0.083% 2.5 mg/3mL, 2.5 mg, Nebulization, Q4H PRN, Ruma Sanchez APRN  •  carvedilol (COREG) tablet 6.25 mg, 6.25 mg, Oral, BID With Meals, Ruma Sanchez APRN  •  chlordiazePOXIDE (LIBRIUM) capsule 25 mg, 25 mg, Oral, Q6H, Tee Eller MD  •  enoxaparin (LOVENOX) syringe 40 mg, 40 mg, Subcutaneous, Q AM, Ruma Sanchez APRN, 40 mg at 11/01/20 0722  •  LORazepam (ATIVAN) tablet 1 mg, 1 mg, Oral, Q2H PRN **OR** LORazepam (ATIVAN) injection 1 mg, 1 mg, Intravenous, Q2H PRN **OR** LORazepam (ATIVAN) tablet 2 mg, 2 mg, Oral, Q1H PRN **OR** LORazepam (ATIVAN) injection 2 mg, 2 mg, Intravenous, Q1H PRN, 2 mg at 11/01/20 0734 **OR** LORazepam (ATIVAN) injection 2 mg, 2 mg, Intravenous, Q15 Min PRN **OR** LORazepam (ATIVAN) injection 2 mg, 2 mg, Intramuscular, Q15 Min PRN, Ruma Sanchez APRN  •  multiple vitamin (M.V.I. Adult) 10 mL, thiamine (B-1) 100 mg, folic acid 1 mg in sodium chloride 0.9 % 1,000 mL infusion, 100 mL/hr, Intravenous, Daily, Ruma Sanchez APRN  •  multivitamin with minerals 1 tablet, 1 tablet, Oral, Daily, Ruma Sanchez APRN  •  nicotine (NICODERM CQ) 21 MG/24HR patch 1 patch, 1 patch, Transdermal, Q AM, Ruma Sanchez APRN, 1 patch at 11/01/20 0245  •  ondansetron (ZOFRAN) tablet 4 mg, 4 mg, Oral, Q6H PRN **OR** ondansetron (ZOFRAN) injection 4 mg, 4 mg, Intravenous, Q6H PRN, Ruma Sanchez APRN, 4 mg at 11/01/20 0734  •  sodium chloride 0.9 % flush 10 mL, 10 mL, Intravenous, PRN, Luis Muro MD  •  sodium chloride 0.9 % flush 10 mL, 10 mL, Intravenous, Q12H, Ruma Sanchez APRN, 10 mL at 11/01/20 0515  •  sodium chloride 0.9 % flush 10 mL, 10 mL, Intravenous, PRN, Ruma Sanchez APRN    ALLERGIES      Penicillins    FAMILY HISTORY       Family History   Problem Relation Age of Onset   • Arthritis Mother    • Arthritis Father           SOCIAL HISTORY       Social History     Socioeconomic History   • Marital status:      Spouse name: Not on file   • Number of children: Not on file   • Years of education: Not on file   • Highest education level: Not on file   Tobacco Use   • Smoking status: Current Every Day Smoker     Packs/day: 1.00     Types: Cigarettes     Start date: 2/7/2004   • Smokeless tobacco: Never Used   Substance and Sexual Activity   • Alcohol use: Yes     Alcohol/week: 21.0 standard drinks     Types: 21 Cans of beer per week   • Drug use: Not Currently     Types: Marijuana   • Sexual activity: Defer         PHYSICAL EXAM    (up to 7 for level 4, 8 or more for level 5)     Vitals:    11/01/20 0022 11/01/20 0030 11/01/20 0119 11/01/20 0312   BP:  106/73 128/88 127/82   BP Location:   Left arm Left arm   Patient Position:   Lying Lying   Pulse:    95   Resp:    18   Temp:   98 °F (36.7 °C) 98.7 °F (37.1 °C)   TempSrc:   Oral Oral   SpO2: 97%   92%   Weight:   76.6 kg (168 lb 12.8 oz)    Height:           Physical Exam  General: Awake, alert, no acute distress.  HEENT: Conjunctiva normal.  Neck: Trachea midline.  Cardiac: Heart regular rate, rhythm, no murmurs, rubs, or gallops  Lungs: Lungs are clear to auscultation, there is no wheezing, rhonchi, or rales. There is no use of accessory muscles.  Chest wall: There is no tenderness to palpation over the chest wall or over ribs  Abdomen: Abdomen is soft, nontender, nondistended. There are no firm or pulsatile masses, no rebound rigidity or guarding.   Musculoskeletal: No deformity.  Neuro: Alert and oriented x 4.  Dermatology: Skin is warm and dry  Psych: Mentation is grossly normal, cognition is grossly normal. Affect is appropriate.        DIAGNOSTIC RESULTS     LABS:    I have reviewed and interpreted all of the currently available lab results  from this visit (if applicable):  Results for orders placed or performed during the hospital encounter of 10/31/20   COVID-19,CEPHEID,STEFAN IN-HOUSE(OR EMERGENT/ADD-ON),NP SWAB IN TRANSPORT MEDIA 3-4 HR TAT - Swab, Nasopharynx    Specimen: Nasopharynx; Swab   Result Value Ref Range    COVID19 Not Detected Not Detected - Ref. Range   Comprehensive Metabolic Panel    Specimen: Blood   Result Value Ref Range    Glucose 79 65 - 99 mg/dL    BUN 4 (L) 6 - 20 mg/dL    Creatinine 0.55 (L) 0.76 - 1.27 mg/dL    Sodium 141 136 - 145 mmol/L    Potassium 4.1 3.5 - 5.2 mmol/L    Chloride 105 98 - 107 mmol/L    CO2 21.0 (L) 22.0 - 29.0 mmol/L    Calcium 8.6 8.6 - 10.5 mg/dL    Total Protein 7.0 6.0 - 8.5 g/dL    Albumin 4.30 3.50 - 5.20 g/dL    ALT (SGPT) 48 (H) 1 - 41 U/L    AST (SGOT) 56 (H) 1 - 40 U/L    Alkaline Phosphatase 82 39 - 117 U/L    Total Bilirubin 0.2 0.0 - 1.2 mg/dL    eGFR Non African Amer >150 >60 mL/min/1.73    Globulin 2.7 gm/dL    A/G Ratio 1.6 g/dL    BUN/Creatinine Ratio 7.3 7.0 - 25.0    Anion Gap 15.0 5.0 - 15.0 mmol/L   Urine Drug Screen - Urine, Clean Catch    Specimen: Urine, Clean Catch   Result Value Ref Range    THC, Screen, Urine Negative Negative    Phencyclidine (PCP), Urine Negative Negative    Cocaine Screen, Urine Negative Negative    Methamphetamine, Ur Negative Negative    Opiate Screen Negative Negative    Amphetamine Screen, Urine Negative Negative    Benzodiazepine Screen, Urine Negative Negative    Tricyclic Antidepressants Screen Negative Negative    Methadone Screen, Urine Negative Negative    Barbiturates Screen, Urine Negative Negative    Oxycodone Screen, Urine Negative Negative    Propoxyphene Screen Negative Negative    Buprenorphine, Screen, Urine Negative Negative   Ethanol    Specimen: Blood   Result Value Ref Range    Ethanol 237 (H) 0 - 10 mg/dL   CBC Auto Differential    Specimen: Blood   Result Value Ref Range    WBC 10.61 3.40 - 10.80 10*3/mm3    RBC 5.42 4.14 - 5.80 10*6/mm3     Hemoglobin 17.4 13.0 - 17.7 g/dL    Hematocrit 52.6 (H) 37.5 - 51.0 %    MCV 97.0 79.0 - 97.0 fL    MCH 32.1 26.6 - 33.0 pg    MCHC 33.1 31.5 - 35.7 g/dL    RDW 15.5 (H) 12.3 - 15.4 %    RDW-SD 54.6 (H) 37.0 - 54.0 fl    MPV 11.5 6.0 - 12.0 fL    Platelets 218 140 - 450 10*3/mm3    Neutrophil % 53.2 42.7 - 76.0 %    Lymphocyte % 30.9 19.6 - 45.3 %    Monocyte % 11.8 5.0 - 12.0 %    Eosinophil % 1.9 0.3 - 6.2 %    Basophil % 1.5 0.0 - 1.5 %    Immature Grans % 0.7 (H) 0.0 - 0.5 %    Neutrophils, Absolute 5.65 1.70 - 7.00 10*3/mm3    Lymphocytes, Absolute 3.28 (H) 0.70 - 3.10 10*3/mm3    Monocytes, Absolute 1.25 (H) 0.10 - 0.90 10*3/mm3    Eosinophils, Absolute 0.20 0.00 - 0.40 10*3/mm3    Basophils, Absolute 0.16 0.00 - 0.20 10*3/mm3    Immature Grans, Absolute 0.07 (H) 0.00 - 0.05 10*3/mm3    nRBC 0.0 0.0 - 0.2 /100 WBC   Light Blue Top   Result Value Ref Range    Extra Tube hold for add-on    Green Top (Gel)   Result Value Ref Range    Extra Tube Hold for add-ons.    Lavender Top   Result Value Ref Range    Extra Tube hold for add-on    Gold Top - SST   Result Value Ref Range    Extra Tube Hold for add-ons.         All other labs were within normal range or not returned as of this dictation.      EMERGENCY DEPARTMENT COURSE and DIFFERENTIAL DIAGNOSIS/MDM:   Vitals:    Vitals:    11/01/20 0022 11/01/20 0030 11/01/20 0119 11/01/20 0312   BP:  106/73 128/88 127/82   BP Location:   Left arm Left arm   Patient Position:   Lying Lying   Pulse:    95   Resp:    18   Temp:   98 °F (36.7 °C) 98.7 °F (37.1 °C)   TempSrc:   Oral Oral   SpO2: 97%   92%   Weight:   76.6 kg (168 lb 12.8 oz)    Height:           ED Course as of Nov 01 0746   Sat Oct 31, 2020   2350 Spoke w/ Dr. Haywood who accepts the pt for admission.    [NS]      ED Course User Index  [NS] Luis Muro MD       Patient presents for assistance with alcohol cessation/withdrawal.  Alcohol level elevated in the ED but no overt signs of withdrawal.  Given  his clinical history, particularly history of withdrawal seizures, will admit for detox prior to sending to inpatient rehab facility.    I had a discussion with the patient/family regarding diagnosis, diagnostic results, treatment plan, and medications.  The patient/family indicated understanding of these instructions.  I spent adequate time at the bedside proceeding discharge necessary to personally discuss the aftercare instructions, giving patient education, providing explanations of the results of our evaluations/findings, and my decision making to assure that the patient/family understand the plan of care.  Time was allotted to answer questions at that time and throughout the ED course.  Emphasis was placed on timely follow-up after discharge.  I also discussed the potential for the development of an acute emergent condition requiring further evaluation, admission, or even surgical intervention. I discussed that we found nothing during the visit today indicating the need for further workup, admission, or the presence of an unstable medical condition.  I encouraged the patient to return to the emergency department immediately for ANY concerns, worsening, new complaints, or if symptoms persist and unable to seek follow-up in a timely fashion.  The patient/family expressed understanding and agreement with this plan.  The patient will follow-up with their PCP in 1-2 days for reevaluation.           FINAL IMPRESSION      1. Alcohol withdrawal syndrome without complication (CMS/HCC)    2. Alcoholic intoxication without complication (CMS/HCC)          DISPOSITION/PLAN     ED Disposition     ED Disposition Condition Comment    Decision to Admit  Level of Care: Telemetry [5]   Diagnosis: Alcohol withdrawal syndrome without complication (CMS/HCC) [[id number]]   Admitting Physician: KEN MAZARIEGOS [[id number]]   Attending Physician: KEN MAZARIEGOS [[id number]]   Bed Request Comments: donal Muro MD  Attending  Emergency Physician               Luis Muro MD  11/01/20 07

## 2020-11-01 NOTE — H&P
[facility] Medicine Services  HISTORY AND PHYSICAL    Patient Name: Dung Knight  : 1985  MRN: [id number]  Primary Care Physician: Han Reyse APRN  Date of admission: 10/31/2020      Subjective   Subjective     Chief Complaint:  Alcohol abuse    HPI:  Dung Knight is a 35 y.o. male with a history of HTN, asthma, seizures, alcohol abuse presents to the ED with complaints of alcohol abuse.  Patient reports that he has been drinking 24 tall boys daily for several months.  His last drink was around 4 PM today.  He is interested in getting help to stop drinking.  He did go to the [facility] earlier today and was referred to the ED due to history of seizures during withdrawal.  He denies any fevers, shortness of air, chest pain, nausea, vomiting, tremors, abdominal pain, or any other complaints at this time.  He states that he usually works approximately 12 hours a day at [employer] and does not start having withdrawal until after going home from work.  He has been to inpatient rehab approximately 7 years ago.  Ethanol level was 237 upon arrival.  UDS negative.  Patient is being admitted to the hospital medicine service for further evaluation and management.      Current COVID Risks are:  [] Fever []  Cough [] Shortness of breath [] Fatigue [] Change in taste or smell    [] Exposure to COVID positive patient  [] High risk facility   [x]  NONE    Review of Systems   Constitutional: Negative.    HENT: Negative.    Eyes: Negative.    Respiratory: Negative.    Cardiovascular: Negative.    Gastrointestinal: Negative.    Endocrine: Negative.    Genitourinary: Negative.    Musculoskeletal: Negative.    Skin: Negative.    Allergic/Immunologic: Negative.    Neurological: Negative.    Hematological: Negative.    Psychiatric/Behavioral: Negative.           Personal History     Past Medical History:   Diagnosis Date   • Asthma    • Hypertension    • Scoliosis    • Seizures (CMS/HCC)         Past Surgical History:   Procedure Laterality Date   • COLONOSCOPY     • HERNIA REPAIR     • TESTICLE SURGERY         Family History: family history includes Arthritis in his father and mother. Otherwise pertinent FHx was reviewed and unremarkable.     Social History:  reports that he has been smoking cigarettes. He started smoking about 16 years ago. He has been smoking about 1.00 pack per day. He has never used smokeless tobacco. He reports current alcohol use of about 21.0 standard drinks of alcohol per week. He reports previous drug use. Drug: Marijuana.  Social History     Social History Narrative   • Not on file       Medications:  Available home medication information reviewed.  (Not in a hospital admission)      Allergies   Allergen Reactions   • Penicillins Unknown (See Comments)     Unknown reaction       Objective   Objective     Vital Signs:   Temp:  [99.6 °F (37.6 °C)] 99.6 °F (37.6 °C)  Heart Rate:  [111] 111  Resp:  [18] 18  BP: ()/(59-84) 102/62        Physical Exam  Constitutional:       General: He is not in acute distress.  HENT:      Head: Normocephalic.      Nose: Nose normal.      Mouth/Throat:      Mouth: Mucous membranes are moist.   Eyes:      Pupils: Pupils are equal, round, and reactive to light.   Neck:      Musculoskeletal: Normal range of motion.   Cardiovascular:      Rate and Rhythm: Regular rhythm. Tachycardia present.      Pulses: Normal pulses.      Heart sounds: Normal heart sounds. No murmur. No friction rub. No gallop.    Pulmonary:      Effort: Pulmonary effort is normal. No respiratory distress.      Breath sounds: Normal breath sounds. No wheezing, rhonchi or rales.   Abdominal:      General: Bowel sounds are normal. There is no distension.      Palpations: Abdomen is soft.      Tenderness: There is no abdominal tenderness. There is no guarding or rebound.   Musculoskeletal: Normal range of motion.         General: No swelling, tenderness or signs of injury.    Skin:     General: Skin is warm and dry.      Coloration: Skin is not pale.      Findings: No erythema or rash.      Comments: 2 scratch marks on left shin from dog   Neurological:      General: No focal deficit present.      Mental Status: He is alert and oriented to person, place, and time.      Cranial Nerves: No cranial nerve deficit.      Sensory: No sensory deficit.   Psychiatric:         Mood and Affect: Mood normal.         Behavior: Behavior normal.         Thought Content: Thought content normal.            Results Reviewed:  I have personally reviewed most recent indicated data and agree with findings including:  [x]  Laboratory  [x]  Radiology  [x]  EKG/Telemetry  []  Pathology  []  Cardiac/Vascular Studies  [x]  Old records  []  Other:  Most pertinent findings include:          Results from last 7 days   Lab Units 10/31/20  2147   SODIUM mmol/L 141   POTASSIUM mmol/L 4.1   CHLORIDE mmol/L 105   CO2 mmol/L 21.0*   BUN mg/dL 4*   CREATININE mg/dL 0.55*   GLUCOSE mg/dL 79   CALCIUM mg/dL 8.6   ALT (SGPT) U/L 48*   AST (SGOT) U/L 56*     Estimated Creatinine Clearance: 206.4 mL/min (A) (by C-G formula based on SCr of 0.55 mg/dL (L)).  Brief Urine Lab Results     None        Imaging Results (Last 24 Hours)     ** No results found for the last 24 hours. **             Assessment/Plan   Assessment & Plan     Active Hospital Problems    Diagnosis POA   • **Alcohol withdrawal (CMS/HCC) [F10.239] Yes   • Alcohol withdrawal syndrome without complication (CMS/HCC) [F10.230] Yes   • Tobacco use disorder [F17.200] Yes   • Essential hypertension [I10] Yes   • Elevated liver enzymes [R74.8] Yes   • Seizure (CMS/HCC) [R56.9] Yes     Dung Knight is a 35 y.o. male with a history of HTN, asthma, seizures, alcohol abuse presents to the ED with complaints of alcohol abuse.  Patient reports that he has been drinking 24 tall boys daily for several months.  His last drink was around 4 PM today.      Assessment and  Plan:    Alcohol withdrawal  History of seizure  --ethanol level 237, UDS negative  --[facility] protocol  --Librium 25 mg Q 6 hours  --prn ativan  --seizure precautions  --consult case management for discharge planning  --Rally pack  --am labs    Elevated transaminases  --IV fluids  --monitor    HTN  --coreg    Tobacco abuse  --smoking cessation education  --nicotine patch        DVT prophylaxis:  Lovenox      CODE STATUS:    Code Status and Medical Interventions:   Ordered at: 11/01/20 0004     Level Of Support Discussed With:    Patient     Code Status:    CPR     Medical Interventions (Level of Support Prior to Arrest):    Full       Electronically signed by Ruma Sanchez, BLAINE, 10/31/20, 11:53 PM EDT.    Brief Attending Admission Attestation     I have seen and examined the patient, performing an independent face-to-face diagnostic evaluation with plan of care reviewed and developed with the advanced practice clinician (APC).         Vitals:    11/01/20 0030   BP: 106/73   Pulse:  111   Resp:  18-97% on room air   Temp:  99.6   SpO2:        Attending Physical Exam:  RS- CTA-BL, ,  No wheezing , no crackles, good effort.  CVS- s1s2 regular, no murmur.  ABD- soft, non tender, not distended, no organomegaly.  EXT- no edema.  NEURO- pt is sleepy, will respond to verbal stimuli, but fall back to sleep immediately.    Old records reviewed and summarized in PM hx.    Brief Summary Statement:  35-year-old male, with history of significant alcohol use-last drink being at 4 PM, presented to ED for evaluation of possible rehab placement.  Initial alcohol level was 237.      In ED patient got Ativan 1.5 mg  Remainder of detailed HPI is as noted above and has been reviewed and/or edited by me for completeness.    PM HX:  Mood disorder/anxiety -trazodone 50 mg p.o. at bedtime, Valium 2 mg p.o. daily  Hypertension-Coreg 6.25 every 12  History of alcohol abuse, alcohol-withdrawal seizure -on folic acid 1 mg p.o. daily,  Hx  asthma      LABS:  Blood glucose-79  BUN/creatinine 4/0.5, ALT 48, AST 56, total bili 0.2  WBC 10, hemoglobin 17, platelets 218  EKG/chest x-ray not done.  Covid testing pending.    Brief Assessment/Plan  Case Mx consult for Rehab [facility],  [facility] protocol for now.    See above for further detailed assessment and plan developed with APC which I have reviewed and/or edited for completeness.  Admission Status: I believe that this patient meets inpt criteria, due to need for monitoring of Alcohol withdrawal sxs.

## 2020-11-01 NOTE — PROGRESS NOTES
[facility] Medicine Services  PROGRESS NOTE    Patient Name: Dung Knight  : 1985  MRN: [id number]    Date of Admission: 10/31/2020  Primary Care Physician: Han Reyes APRN    Subjective   Subjective     CC:  F/U alcohol withdrawal     HPI:  Pt seen and examined. RN notes reviewed. No acute events overnight. Pt states he is anxious but just got some ativan. States he has had alcohol withdrawal before.     Review of Systems  Gen- No fevers, chills  CV- No chest pain, palpitations  Resp- No cough, dyspnea  GI- No N/V/D, abd pain    Objective   Objective     Vital Signs:   Temp:  [98 °F (36.7 °C)-99.6 °F (37.6 °C)] 98.4 °F (36.9 °C)  Heart Rate:  [] 98  Resp:  [16-18] 16  BP: ()/(59-88) 118/69        Physical Exam:  Constitutional: No acute distress, awake, alert, lying in bed  HENT: NCAT, mucous membranes moist  Respiratory: Clear to auscultation bilaterally, respiratory effort normal   Cardiovascular: RRR, no murmurs, rubs, or gallops, palpable pedal pulses bilaterally  Gastrointestinal: Positive bowel sounds, soft, nontender, nondistended  Musculoskeletal: No bilateral ankle edema  Psychiatric: Appropriate affect, cooperative  Neurologic: Oriented x 3, strength symmetric in all extremities, Cranial Nerves grossly intact to confrontation, speech clear  Skin: No rashes    Results Reviewed:  Results from last 7 days   Lab Units 10/31/20  2147   WBC 10*3/mm3 10.61   HEMOGLOBIN g/dL 17.4   HEMATOCRIT % 52.6*   PLATELETS 10*3/mm3 218     Results from last 7 days   Lab Units 10/31/20  2147   SODIUM mmol/L 141   POTASSIUM mmol/L 4.1   CHLORIDE mmol/L 105   CO2 mmol/L 21.0*   BUN mg/dL 4*   CREATININE mg/dL 0.55*   GLUCOSE mg/dL 79   CALCIUM mg/dL 8.6   ALT (SGPT) U/L 48*   AST (SGOT) U/L 56*     Estimated Creatinine Clearance: 205 mL/min (A) (by C-G formula based on SCr of 0.55 mg/dL (L)).    Microbiology Results Abnormal     Procedure Component Value - Date/Time     COVID-19,CEPHEID,STEFAN IN-HOUSE(OR EMERGENT/ADD-ON),NP SWAB IN TRANSPORT MEDIA 3-4 HR TAT - Swab, Nasopharynx [[id number]]  (Normal) Collected: 11/01/20 0106    Lab Status: Final result Specimen: Swab from Nasopharynx Updated: 11/01/20 0418     COVID19 Not Detected    Narrative:      Fact sheet for providers: https://www.fda.gov/media/294743/download     Fact sheet for patients: https://www.fda.gov/media/209226/download          Imaging Results (Last 24 Hours)     ** No results found for the last 24 hours. **               I have reviewed the medications:  Scheduled Meds:carvedilol, 6.25 mg, Oral, BID With Meals  chlordiazePOXIDE, 25 mg, Oral, Q6H  enoxaparin, 40 mg, Subcutaneous, Q AM  IV Fluids 1000 mL + additives, 100 mL/hr, Intravenous, Daily  multivitamin with minerals, 1 tablet, Oral, Daily  nicotine, 1 patch, Transdermal, Q AM  sodium chloride, 10 mL, Intravenous, Q12H      Continuous Infusions:   PRN Meds:.•  acetaminophen **OR** acetaminophen **OR** acetaminophen  •  albuterol  •  LORazepam **OR** LORazepam **OR** LORazepam **OR** LORazepam **OR** LORazepam **OR** LORazepam  •  ondansetron **OR** ondansetron  •  sodium chloride  •  sodium chloride    Assessment/Plan   Assessment & Plan     Active Hospital Problems    Diagnosis  POA   • **Alcohol withdrawal (CMS/HCC) [F10.239]  Yes   • Alcohol withdrawal syndrome without complication (CMS/HCC) [F10.230]  Yes   • Tobacco use disorder [F17.200]  Yes   • Essential hypertension [I10]  Yes   • Elevated liver enzymes [R74.8]  Yes   • Seizure (CMS/HCC) [R56.9]  Yes      Resolved Hospital Problems   No resolved problems to display.        Brief Hospital Course to date:  Dung Knight is a 35 y.o. male with a history of HTN, asthma, seizures, alcohol abuse presents to the ED with complaints of alcohol abuse.  Patient reports that he has been drinking 24 tall boys daily for several months.  His last drink was around 4 PM on 10/31.    This patient's problems and plans were  partially entered by my partner and updated as appropriate by me 11/01/20.  All problems are new to me today     Alcohol withdrawal  History of seizure  --CIWA protocol  --Librium 25 mg Q 6 hours  --prn ativan  --seizure precautions  --case management for discharge planning  --Consult in AM to Addiction team   --Rally pack, start PO thiamine/folic acid tomorrow  --Continue Multivitamin      Elevated transaminases  --Repeat CMP in AM  --If further elevated, may need US     HTN  --Continue Coreg     Tobacco abuse  --smoking cessation education provided   --continue nicotine patch    DVT Prophylaxis:  Lovenox    Disposition: I expect the patient to be discharged TBD    CODE STATUS:   Code Status and Medical Interventions:   Ordered at: 11/01/20 0004     Level Of Support Discussed With:    Patient     Code Status:    CPR     Medical Interventions (Level of Support Prior to Arrest):    Full       Renetta Tay, DO  11/01/20

## 2020-11-01 NOTE — PAYOR COMM NOTE
"Donna Matos RN  Utilization Review  P: [phone]  F: [phone]    Ref # [id number]  Initial clinicals for inpatient auth    Chandler King (35 y.o. Male)     Date of Birth Social Security Number Address Home Phone MRN    1985  [address] [address] [phone] [id number]    [place] Marital Status          None        Admission Date Admission Type Admitting Provider Attending Provider Department, Room/Bed    10/31/20 Emergency Renetta Tay DO Hamilton, Olivia D, DO [facility] 3F, S312/1    Discharge Date Discharge Disposition Discharge Destination                       Attending Provider: Renetta Tay DO    Allergies: Penicillins    Isolation: None   Infection: None   Code Status: CPR    Ht: 170.2 cm (67\")   Wt: 76.6 kg (168 lb 12.8 oz)    Admission Cmt: None   Principal Problem: Alcohol withdrawal (CMS/LTAC, located within [facility]) [F10.239]                 Active Insurance as of 10/31/2020     Primary Coverage     Payor Plan Insurance Group Employer/Plan Group    [place] [id number]     Payor Plan Address Payor Plan Phone Number Payor Plan Fax Number Effective Dates    [employer] Claims Office - [address] [address] [phone]  2020 - None Entered    [facility] [address]       Subscriber Name Subscriber Birth Date Member ID       CHANDLER KING 1985 [id number]                 Emergency Contacts      (Rel.) Home Phone Work Phone Mobile Phone    Alphonso King (Father) [phone] -- --    Ernesto Chavez (Step Parent) -- -- [phone]               History & Physical      Tee Eller MD at 10/31/20 2353              [facility] Medicine Services  HISTORY AND PHYSICAL    Patient Name: Chandler King  : 1985  MRN: [id number]  Primary Care Physician: Han Reyes APRN  Date of admission: 10/31/2020      Subjective   Subjective     Chief Complaint:  Alcohol abuse    HPI:  Chandler King is a 35 y.o. male " with a history of HTN, asthma, seizures, alcohol abuse presents to the ED with complaints of alcohol abuse.  Patient reports that he has been drinking 24 tall boys daily for several months.  His last drink was around 4 PM today.  He is interested in getting help to stop drinking.  He did go to the [facility] earlier today and was referred to the ED due to history of seizures during withdrawal.  He denies any fevers, shortness of air, chest pain, nausea, vomiting, tremors, abdominal pain, or any other complaints at this time.  He states that he usually works approximately 12 hours a day at [employer] and does not start having withdrawal until after going home from work.  He has been to inpatient rehab approximately 7 years ago.  Ethanol level was 237 upon arrival.  UDS negative.  Patient is being admitted to the hospital medicine service for further evaluation and management.      Current COVID Risks are:  [] Fever []  Cough [] Shortness of breath [] Fatigue [] Change in taste or smell    [] Exposure to COVID positive patient  [] High risk facility   [x]  NONE    Review of Systems   Constitutional: Negative.    HENT: Negative.    Eyes: Negative.    Respiratory: Negative.    Cardiovascular: Negative.    Gastrointestinal: Negative.    Endocrine: Negative.    Genitourinary: Negative.    Musculoskeletal: Negative.    Skin: Negative.    Allergic/Immunologic: Negative.    Neurological: Negative.    Hematological: Negative.    Psychiatric/Behavioral: Negative.           Personal History     Past Medical History:   Diagnosis Date   • Asthma    • Hypertension    • Scoliosis    • Seizures (CMS/HCC)        Past Surgical History:   Procedure Laterality Date   • COLONOSCOPY     • HERNIA REPAIR     • TESTICLE SURGERY         Family History: family history includes Arthritis in his father and mother. Otherwise pertinent FHx was reviewed and unremarkable.     Social History:  reports that he has been smoking cigarettes. He started  smoking about 16 years ago. He has been smoking about 1.00 pack per day. He has never used smokeless tobacco. He reports current alcohol use of about 21.0 standard drinks of alcohol per week. He reports previous drug use. Drug: Marijuana.  Social History     Social History Narrative   • Not on file       Medications:  Available home medication information reviewed.  (Not in a hospital admission)      Allergies   Allergen Reactions   • Penicillins Unknown (See Comments)     Unknown reaction       Objective   Objective     Vital Signs:   Temp:  [99.6 °F (37.6 °C)] 99.6 °F (37.6 °C)  Heart Rate:  [111] 111  Resp:  [18] 18  BP: ()/(59-84) 102/62        Physical Exam  Constitutional:       General: He is not in acute distress.  HENT:      Head: Normocephalic.      Nose: Nose normal.      Mouth/Throat:      Mouth: Mucous membranes are moist.   Eyes:      Pupils: Pupils are equal, round, and reactive to light.   Neck:      Musculoskeletal: Normal range of motion.   Cardiovascular:      Rate and Rhythm: Regular rhythm. Tachycardia present.      Pulses: Normal pulses.      Heart sounds: Normal heart sounds. No murmur. No friction rub. No gallop.    Pulmonary:      Effort: Pulmonary effort is normal. No respiratory distress.      Breath sounds: Normal breath sounds. No wheezing, rhonchi or rales.   Abdominal:      General: Bowel sounds are normal. There is no distension.      Palpations: Abdomen is soft.      Tenderness: There is no abdominal tenderness. There is no guarding or rebound.   Musculoskeletal: Normal range of motion.         General: No swelling, tenderness or signs of injury.   Skin:     General: Skin is warm and dry.      Coloration: Skin is not pale.      Findings: No erythema or rash.      Comments: 2 scratch marks on left shin from dog   Neurological:      General: No focal deficit present.      Mental Status: He is alert and oriented to person, place, and time.      Cranial Nerves: No cranial nerve  deficit.      Sensory: No sensory deficit.   Psychiatric:         Mood and Affect: Mood normal.         Behavior: Behavior normal.         Thought Content: Thought content normal.            Results Reviewed:  I have personally reviewed most recent indicated data and agree with findings including:  [x]  Laboratory  [x]  Radiology  [x]  EKG/Telemetry  []  Pathology  []  Cardiac/Vascular Studies  [x]  Old records  []  Other:  Most pertinent findings include:          Results from last 7 days   Lab Units 10/31/20  2147   SODIUM mmol/L 141   POTASSIUM mmol/L 4.1   CHLORIDE mmol/L 105   CO2 mmol/L 21.0*   BUN mg/dL 4*   CREATININE mg/dL 0.55*   GLUCOSE mg/dL 79   CALCIUM mg/dL 8.6   ALT (SGPT) U/L 48*   AST (SGOT) U/L 56*     Estimated Creatinine Clearance: 206.4 mL/min (A) (by C-G formula based on SCr of 0.55 mg/dL (L)).  Brief Urine Lab Results     None        Imaging Results (Last 24 Hours)     ** No results found for the last 24 hours. **             Assessment/Plan   Assessment & Plan     Active Hospital Problems    Diagnosis POA   • **Alcohol withdrawal (CMS/HCC) [F10.239] Yes   • Alcohol withdrawal syndrome without complication (CMS/HCC) [F10.230] Yes   • Tobacco use disorder [F17.200] Yes   • Essential hypertension [I10] Yes   • Elevated liver enzymes [R74.8] Yes   • Seizure (CMS/HCC) [R56.9] Yes     Dung Knight is a 35 y.o. male with a history of HTN, asthma, seizures, alcohol abuse presents to the ED with complaints of alcohol abuse.  Patient reports that he has been drinking 24 tall boys daily for several months.  His last drink was around 4 PM today.      Assessment and Plan:    Alcohol withdrawal  History of seizure  --ethanol level 237, UDS negative  --CIWA protocol  --Librium 25 mg Q 6 hours  --prn ativan  --seizure precautions  --consult case management for discharge planning  --Rally pack  --am labs    Elevated transaminases  --IV fluids  --monitor    HTN  --coreg    Tobacco abuse  --smoking cessation  education  --nicotine patch        DVT prophylaxis:  Lovenox      CODE STATUS:    Code Status and Medical Interventions:   Ordered at: 11/01/20 0004     Level Of Support Discussed With:    Patient     Code Status:    CPR     Medical Interventions (Level of Support Prior to Arrest):    Full       Electronically signed by BLAINE Hager, 10/31/20, 11:53 PM EDT.    Brief Attending Admission Attestation     I have seen and examined the patient, performing an independent face-to-face diagnostic evaluation with plan of care reviewed and developed with the advanced practice clinician (APC).         Vitals:    11/01/20 0030   BP: 106/73   Pulse:  111   Resp:  18-97% on room air   Temp:  99.6   SpO2:        Attending Physical Exam:  RS- CTA-BL, ,  No wheezing , no crackles, good effort.  CVS- s1s2 regular, no murmur.  ABD- soft, non tender, not distended, no organomegaly.  EXT- no edema.  NEURO- pt is sleepy, will respond to verbal stimuli, but fall back to sleep immediately.    Old records reviewed and summarized in PM hx.    Brief Summary Statement:  35-year-old male, with history of significant alcohol use-last drink being at 4 PM, presented to ED for evaluation of possible rehab placement.  Initial alcohol level was 237.      In ED patient got Ativan 1.5 mg  Remainder of detailed HPI is as noted above and has been reviewed and/or edited by me for completeness.    PM HX:  Mood disorder/anxiety -trazodone 50 mg p.o. at bedtime, Valium 2 mg p.o. daily  Hypertension-Coreg 6.25 every 12  History of alcohol abuse, alcohol-withdrawal seizure -on folic acid 1 mg p.o. daily,  Hx asthma      LABS:  Blood glucose-79  BUN/creatinine 4/0.5, ALT 48, AST 56, total bili 0.2  WBC 10, hemoglobin 17, platelets 218  EKG/chest x-ray not done.  Covid testing pending.    Brief Assessment/Plan  Case Mx consult for Rehab [facility],  [facility] protocol for now.    See above for further detailed assessment and plan developed with APC which I  have reviewed and/or edited for completeness.  Admission Status: I believe that this patient meets inpt criteria, due to need for monitoring of Alcohol withdrawal sxs.        Electronically signed by Tee Eller MD at 11/01/20 0413       Vital Signs (last day)     Date/Time   Temp   Temp src   Pulse   Resp   BP   Patient Position   SpO2    11/01/20 1148   98.3 (36.8)   Oral   104   18   (!) 146/105   Sitting   --    11/01/20 1000   --   --   98   --   --   --   --    11/01/20 0800   --   --   107   --   --   --   --    11/01/20 0748   98.4 (36.9)   Oral   --   16   118/69   Lying   --    11/01/20 0312   98.7 (37.1)   Oral   95   18   127/82   Lying   92    11/01/20 0119   98 (36.7)   Oral   --   --   128/88   Lying   --    11/01/20 0030   --   --   --   --   106/73   --   --    11/01/20 0022   --   --   --   --   --   --   97    11/01/20 0000   --   --   --   --   102/62   --   --    10/31/20 2358   --   --   --   --   --   --   96    10/31/20 2300   --   --   --   --   114/84   --   --    10/31/20 2259   --   --   --   --   --   --   94    10/31/20 2230   --   --   --   --   96/60   --   --    10/31/20 2200   --   --   --   --   106/59   --   97    10/31/20 2145   99.6 (37.6)   Oral   111   18   132/80   Lying   97    10/31/20 2141   --   --   --   --   --   --   98    10/31/20 2139   --   --   --   --   132/80   --   --              CIWA (since admission)     Date/Time CIWA-Ar Score    11/01/20 1154  18    11/01/20 1000  13    11/01/20 0731  17    11/01/20 0600  11    11/01/20 0400  10    11/01/20 0200  10    11/01/20 0115  11    10/31/20 21:49:16  5          Facility-Administered Medications as of 11/1/2020   Medication Dose Route Frequency Provider Last Rate Last Dose   • acetaminophen (TYLENOL) tablet 650 mg  650 mg Oral Q4H PRN Ruma Sanchez APRN        Or   • acetaminophen (TYLENOL) 160 MG/5ML solution 650 mg  650 mg Oral Q4H PRN Ruma Sanchez APRN        Or   • acetaminophen (TYLENOL)  suppository 650 mg  650 mg Rectal Q4H PRN Ruma Sanchez APRN       • albuterol (PROVENTIL) nebulizer solution 0.083% 2.5 mg/3mL  2.5 mg Nebulization Q4H PRN Ruma Sanchez APRN       • carvedilol (COREG) tablet 6.25 mg  6.25 mg Oral BID With Meals Ruma Sanchez APRN   6.25 mg at 11/01/20 0922   • chlordiazePOXIDE (LIBRIUM) capsule 25 mg  25 mg Oral Q6H Tee Eller MD   25 mg at 11/01/20 0922   • enoxaparin (LOVENOX) syringe 40 mg  40 mg Subcutaneous Q AM Ruma Sanchez APRN   40 mg at 11/01/20 0722   • LORazepam (ATIVAN) tablet 1 mg  1 mg Oral Q2H PRN Ruma Sanchez APRN        Or   • LORazepam (ATIVAN) injection 1 mg  1 mg Intravenous Q2H PRN Ruma Sanchez APRN        Or   • LORazepam (ATIVAN) tablet 2 mg  2 mg Oral Q1H PRN Ruma Sanchez APRN        Or   • LORazepam (ATIVAN) injection 2 mg  2 mg Intravenous Q1H PRN Ruma Sanchez APRN   2 mg at 11/01/20 1207    Or   • LORazepam (ATIVAN) injection 2 mg  2 mg Intravenous Q15 Min PRN Ruma Sanchez APRN        Or   • LORazepam (ATIVAN) injection 2 mg  2 mg Intramuscular Q15 Min PRN Ruma Sanchez APRN       • [COMPLETED] LORazepam (ATIVAN) injection 1.5 mg  1.5 mg Intravenous Once Kyle Haywood DO   1.5 mg at 11/01/20 0511   • multiple vitamin (M.V.I. Adult) 10 mL, thiamine (B-1) 100 mg, folic acid 1 mg in sodium chloride 0.9 % 1,000 mL infusion  100 mL/hr Intravenous Daily Ruma Sanchez APRN 100 mL/hr at 11/01/20 0923 100 mL/hr at 11/01/20 0923   • multivitamin with minerals 1 tablet  1 tablet Oral Daily Ruma Sanchez APRN   1 tablet at 11/01/20 0922   • nicotine (NICODERM CQ) 21 MG/24HR patch 1 patch  1 patch Transdermal Q AM Ruma Sanchez APRN   1 patch at 11/01/20 0245   • ondansetron (ZOFRAN) tablet 4 mg  4 mg Oral Q6H PRN Ruma Sanchez APRN        Or   • ondansetron (ZOFRAN) injection 4 mg  4 mg Intravenous Q6H PRN Ruma Sanchez APRN   4 mg at  11/01/20 0734   • sodium chloride 0.9 % flush 10 mL  10 mL Intravenous PRN Luis Muro MD       • sodium chloride 0.9 % flush 10 mL  10 mL Intravenous Q12H Ruma Sanchez APRN   10 mL at 11/01/20 0923   • sodium chloride 0.9 % flush 10 mL  10 mL Intravenous PRN Ruma Sanchez APRN         Lab Results (all)     Procedure Component Value Units Date/Time    Basic Metabolic Panel [[id number]]  (Abnormal) Collected: 11/01/20 1008    Specimen: Blood Updated: 11/01/20 1122     Glucose 70 mg/dL      BUN 6 mg/dL      Creatinine 0.55 mg/dL      Sodium 139 mmol/L      Potassium 4.6 mmol/L      Chloride 103 mmol/L      CO2 22.0 mmol/L      Calcium 8.6 mg/dL      eGFR Non African Amer >150 mL/min/1.73      BUN/Creatinine Ratio 10.9     Anion Gap 14.0 mmol/L     Narrative:      GFR Normal >60  Chronic Kidney Disease <60  Kidney Failure <15      Magnesium [[id number]]  (Normal) Collected: 11/01/20 1008    Specimen: Blood Updated: 11/01/20 1120     Magnesium 2.1 mg/dL     TSH [[id number]]  (Normal) Collected: 11/01/20 1008    Specimen: Blood Updated: 11/01/20 1117     TSH 1.270 uIU/mL     CBC (No Diff) [[id number]]  (Abnormal) Collected: 11/01/20 1008    Specimen: Blood Updated: 11/01/20 1049     WBC 10.50 10*3/mm3      RBC 5.06 10*6/mm3      Hemoglobin 16.6 g/dL      Hematocrit 50.2 %      MCV 99.2 fL      MCH 32.8 pg      MCHC 33.1 g/dL      RDW 15.3 %      RDW-SD 56.3 fl      MPV 10.9 fL      Platelets 195 10*3/mm3     COVID-19,CEPHEID,STEFAN IN-HOUSE(OR EMERGENT/ADD-ON),NP SWAB IN TRANSPORT MEDIA 3-4 HR TAT - Swab, Nasopharynx [[id number]]  (Normal) Collected: 11/01/20 0106    Specimen: Swab from Nasopharynx Updated: 11/01/20 0418     COVID19 Not Detected    Narrative:      Fact sheet for providers: https://www.fda.gov/media/265089/download     Fact sheet for patients: https://www.fda.gov/media/438462/download    CBC & Differential [[id number]]  (Abnormal) Collected: 10/31/20 2147    Specimen: Blood Updated:  11/01/20 0106    Narrative:      The following orders were created for panel order CBC & Differential.  Procedure                               Abnormality         Status                     ---------                               -----------         ------                     CBC Auto Differential[[id number]]        Abnormal            Final result                 Please view results for these tests on the individual orders.    CBC Auto Differential [[id number]]  (Abnormal) Collected: 10/31/20 2147    Specimen: Blood Updated: 11/01/20 0106     WBC 10.61 10*3/mm3      RBC 5.42 10*6/mm3      Hemoglobin 17.4 g/dL      Hematocrit 52.6 %      MCV 97.0 fL      MCH 32.1 pg      MCHC 33.1 g/dL      RDW 15.5 %      RDW-SD 54.6 fl      MPV 11.5 fL      Platelets 218 10*3/mm3      Neutrophil % 53.2 %      Lymphocyte % 30.9 %      Monocyte % 11.8 %      Eosinophil % 1.9 %      Basophil % 1.5 %      Immature Grans % 0.7 %      Neutrophils, Absolute 5.65 10*3/mm3      Lymphocytes, Absolute 3.28 10*3/mm3      Monocytes, Absolute 1.25 10*3/mm3      Eosinophils, Absolute 0.20 10*3/mm3      Basophils, Absolute 0.16 10*3/mm3      Immature Grans, Absolute 0.07 10*3/mm3      nRBC 0.0 /100 WBC     Urine Drug Screen - Urine, Clean Catch [[id number]]  (Normal) Collected: 10/31/20 2306    Specimen: Urine, Clean Catch Updated: 10/31/20 2341     THC, Screen, Urine Negative     Phencyclidine (PCP), Urine Negative     Cocaine Screen, Urine Negative     Methamphetamine, Ur Negative     Opiate Screen Negative     Amphetamine Screen, Urine Negative     Benzodiazepine Screen, Urine Negative     Tricyclic Antidepressants Screen Negative     Methadone Screen, Urine Negative     Barbiturates Screen, Urine Negative     Oxycodone Screen, Urine Negative     Propoxyphene Screen Negative     Buprenorphine, Screen, Urine Negative    Narrative:      Cutoff For Drugs Screened:    Amphetamines               500 ng/ml  Barbiturates               200  ng/ml  Benzodiazepines            150 ng/ml  Cocaine                    150 ng/ml  Methadone                  200 ng/ml  Opiates                    100 ng/ml  Phencyclidine               25 ng/ml  THC                            50 ng/ml  Methamphetamine            500 ng/ml  Tricyclic Antidepressants  300 ng/ml  Oxycodone                  100 ng/ml  Propoxyphene               300 ng/ml  Buprenorphine               10 ng/ml    The normal value for all drugs tested is negative. This report includes unconfirmed screening results, with the cutoff values listed, to be used for medical treatment purposes only.  Unconfirmed results must not be used for non-medical purposes such as employment or legal testing.  Clinical consideration should be applied to any drug of abuse test, particularly when unconfirmed results are used.      Ethanol [[id number]]  (Abnormal) Collected: 10/31/20 2306    Specimen: Blood Updated: 10/31/20 2336     Ethanol 237 mg/dL     Comprehensive Metabolic Panel [[id number]]  (Abnormal) Collected: 10/31/20 2147    Specimen: Blood Updated: 10/31/20 2309     Glucose 79 mg/dL      BUN 4 mg/dL      Creatinine 0.55 mg/dL      Sodium 141 mmol/L      Potassium 4.1 mmol/L      Comment: Slight hemolysis detected by analyzer. Results may be affected.        Chloride 105 mmol/L      CO2 21.0 mmol/L      Calcium 8.6 mg/dL      Total Protein 7.0 g/dL      Albumin 4.30 g/dL      ALT (SGPT) 48 U/L      AST (SGOT) 56 U/L      Alkaline Phosphatase 82 U/L      Total Bilirubin 0.2 mg/dL      eGFR Non African Amer >150 mL/min/1.73      Globulin 2.7 gm/dL      A/G Ratio 1.6 g/dL      BUN/Creatinine Ratio 7.3     Anion Gap 15.0 mmol/L     Narrative:      GFR Normal >60  Chronic Kidney Disease <60  Kidney Failure <15      Gray Draw [[id number]] Collected: 10/31/20 2147    Specimen: Blood Updated: 10/31/20 2300    Narrative:      The following orders were created for panel order Gray Draw.  Procedure                                Abnormality         Status                     ---------                               -----------         ------                     Light Blue Top[[id number]]                                   Final result               Green Top (Gel)[[id number]]                                  Final result               Lavender Top[[id number]]                                     Final result               Gold Top - SST[[id number]]                                   Final result                 Please view results for these tests on the individual orders.    Light Blue Top [[id number]] Collected: 10/31/20 2147    Specimen: Blood Updated: 10/31/20 2300     Extra Tube hold for add-on     Comment: Auto resulted       Green Top (Gel) [[id number]] Collected: 10/31/20 2147    Specimen: Blood Updated: 10/31/20 2300     Extra Tube Hold for add-ons.     Comment: Auto resulted.       Lavender Top [[id number]] Collected: 10/31/20 2147    Specimen: Blood Updated: 10/31/20 2300     Extra Tube hold for add-on     Comment: Auto resulted       Gold Top - SST [[id number]] Collected: 10/31/20 2147    Specimen: Blood Updated: 10/31/20 2300     Extra Tube Hold for add-ons.     Comment: Auto resulted.             Imaging Results (All)     None        ECG/EMG Results (all)     None           Physician Progress Notes (all)      Renetta Tay DO at [address] [facility]              [facility] Medicine Services  PROGRESS NOTE    Patient Name: Dung Knight  : 1985  MRN: [id number]    Date of Admission: 10/31/2020  Primary Care Physician: Han Reyes APRN    Subjective   Subjective     CC:  F/U alcohol withdrawal     HPI:  Pt seen and examined. RN notes reviewed. No acute events overnight. Pt states he is anxious but just got some ativan. States he has had alcohol withdrawal before.     Review of Systems  Gen- No fevers, chills  CV- No chest pain, palpitations  Resp- No cough, dyspnea  GI- No N/V/D, abd  pain    Objective   Objective     Vital Signs:   Temp:  [98 °F (36.7 °C)-99.6 °F (37.6 °C)] 98.4 °F (36.9 °C)  Heart Rate:  [] 98  Resp:  [16-18] 16  BP: ()/(59-88) 118/69        Physical Exam:  Constitutional: No acute distress, awake, alert, lying in bed  HENT: NCAT, mucous membranes moist  Respiratory: Clear to auscultation bilaterally, respiratory effort normal   Cardiovascular: RRR, no murmurs, rubs, or gallops, palpable pedal pulses bilaterally  Gastrointestinal: Positive bowel sounds, soft, nontender, nondistended  Musculoskeletal: No bilateral ankle edema  Psychiatric: Appropriate affect, cooperative  Neurologic: Oriented x 3, strength symmetric in all extremities, Cranial Nerves grossly intact to confrontation, speech clear  Skin: No rashes    Results Reviewed:  Results from last 7 days   Lab Units 10/31/20  2147   WBC 10*3/mm3 10.61   HEMOGLOBIN g/dL 17.4   HEMATOCRIT % 52.6*   PLATELETS 10*3/mm3 218     Results from last 7 days   Lab Units 10/31/20  2147   SODIUM mmol/L 141   POTASSIUM mmol/L 4.1   CHLORIDE mmol/L 105   CO2 mmol/L 21.0*   BUN mg/dL 4*   CREATININE mg/dL 0.55*   GLUCOSE mg/dL 79   CALCIUM mg/dL 8.6   ALT (SGPT) U/L 48*   AST (SGOT) U/L 56*     Estimated Creatinine Clearance: 205 mL/min (A) (by C-G formula based on SCr of 0.55 mg/dL (L)).    Microbiology Results Abnormal     Procedure Component Value - Date/Time    COVID-19,CEPHEID,STEFAN IN-HOUSE(OR EMERGENT/ADD-ON),NP SWAB IN TRANSPORT MEDIA 3-4 HR TAT - Swab, Nasopharynx [[id number]]  (Normal) Collected: 11/01/20 0106    Lab Status: Final result Specimen: Swab from Nasopharynx Updated: 11/01/20 0418     COVID19 Not Detected    Narrative:      Fact sheet for providers: https://www.fda.gov/media/916226/download     Fact sheet for patients: https://www.fda.gov/media/156649/download          Imaging Results (Last 24 Hours)     ** No results found for the last 24 hours. **               I have reviewed the medications:  Scheduled  Meds:carvedilol, 6.25 mg, Oral, BID With Meals  chlordiazePOXIDE, 25 mg, Oral, Q6H  enoxaparin, 40 mg, Subcutaneous, Q AM  IV Fluids 1000 mL + additives, 100 mL/hr, Intravenous, Daily  multivitamin with minerals, 1 tablet, Oral, Daily  nicotine, 1 patch, Transdermal, Q AM  sodium chloride, 10 mL, Intravenous, Q12H      Continuous Infusions:   PRN Meds:.•  acetaminophen **OR** acetaminophen **OR** acetaminophen  •  albuterol  •  LORazepam **OR** LORazepam **OR** LORazepam **OR** LORazepam **OR** LORazepam **OR** LORazepam  •  ondansetron **OR** ondansetron  •  sodium chloride  •  sodium chloride    Assessment/Plan   Assessment & Plan     Active Hospital Problems    Diagnosis  POA   • **Alcohol withdrawal (CMS/HCC) [F10.239]  Yes   • Alcohol withdrawal syndrome without complication (CMS/HCC) [F10.230]  Yes   • Tobacco use disorder [F17.200]  Yes   • Essential hypertension [I10]  Yes   • Elevated liver enzymes [R74.8]  Yes   • Seizure (CMS/HCC) [R56.9]  Yes      Resolved Hospital Problems   No resolved problems to display.        Brief Hospital Course to date:  Dung Knight is a 35 y.o. male with a history of HTN, asthma, seizures, alcohol abuse presents to the ED with complaints of alcohol abuse.  Patient reports that he has been drinking 24 tall boys daily for several months.  His last drink was around 4 PM on 10/31.    This patient's problems and plans were partially entered by my partner and updated as appropriate by me 11/01/20.  All problems are new to me today     Alcohol withdrawal  History of seizure  --CIWA protocol  --Librium 25 mg Q 6 hours  --prn ativan  --seizure precautions  --case management for discharge planning  --Consult in AM to Addiction team   --Rally pack, start PO thiamine/folic acid tomorrow  --Continue Multivitamin      Elevated transaminases  --Repeat CMP in AM  --If further elevated, may need US     HTN  --Continue Coreg     Tobacco abuse  --smoking cessation education provided   --continue  nicotine patch    DVT Prophylaxis:  Lovenox    Disposition: I expect the patient to be discharged TBD    CODE STATUS:   Code Status and Medical Interventions:   Ordered at: 11/01/20 0004     Level Of Support Discussed With:    Patient     Code Status:    CPR     Medical Interventions (Level of Support Prior to Arrest):    Full       Renetta Tay DO  11/01/20                Electronically signed by Renetta Tay DO at 11/01/20 1019       Consult Notes (all)    No notes of this type exist for this encounter.

## 2020-11-02 PROCEDURE — 25010000002 THIAMINE PER 100 MG: Performed by: NURSE PRACTITIONER

## 2020-11-02 PROCEDURE — 99232 SBSQ HOSP IP/OBS MODERATE 35: CPT | Performed by: INTERNAL MEDICINE

## 2020-11-02 PROCEDURE — 25010000002 LORAZEPAM PER 2 MG: Performed by: NURSE PRACTITIONER

## 2020-11-02 RX ORDER — CHOLECALCIFEROL (VITAMIN D3) 125 MCG
5 CAPSULE ORAL NIGHTLY PRN
Status: DISCONTINUED | OUTPATIENT
Start: 2020-11-02 | End: 2020-11-03 | Stop reason: HOSPADM

## 2020-11-02 RX ADMIN — NICOTINE POLACRILEX 2 MG: 2 GUM, CHEWING BUCCAL at 20:40

## 2020-11-02 RX ADMIN — LORAZEPAM 2 MG: 2 INJECTION INTRAMUSCULAR; INTRAVENOUS at 20:39

## 2020-11-02 RX ADMIN — LORAZEPAM 2 MG: 2 INJECTION INTRAMUSCULAR; INTRAVENOUS at 08:07

## 2020-11-02 RX ADMIN — SODIUM CHLORIDE, PRESERVATIVE FREE 10 ML: 5 INJECTION INTRAVENOUS at 20:40

## 2020-11-02 RX ADMIN — CARVEDILOL 6.25 MG: 3.12 TABLET, FILM COATED ORAL at 17:32

## 2020-11-02 RX ADMIN — NICOTINE POLACRILEX 2 MG: 2 GUM, CHEWING BUCCAL at 08:06

## 2020-11-02 RX ADMIN — Medication 1 PATCH: at 05:12

## 2020-11-02 RX ADMIN — ACETAMINOPHEN 650 MG: 325 TABLET, FILM COATED ORAL at 12:55

## 2020-11-02 RX ADMIN — LORAZEPAM 2 MG: 2 INJECTION INTRAMUSCULAR; INTRAVENOUS at 14:58

## 2020-11-02 RX ADMIN — LORAZEPAM 2 MG: 2 INJECTION INTRAMUSCULAR; INTRAVENOUS at 12:27

## 2020-11-02 RX ADMIN — CHLORDIAZEPOXIDE HYDROCHLORIDE 25 MG: 25 CAPSULE ORAL at 12:26

## 2020-11-02 RX ADMIN — CARVEDILOL 6.25 MG: 3.12 TABLET, FILM COATED ORAL at 08:06

## 2020-11-02 RX ADMIN — FOLIC ACID 100 ML/HR: 5 INJECTION, SOLUTION INTRAMUSCULAR; INTRAVENOUS; SUBCUTANEOUS at 08:07

## 2020-11-02 RX ADMIN — CHLORDIAZEPOXIDE HYDROCHLORIDE 25 MG: 25 CAPSULE ORAL at 05:12

## 2020-11-02 RX ADMIN — NICOTINE POLACRILEX 2 MG: 2 GUM, CHEWING BUCCAL at 05:19

## 2020-11-02 RX ADMIN — LORAZEPAM 2 MG: 2 INJECTION INTRAMUSCULAR; INTRAVENOUS at 16:05

## 2020-11-02 RX ADMIN — LORAZEPAM 2 MG: 2 INJECTION INTRAMUSCULAR; INTRAVENOUS at 01:52

## 2020-11-02 RX ADMIN — SODIUM CHLORIDE, PRESERVATIVE FREE 10 ML: 5 INJECTION INTRAVENOUS at 08:07

## 2020-11-02 RX ADMIN — CHLORDIAZEPOXIDE HYDROCHLORIDE 25 MG: 25 CAPSULE ORAL at 17:32

## 2020-11-02 RX ADMIN — NICOTINE POLACRILEX 2 MG: 2 GUM, CHEWING BUCCAL at 12:27

## 2020-11-02 RX ADMIN — LORAZEPAM 2 MG: 2 INJECTION INTRAMUSCULAR; INTRAVENOUS at 05:12

## 2020-11-02 RX ADMIN — LORAZEPAM 2 MG: 2 INJECTION INTRAMUSCULAR; INTRAVENOUS at 17:33

## 2020-11-02 NOTE — PROGRESS NOTES
Discharge Planning Assessment  [facility]     Patient Name: Dung Knight  MRN: [id number]  Today's Date: 11/2/2020    Admit Date: 10/31/2020    Discharge Needs Assessment     Row Name 11/02/20 0822       Living Environment    Lives With  spouse;child(sreekanth), dependent;parent(s)    Name(s) of Who Lives With Patient  Alphonso Knight (father) [phone]    Current Living Arrangements  home/apartment/condo    Primary Care Provided by  self    Provides Primary Care For  no one    Family Caregiver if Needed  parent(s);spouse    Able to Return to Prior Arrangements  yes       Resource/Environmental Concerns    Resource/Environmental Concerns  none    Transportation Concerns  car, none       Transition Planning    Patient/Family Anticipates Transition to  home with family    Patient/Family Anticipated Services at Transition  none    Transportation Anticipated  family or friend will provide       Discharge Needs Assessment    Readmission Within the Last 30 Days  no previous admission in last 30 days    Equipment Currently Used at Home  none    Concerns to be Addressed  denies needs/concerns at this time    Anticipated Changes Related to Illness  none    Equipment Needed After Discharge  none        Discharge Plan     Row Name 11/02/20 0823       Plan    Plan  Home with family    Patient/Family in Agreement with Plan  yes    Plan Comments  Spoke with patient at bedside. Lives with spouse and dependent children in [facility]. Contact is Alphonso Knight (father) [phone]. Is independent with ADL's. No problems with [employer] or medications. No DME at home. Plan is home with family. CM will continue to follow.    Final Discharge Disposition Code  01 - home or self-care        Continued Care and Services - Admitted Since 10/31/2020    Coordination has not been started for this encounter.         Demographic Summary     Row Name 11/02/20 0821       General Information    Admission Type  inpatient    Arrived From  emergency  department    Referral Source  admission list    Reason for Consult  discharge planning    Preferred Language  English     Used During This Interaction  no       Contact Information    Permission Granted to Share Info With      Contact Information Obtained for      Contact Information Comments  PCP is BLAINE Verdugo        Functional Status     Row Name 11/02/20 0821       Functional Status    Usual Activity Tolerance  excellent    Current Activity Tolerance  excellent       Functional Status, IADL    Medications  independent    Meal Preparation  independent    Housekeeping  independent    Laundry  independent    Shopping  independent       Mental Status    General Appearance WDL  WDL       Mental Status Summary    Recent Changes in Mental Status/Cognitive Functioning  no changes       Employment/    Employment Status  employed part-time        Psychosocial    No documentation.       Abuse/Neglect    No documentation.       Legal    No documentation.       Substance Abuse    No documentation.       Patient Forms    No documentation.           Denton Foley RN

## 2020-11-02 NOTE — PROGRESS NOTES
"                  Clinical Nutrition     Reason for Visit:   Identified at risk by screening criteria, MST score 2+, Unintentional weight loss    Patient Name: Dung Knight  YOB: 1985  MRN: [id number]  Date of Encounter: 11/02/20 15:02 EST  Admission date: 10/31/2020    Nutrition Assessment   Assessment     Admission diagnosis  EtOH abuse    Additional diagnosis/conditions/procedures this admission  Tobacco  Withdrawal  Elevated LFTs    Additional PMH/procedures:  HTN  Asthma  Seizures  EtOH abuse    Hernia repair    Reported/Observed/Food/Nutrition Related History:      Patient reports unsure of any recent weight loss. He reports over the past year or year/half his weight has fluctuated from 205 to 165 lbs. He reports his weight is \"always fluctuating.\" Reports that prior to admission at [facility] he had not eaten for 8 days. He is working with the [employer] associates on meal preferences now. Reported eating well for lunch today. Patient denies any noticeable physical changes like muscle loss. Offered oral nutrition supplements to assist with increasing kcal/protien intake. He is not interested at this time. Encouraged patient to work with [employer] staff on meal preferences. Denies any specific requests/needs at this time.      Anthropometrics     Height: 170.2 cm (67\")  Last filed wt: Weight: 76.6 kg (168 lb 12.8 oz) (11/01/20 0119)  Weight Method: Bed scale    BMI: BMI (Calculated): 26.4  Overweight: 25.0-29.9kg/m2     Ideal Body Weight (IBW) (kg): 68.1  Admission wt: 170 lb  Method obtained: stated weight per charting 10/31    Weight Change   UBW: 205 lb, per patient report his weight fluctuates from 165 - 200 lbs.  Weight change: ~37 lb   % wt change: 18%  Time frame of weight loss: over a year ago (9/2020)    Weight Weight (kg) Weight (lbs) Weight Method VISIT REPORT   8/24/2018 70.308 kg 155 lb     1/28/2019 72.576 kg 160 lb Stated    1/28/2019 69.854 kg 154 lb Standing scale    2/7/2019 77.111 kg 170 " lb     3/7/2019 77.111 kg 170 lb     9/24/2019 93.35 kg 205 lb 12.8 oz Standing scale    10/13/2020 79.379 kg 175 lb Estimated;Stated    10/31/2020 77.111 kg 170 lb Stated    11/1/2020 76.567 kg 168 lb 12.8 oz Bed scale        Labs reviewed     Results from last 7 days   Lab Units 11/01/20  1008 10/31/20  2147   GLUCOSE mg/dL 70 79   BUN mg/dL 6 4*   CREATININE mg/dL 0.55* 0.55*   SODIUM mmol/L 139 141   CHLORIDE mmol/L 103 105   POTASSIUM mmol/L 4.6 4.1   MAGNESIUM mg/dL 2.1  --    ALT (SGPT) U/L  --  48*     Results from last 7 days   Lab Units 10/31/20  2147   ALBUMIN g/dL 4.30           No results found for: HGBA1C      Medications reviewed   Pertinent: librium, MVI - 100 mg thiamine, folic acid 1 mg @ 100 mL/hr      Intake/Output 24 hrs (7:00AM - 6:59 AM)     Intake & Output (last day)       11/01 0701 - 11/02 0700 11/02 0701 - 11/03 0700    P.O. 120     Total Intake(mL/kg) 120 (1.6)     Urine (mL/kg/hr)      Total Output      Net +120           Urine Unmeasured Occurrence 1 x           Current Nutrition Prescription     PO: Diet Regular  Intake: insuff data - 100% x 1 snack    Nutrition Diagnosis     11/2  Problem Predicted suboptimal energy intake   Etiology Decreased appetite    Signs/Symptoms Reported decreased appetite PTA, states he had not eaten anything for x8 days prior to [facility] admission     11/2  Problem Unintended weight loss   Etiology ?EtOH abuse/history of EtOH abuse   Signs/Symptoms Reported unintentional weight loss of ~37 lbs (18%) since 9/2020       Nutrition Intervention     1.  Follow treatment progress, Care plan reviewed  2.  Advise alternate selection, Interview for preferences   3. Supplements offered/declined at this time  4. Encouraged oral intake  5. Monitor weight/intake trend  6. Recommend continue IV thiamine, folic acid d/t history of EtOH abuse    Goal:   General: Nutrition support treatment  PO: Establish PO      Monitoring/Evaluation:   Per protocol, PO intake, Pertinent labs,  Weight, Symptoms, POC/GOC    Will Continue to follow per protocol    Ayanna Diez RDN, LD  Time Spent: 30 minutes

## 2020-11-02 NOTE — PROGRESS NOTES
[facility] Medicine Services  PROGRESS NOTE    Patient Name: Dung Knight  : 1985  MRN: [id number]    Date of Admission: 10/31/2020  Primary Care Physician: Han Reyes APRN    Subjective   Subjective     CC:  ETOH withdrawal     HPI:  Last drink about 2 days ago per patient. States he has been in rehab before and went to the [facility] prior to being sent here. Feels ok.     Review of Systems  Gen- No fevers, chills  CV- No chest pain, palpitations  Resp- No cough, dyspnea  GI- No N/V/D, abd pain     Objective   Objective     Vital Signs:   Temp:  [97.6 °F (36.4 °C)-98.3 °F (36.8 °C)] 98.2 °F (36.8 °C)  Heart Rate:  [] 89  Resp:  [16-18] 18  BP: (111-143)/(74-95) 128/90        Physical Exam:  Constitutional: No acute distress, awake, alert  HENT: NCAT, mucous membranes moist  Respiratory: Clear to auscultation bilaterally, respiratory effort normal   Cardiovascular: RRR, no murmurs, rubs, or gallops, palpable pedal pulses bilaterally  Gastrointestinal: Positive bowel sounds, soft, nontender, nondistended  Musculoskeletal: No bilateral ankle edema  Psychiatric: Appropriate affect, cooperative  Neurologic: Oriented x 3, strength symmetric in all extremities, Cranial Nerves grossly intact to confrontation, speech clear  Skin: No rashes    Results Reviewed:  Results from last 7 days   Lab Units 20  1008 10/31/20  2147   WBC 10*3/mm3 10.50 10.61   HEMOGLOBIN g/dL 16.6 17.4   HEMATOCRIT % 50.2 52.6*   PLATELETS 10*3/mm3 195 218     Results from last 7 days   Lab Units 20  1008 10/31/20  2147   SODIUM mmol/L 139 141   POTASSIUM mmol/L 4.6 4.1   CHLORIDE mmol/L 103 105   CO2 mmol/L 22.0 21.0*   BUN mg/dL 6 4*   CREATININE mg/dL 0.55* 0.55*   GLUCOSE mg/dL 70 79   CALCIUM mg/dL 8.6 8.6   ALT (SGPT) U/L  --  48*   AST (SGOT) U/L  --  56*     Estimated Creatinine Clearance: 205 mL/min (A) (by WAYNE-G formula based on SCr of 0.55 mg/dL (L)).    Microbiology Results Abnormal      Procedure Component Value - Date/Time    COVID-19,CEPHEID,STEFAN IN-HOUSE(OR EMERGENT/ADD-ON),NP SWAB IN TRANSPORT MEDIA 3-4 HR TAT - Swab, Nasopharynx [[id number]]  (Normal) Collected: 11/01/20 0106    Lab Status: Final result Specimen: Swab from Nasopharynx Updated: 11/01/20 0418     COVID19 Not Detected    Narrative:      Fact sheet for providers: https://www.fda.gov/media/206285/download     Fact sheet for patients: https://www.fda.gov/media/178724/download          Imaging Results (Last 24 Hours)     ** No results found for the last 24 hours. **               I have reviewed the medications:  Scheduled Meds:carvedilol, 6.25 mg, Oral, BID With Meals  chlordiazePOXIDE, 25 mg, Oral, Q6H  enoxaparin, 40 mg, Subcutaneous, Q AM  IV Fluids 1000 mL + additives, 100 mL/hr, Intravenous, Daily  nicotine, 1 patch, Transdermal, Q AM  sodium chloride, 10 mL, Intravenous, Q12H      Continuous Infusions:   PRN Meds:.•  acetaminophen **OR** acetaminophen **OR** acetaminophen  •  albuterol  •  LORazepam **OR** LORazepam **OR** LORazepam **OR** LORazepam **OR** LORazepam **OR** LORazepam  •  nicotine polacrilex  •  ondansetron **OR** ondansetron  •  sodium chloride  •  sodium chloride    Assessment/Plan   Assessment & Plan     Active Hospital Problems    Diagnosis  POA   • **Alcohol withdrawal (CMS/HCC) [F10.239]  Yes   • Alcohol withdrawal syndrome without complication (CMS/HCC) [F10.230]  Yes   • Tobacco use disorder [F17.200]  Yes   • Essential hypertension [I10]  Yes   • Elevated liver enzymes [R74.8]  Yes   • Seizure (CMS/HCC) [R56.9]  Yes      Resolved Hospital Problems   No resolved problems to display.        Brief Hospital Course to date:  Dung Knight is a 35 y.o. male with a history of HTN, asthma, seizures, alcohol abuse presents to the ED with complaints of alcohol abuse.  Patient reports that he has been drinking 24 tall boys daily for several months.  His last drink was around 4 PM on 10/31.     This patient's  problems and plans were partially entered by my partner and updated as appropriate by me 11/02/20.  All problems are new to me today     Alcohol withdrawal  History of seizure  --CIWA protocol  --Librium 25 mg Q 6 hours  --prn ativan  --seizure precautions  --case management for discharge planning; addiction team consult   --Consult in AM to Addiction team   --Rally pack,  PO thiamine 11/2  - Requiring significant PRNs; will keep librium dosing the same      Elevated transaminases   --If further elevated, may need US  - monitor     HTN  --Continue Coreg     Tobacco abuse  --smoking cessation education provided   --continue nicotine patch     DVT Prophylaxis:  Lovenox     Disposition: I expect the patient to be discharged TBD    CODE STATUS:   Code Status and Medical Interventions:   Ordered at: 11/01/20 0004     Level Of Support Discussed With:    Patient     Code Status:    CPR     Medical Interventions (Level of Support Prior to Arrest):    Full       Jacqueline Ochoa,   11/02/20

## 2020-11-02 NOTE — PROGRESS NOTES
"Continued Stay Note  [facility]     Patient Name: Dung Knight  MRN: [id number]  Today's Date: 11/2/2020    Admit Date: 10/31/2020    Discharge Plan     Row Name 11/02/20 7278       Plan    Plan  Ongoing    Plan Comments  Consult received, thank you.  Attempted to round on patient twice today first time, patient was in the bathroom, second time, patient was sleeping soundly presumably from Ativan dosing received for CIWA of 17.  Chart reviewed extensively.  It appears the patient came from [facility] for medical clearance for detox, he has a hx of w/d seizures.  He reportedly drinks approx 20-24 \"tall boy\" beers on a daily basis.  BAL= 237 on 10/31/20@ 2306, UDS is negative.  It appears the patient has the potential to have fairy pronounced DT's.  Since he will go through ETOH detox here, we will work with him to find a suitable AUD treatment environment- since [facility] only does a detox admit.  It appears that he does not have any significant co-occurring psych dxs.  We will round on him again tomorrow and see if we can ascertain an appropriate level of care for treatment of his AUD, if the patient is amenable to this.        Discharge Codes    No documentation.             Sara Daniel RN ,BSN   Addiction Coordinator     "

## 2020-11-03 ENCOUNTER — READMISSION MANAGEMENT (OUTPATIENT)
Dept: CALL CENTER | Facility: HOSPITAL | Age: 35
End: 2020-11-03

## 2020-11-03 VITALS
TEMPERATURE: 97.5 F | WEIGHT: 168.8 LBS | HEART RATE: 82 BPM | DIASTOLIC BLOOD PRESSURE: 92 MMHG | HEIGHT: 67 IN | OXYGEN SATURATION: 100 % | SYSTOLIC BLOOD PRESSURE: 134 MMHG | BODY MASS INDEX: 26.49 KG/M2 | RESPIRATION RATE: 18 BRPM

## 2020-11-03 PROBLEM — R74.8 ELEVATED LIVER ENZYMES: Status: RESOLVED | Noted: 2019-01-28 | Resolved: 2020-11-03

## 2020-11-03 PROBLEM — R56.9 SEIZURE: Status: RESOLVED | Noted: 2019-01-28 | Resolved: 2020-11-03

## 2020-11-03 PROBLEM — F10.939 ALCOHOL WITHDRAWAL (HCC): Status: RESOLVED | Noted: 2020-10-31 | Resolved: 2020-11-03

## 2020-11-03 LAB
ALBUMIN SERPL-MCNC: 3.3 G/DL (ref 3.5–5.2)
ALBUMIN/GLOB SERPL: 1.4 G/DL
ALP SERPL-CCNC: 65 U/L (ref 39–117)
ALT SERPL W P-5'-P-CCNC: 38 U/L (ref 1–41)
ANION GAP SERPL CALCULATED.3IONS-SCNC: 9 MMOL/L (ref 5–15)
AST SERPL-CCNC: 38 U/L (ref 1–40)
BILIRUB SERPL-MCNC: 0.4 MG/DL (ref 0–1.2)
BUN SERPL-MCNC: 5 MG/DL (ref 6–20)
BUN/CREAT SERPL: 10.2 (ref 7–25)
CALCIUM SPEC-SCNC: 8.4 MG/DL (ref 8.6–10.5)
CHLORIDE SERPL-SCNC: 109 MMOL/L (ref 98–107)
CO2 SERPL-SCNC: 21 MMOL/L (ref 22–29)
CREAT SERPL-MCNC: 0.49 MG/DL (ref 0.76–1.27)
GFR SERPL CREATININE-BSD FRML MDRD: >150 ML/MIN/1.73
GLOBULIN UR ELPH-MCNC: 2.4 GM/DL
GLUCOSE SERPL-MCNC: 89 MG/DL (ref 65–99)
POTASSIUM SERPL-SCNC: 4.1 MMOL/L (ref 3.5–5.2)
PROT SERPL-MCNC: 5.7 G/DL (ref 6–8.5)
SODIUM SERPL-SCNC: 139 MMOL/L (ref 136–145)

## 2020-11-03 PROCEDURE — 99239 HOSP IP/OBS DSCHRG MGMT >30: CPT | Performed by: INTERNAL MEDICINE

## 2020-11-03 PROCEDURE — 25010000002 ENOXAPARIN PER 10 MG: Performed by: NURSE PRACTITIONER

## 2020-11-03 PROCEDURE — 80053 COMPREHEN METABOLIC PANEL: CPT | Performed by: INTERNAL MEDICINE

## 2020-11-03 PROCEDURE — 25010000002 THIAMINE PER 100 MG: Performed by: NURSE PRACTITIONER

## 2020-11-03 RX ORDER — LANOLIN ALCOHOL/MO/W.PET/CERES
100 CREAM (GRAM) TOPICAL DAILY
Qty: 30 TABLET | Refills: 0 | Status: ON HOLD | OUTPATIENT
Start: 2020-11-04 | End: 2023-03-12 | Stop reason: SDUPTHER

## 2020-11-03 RX ORDER — CHLORDIAZEPOXIDE HYDROCHLORIDE 5 MG/1
20 CAPSULE, GELATIN COATED ORAL EVERY 6 HOURS SCHEDULED
Status: DISCONTINUED | OUTPATIENT
Start: 2020-11-03 | End: 2020-11-03 | Stop reason: HOSPADM

## 2020-11-03 RX ORDER — MULTIPLE VITAMINS W/ MINERALS TAB 9MG-400MCG
1 TAB ORAL DAILY
Status: DISCONTINUED | OUTPATIENT
Start: 2020-11-04 | End: 2020-11-03 | Stop reason: HOSPADM

## 2020-11-03 RX ORDER — THIAMINE MONONITRATE (VIT B1) 100 MG
100 TABLET ORAL DAILY
Status: DISCONTINUED | OUTPATIENT
Start: 2020-11-04 | End: 2020-11-03 | Stop reason: HOSPADM

## 2020-11-03 RX ORDER — CHLORDIAZEPOXIDE HYDROCHLORIDE 5 MG/1
CAPSULE, GELATIN COATED ORAL
Qty: 6 CAPSULE | Refills: 0 | Status: SHIPPED | OUTPATIENT
Start: 2020-11-03 | End: 2020-11-12

## 2020-11-03 RX ORDER — FOLIC ACID 1 MG/1
1 TABLET ORAL DAILY
Status: DISCONTINUED | OUTPATIENT
Start: 2020-11-04 | End: 2020-11-03 | Stop reason: HOSPADM

## 2020-11-03 RX ADMIN — Medication 1 PATCH: at 07:22

## 2020-11-03 RX ADMIN — FOLIC ACID 100 ML/HR: 5 INJECTION, SOLUTION INTRAMUSCULAR; INTRAVENOUS; SUBCUTANEOUS at 08:44

## 2020-11-03 RX ADMIN — CHLORDIAZEPOXIDE HYDROCHLORIDE 20 MG: 5 CAPSULE ORAL at 12:19

## 2020-11-03 RX ADMIN — CARVEDILOL 6.25 MG: 3.12 TABLET, FILM COATED ORAL at 08:43

## 2020-11-03 RX ADMIN — CHLORDIAZEPOXIDE HYDROCHLORIDE 25 MG: 25 CAPSULE ORAL at 07:22

## 2020-11-03 RX ADMIN — SODIUM CHLORIDE, PRESERVATIVE FREE 10 ML: 5 INJECTION INTRAVENOUS at 08:44

## 2020-11-03 RX ADMIN — NICOTINE POLACRILEX 2 MG: 2 GUM, CHEWING BUCCAL at 14:17

## 2020-11-03 RX ADMIN — ENOXAPARIN SODIUM 40 MG: 40 INJECTION SUBCUTANEOUS at 07:24

## 2020-11-03 RX ADMIN — CHLORDIAZEPOXIDE HYDROCHLORIDE 25 MG: 25 CAPSULE ORAL at 00:23

## 2020-11-03 NOTE — PROGRESS NOTES
"Continued Stay Note  [facility]     Patient Name: Dung Knight  MRN: [id number]  Today's Date: 11/3/2020    Admit Date: 10/31/2020    Discharge Plan     Row Name 11/03/20 1527       Plan    Plan  Resources provided    Plan Comments  Pt desires to leave the hospital TONIGHT.  He states that [facility] has all of his possessions and that he wants to leave here tonight, go get his belongings and potentially pursue AUD treatment elsewhere.  He is undecided about engaging in AUD treatment, and feels that his detox period is over and he is ready to get out of the hospital.  He is aware of [facility]'s services of PHP and IOP and can pursue them.  We have provided him with a comprehensive treatment packet of resources.  He also  been signed up with Telephone Recovery Support with the [facility]  I have encouraged him to pursue a level of care for AUD.  He remains reluctant to commit to treatment.  Long d/w patient regarding AA and other recovery support meetings.  From our perspective, he is ready for d/c- we do not have anything further to offer him at this time.  Addendum:  Patient states, \"can y'all call for [facility]'s [employer], so they can come and get me and I can get my stuff?\"  I conferred with Vinicio in  about this, and I relayed to the patient that since he was admitted here for medical clearance from [facility], alternative transportation will need to be arranged, especially since he does not desire AUD treatment there.  He stated to me\" tell the nurse to go ahead and get my discharge going.\"  I relayed this to the patient's bedside RN as well.  ETOH last ingestion: prior to admission  ETOH level at admission: 237  ETOH Hx: long standing hx of ETOH use/abuse  ETOH current consumption: he states he has \"slowed down\" now he states he drinks about 6-8 beers per day (this report is incongruent with other entries in the chart)  Previous treatment: [facility] about 7 years ago.  In 2018- he states he " "came into the hospital and then \"sobered-up\"  Longest length of sobriety: 4-6 months  Support System: uncertain- he does state that his wife had a recent stay in [facility] for psychiatric issues- he also stated that he thinks his \"wife might be stealing his stuff while I'm in here\"  Potential Barriers: noncommittal to treatment      Row Name 11/03/20 6096       Plan    Plan  Home    Patient/Family in Agreement with Plan  yes    Plan Comments  Spoke to Danelle in SW and she is not following at this time. Spoke to Sara in substance abuse and she is checking on patient's staus with [facility]. CM will continue to follow.    Final Discharge Disposition Code  01 - home or self-care        Discharge Codes    No documentation.             Sara Daniel RN ,BSN   Addiction Coordinator     "

## 2020-11-03 NOTE — PROGRESS NOTES
Continued Stay Note  [facility]     Patient Name: Dung Knight  MRN: [id number]  Today's Date: 11/3/2020    Admit Date: 10/31/2020    Discharge Plan     Row Name 11/03/20 5442       Plan    Plan Discharge Home    Plan Comments Spoke with patient at bedside. He stated that he is ready to be discharged today. He said that he has declined the Ativan today and he feels as if he is ready to go. He said that he originally went to [facility] for detox and AUD treatment; however, he was almost immediately placed in an ambulance and brought to [facility] for medical clearance. He said that he feels like he can stop drinking on his own, he has done it before. He said that he is considering attending Alcoholic's Anonymous meetings. We discussed [facility] and the recovery support and services they offer. The patient was in agreement to sign up for Telephone Recovery Support and a . I provided him with a list of self-help meetings that are available both online and in person. Also provided him with our contact information for any additional Outreach Services he may need.    Row Name 11/03/20 9547       Plan    Plan  Resources provided    Plan Comments  Pt desires to leave the hospital TONIGHT.  He states that [facility] has all of his possessions and that he wants to leave here tomgiht, go get his belongings and potentially pursue AUD treatment elsewhere.  He is undecided about engaging in AUD treatment, and feels that his detox period is over and he is ready to get out of the hospital.  He is aware of [facility]'s services of PHP and IOP and can pursue them.        Discharge Codes    No documentation.             Kate Ivy     Chemical Dependency Team  Ext. [phone]

## 2020-11-03 NOTE — PROGRESS NOTES
Continued Stay Note  [facility]     Patient Name: Dung Knight  MRN: [id number]  Today's Date: 11/3/2020    Admit Date: 10/31/2020    Discharge Plan     Row Name 11/03/20 1239       Plan    Plan  Home    Patient/Family in Agreement with Plan  yes    Plan Comments  Spoke to Danelle in SW and she is not following at this time. Spoke to Sara in substance abuse and she is checking on patient's staus with [facility]. CM will continue to follow.    Final Discharge Disposition Code  01 - home or self-care        Discharge Codes    No documentation.             Denton Foley RN

## 2020-11-03 NOTE — DISCHARGE SUMMARY
[facility] Medicine Services  DISCHARGE SUMMARY    Patient Name: Dung Knihgt  : 1985  MRN: [id number]    Date of Admission: 10/31/2020  9:38 PM  Date of Discharge:  20  Primary Care Physician: Han Reyes APRN    Consults     No orders found from 10/2/2020 to 2020.          Hospital Course     Presenting Problem:   Alcohol withdrawal syndrome without complication (CMS/HCC) [F10.230]  Alcohol withdrawal syndrome without complication (CMS/HCC) [F10.230]    Active Hospital Problems    Diagnosis  POA   • Alcohol withdrawal syndrome without complication (CMS/HCC) [F10.230]  Yes   • Tobacco use disorder [F17.200]  Yes   • Essential hypertension [I10]  Yes      Resolved Hospital Problems    Diagnosis Date Resolved POA   • **Alcohol withdrawal (CMS/HCC) [F10.239] 2020 Yes   • Elevated liver enzymes [R74.8] 2020 Yes   • Seizure (CMS/HCC) [R56.9] 2020 Yes          Hospital Course:  Dung Knight is a 35 y.o. male with a history of HTN, asthma, seizures, alcohol abuse presents to the ED with complaints of alcohol abuse.  Patient reports that he has been drinking 24 tall boys daily for several months.  His last drink was around 4 PM on 10/31.     Alcohol withdrawal  History of seizure  --CIWA protocol and he was started on librium and rally pack. His librium was weaned and he used no ativan on the day of discharge. Patient met with addiction SW and resources provided to him. He will be discharged on short librium taper and thiamine. He should have close PCP follow up.      Elevated transaminases   -- likely related to ETOH. Improved.      HTN  --Continued Coreg     Tobacco abuse  --smoking cessation education provided       Discharge Follow Up Recommendations for outpatient labs/diagnostics:  PCP Han Reyes 5-7 days     Day of Discharge     HPI:   No acute events. Received 18 mg of ativan yesterday. Today states that he feels ok. Doesn't want to take ativan  and feels that he will be fine. Concerned about his job and getting his stuff from [employer].     Received notification from nursing that patient wanted to leave. Concerned about his job and hasn't needed any ativan so far today. He did meet with Addiction team and resources given.     Review of Systems  Gen- No fevers, chills  CV- No chest pain, palpitations  Resp- No cough, dyspnea  GI- No N/V/D, abd pain      Vital Signs:   Temp:  [97.2 °F (36.2 °C)-97.8 °F (36.6 °C)] 97.5 °F (36.4 °C)  Heart Rate:  [70-96] 82  Resp:  [18] 18  BP: ()/(71-92) 134/92     Physical Exam:  Constitutional: No acute distress, awake, alert  HENT: NCAT, mucous membranes moist  Respiratory: Clear to auscultation bilaterally, respiratory effort normal   Cardiovascular: RRR, no murmurs, rubs, or gallops, palpable pedal pulses bilaterally  Gastrointestinal: Positive bowel sounds, soft, nontender, nondistended  Musculoskeletal: No bilateral ankle edema  Psychiatric: Appropriate affect, cooperative  Neurologic: Oriented x 3, strength symmetric in all extremities, Cranial Nerves grossly intact to confrontation, speech clear  Skin: No rashes    Pertinent  and/or Most Recent Results     Results from last 7 days   Lab Units 11/03/20  0807 11/01/20  1008 10/31/20  2147   WBC 10*3/mm3  --  10.50 10.61   HEMOGLOBIN g/dL  --  16.6 17.4   HEMATOCRIT %  --  50.2 52.6*   PLATELETS 10*3/mm3  --  195 218   SODIUM mmol/L 139 139 141   POTASSIUM mmol/L 4.1 4.6 4.1   CHLORIDE mmol/L 109* 103 105   CO2 mmol/L 21.0* 22.0 21.0*   BUN mg/dL 5* 6 4*   CREATININE mg/dL 0.49* 0.55* 0.55*   GLUCOSE mg/dL 89 70 79   CALCIUM mg/dL 8.4* 8.6 8.6     Results from last 7 days   Lab Units 11/03/20  0807 10/31/20  2147   BILIRUBIN mg/dL 0.4 0.2   ALK PHOS U/L 65 82   ALT (SGPT) U/L 38 48*   AST (SGOT) U/L 38 56*           Invalid input(s): TG, LDLCALC, LDLREALC  Results from last 7 days   Lab Units 11/01/20  1008   TSH uIU/mL 1.270       Brief Urine Lab Results     None           Microbiology Results Abnormal     Procedure Component Value - Date/Time    COVID-19,CEPHEID,STEFAN IN-HOUSE(OR EMERGENT/ADD-ON),NP SWAB IN TRANSPORT MEDIA 3-4 HR TAT - Swab, Nasopharynx [[id number]]  (Normal) Collected: 11/01/20 0106    Lab Status: Final result Specimen: Swab from Nasopharynx Updated: 11/01/20 0418     COVID19 Not Detected    Narrative:      Fact sheet for providers: https://www.fda.gov/media/606625/download     Fact sheet for patients: https://www.fda.gov/media/309993/download          Imaging Results (All)     None                         Plan for Follow-up of Pending Labs/Results:     Discharge Details        Discharge Medications      New Medications      Instructions Start Date   chlordiazePOXIDE 5 MG capsule  Commonly known as: LIBRIUM   Take 1 tablets every 8 hours for 1 day; then take 1 tablet every 12 hours for 1 day; then take 1 tablet at night for 1 day      thiamine 100 MG tablet  Commonly known as: VITAMIN B1   100 mg, Oral, Daily   Start Date: November 4, 2020        Continue These Medications      Instructions Start Date   acetaminophen 650 MG 8 hr tablet  Commonly known as: Tylenol 8 Hour   650 mg, Oral, Every 8 Hours PRN      albuterol sulfate  (90 Base) MCG/ACT inhaler  Commonly known as: PROVENTIL HFA;VENTOLIN HFA;PROAIR HFA   2 puffs, Inhalation, Every 4 Hours PRN      bacitracin-polymyxin b 500-17294 UNIT/GM ointment  Commonly known as: POLYSPORIN   Topical, 2 Times Daily      carvedilol 6.25 MG tablet  Commonly known as: Coreg   6.25 mg, Oral, 2 Times Daily With Meals      folic acid 1 MG tablet  Commonly known as: FOLVITE   1 mg, Oral, Daily      ibuprofen 600 MG tablet  Commonly known as: ADVIL,MOTRIN   600 mg, Oral, Every 8 Hours PRN      multivitamin with minerals tablet tablet   1 tablet, Oral, Daily      ondansetron 4 MG tablet  Commonly known as: ZOFRAN   4 mg, Oral, Every 6 Hours PRN      traZODone 50 MG tablet  Commonly known as: DESYREL   TAKE 1 TABLET BY  MOUTH AT BEDTIME              Allergies   Allergen Reactions   • Penicillins Unknown (See Comments)     Unknown reaction         Discharge Disposition:  Home or Self Care    Diet:  Hospital:  Diet Order   Procedures   • Diet Regular       Activity:  Activity Instructions     Activity as Tolerated            Restrictions or Other Recommendations:         CODE STATUS:    Code Status and Medical Interventions:   Ordered at: 11/01/20 0004     Level Of Support Discussed With:    Patient     Code Status:    CPR     Medical Interventions (Level of Support Prior to Arrest):    Full       No future appointments.    Additional Instructions for the Follow-ups that You Need to Schedule     Discharge Follow-up with PCP   As directed       Currently Documented PCP:    Han Reyes APRN    PCP Phone Number:    [phone]     Follow Up Details: PCP Han Reyes 5 days                     Jacqueline cOhoa DO  11/03/20      Time Spent on Discharge:  I spent  33  minutes on this discharge activity which included: face-to-face encounter with the patient, reviewing the data in the system, coordination of the care with the nursing staff as well as consultants, documentation, and entering orders.

## 2020-11-04 ENCOUNTER — TRANSITIONAL CARE MANAGEMENT TELEPHONE ENCOUNTER (OUTPATIENT)
Dept: CALL CENTER | Facility: HOSPITAL | Age: 35
End: 2020-11-04

## 2020-11-04 NOTE — OUTREACH NOTE
Call Center TCM Note      Responses   [facility] patient discharged from?  [place]   Does the patient have one of the following disease processes/diagnoses(primary or secondary)?  Other   TCM attempt successful?  Yes   Call start time  1016   Call end time  1019   Discharge diagnosis  Alcohol withdrawal syndrome    Meds reviewed with patient/caregiver?  Yes   Is the patient having any side effects they believe may be caused by any medication additions or changes?  No   Does the patient have all medications ordered at discharge?  Yes   Is the patient taking all medications as directed (includes completed medication regime)?  No   What is preventing the patient from taking all medications as directed?  Other [Not picked up at time of call]   Nursing Interventions  Nurse provided patient education   Does the patient have a primary care provider?   Yes   Does the patient have an appointment with their PCP within 7 days of discharge?  Greater than 7 days   Comments regarding PCP  Hospital d/c f/u appt is on 11/11/20 at 9:45 am    What is preventing the patient from scheduling follow up appointments within 7 days of discharge?  -- [unsure]   Nursing Interventions  Verified appointment date/time/provider   Has the patient kept scheduled appointments due by today?  N/A   Psychosocial issues?  No   Did the patient receive a copy of their discharge instructions?  Yes   Nursing interventions  Reviewed instructions with patient   What is the patient's perception of their health status since discharge?  Improving   Is the patient/caregiver able to teach back signs and symptoms related to disease process for when to call PCP?  Yes   Is the patient/caregiver able to teach back signs and symptoms related to disease process for when to call 911?  Yes   Is the patient/caregiver able to teach back the hierarchy of who to call/visit for symptoms/problems? PCP, Specialist, Home health nurse, Urgent Care, ED, 911  Yes   If the  patient is a current smoker, are they able to teach back resources for cessation?  Smoking cessation medications   TCM call completed?  Yes          Kayla Ivy RN    11/4/2020, 10:19 EST

## 2020-11-04 NOTE — OUTREACH NOTE
Prep Survey      Responses   [facility] patient discharged from?  [place]   Is LACE score < 7 ?  No   Eligibility  [facility]   Date of Admission  10/31/20   Date of Discharge  11/03/20   Discharge Disposition  Home or Self Care   Discharge diagnosis  Alcohol withdrawal syndrome    Does the patient have one of the following disease processes/diagnoses(primary or secondary)?  Other   Does the patient have Home health ordered?  No   Is there a DME ordered?  No   Prep survey completed?  Yes          Patricia Smiley RN

## 2020-11-12 ENCOUNTER — READMISSION MANAGEMENT (OUTPATIENT)
Dept: CALL CENTER | Facility: HOSPITAL | Age: 35
End: 2020-11-12

## 2020-11-12 ENCOUNTER — TELEMEDICINE (OUTPATIENT)
Dept: FAMILY MEDICINE CLINIC | Facility: CLINIC | Age: 35
End: 2020-11-12

## 2020-11-12 DIAGNOSIS — I10 ESSENTIAL HYPERTENSION: Primary | ICD-10-CM

## 2020-11-12 DIAGNOSIS — K21.9 GASTROESOPHAGEAL REFLUX DISEASE, UNSPECIFIED WHETHER ESOPHAGITIS PRESENT: ICD-10-CM

## 2020-11-12 DIAGNOSIS — G47.00 INSOMNIA, UNSPECIFIED TYPE: ICD-10-CM

## 2020-11-12 DIAGNOSIS — F41.9 ANXIETY: ICD-10-CM

## 2020-11-12 DIAGNOSIS — R74.8 ELEVATED LIVER ENZYMES: ICD-10-CM

## 2020-11-12 PROCEDURE — 99213 OFFICE O/P EST LOW 20 MIN: CPT | Performed by: NURSE PRACTITIONER

## 2020-11-12 RX ORDER — QUETIAPINE FUMARATE 50 MG/1
50 TABLET, FILM COATED ORAL NIGHTLY
Qty: 30 TABLET | Refills: 1 | Status: SHIPPED | OUTPATIENT
Start: 2020-11-12 | End: 2021-01-22

## 2020-11-12 RX ORDER — OMEPRAZOLE 20 MG/1
20 CAPSULE, DELAYED RELEASE ORAL DAILY
Start: 2020-11-12 | End: 2023-03-16 | Stop reason: SDUPTHER

## 2020-11-12 RX ORDER — CARVEDILOL 6.25 MG/1
6.25 TABLET ORAL 2 TIMES DAILY WITH MEALS
Qty: 60 TABLET | Refills: 5 | Status: SHIPPED | OUTPATIENT
Start: 2020-11-12

## 2020-11-12 NOTE — PROGRESS NOTES
Chief Complaint   Patient presents with   • Anxiety   • Hypertension       Subjective   Dung Knight is a 35 y.o. male    History of Present Illness  Date of Admission: 10/31/2020  9:38 PM  Date of Discharge:  11/03/20  Presenting Problem:   Alcohol withdrawal syndrome without complication (CMS/HCC) [F10.230]  Alcohol withdrawal syndrome without complication (CMS/HCC) [F10.230]           Active Hospital Problems     Diagnosis   POA   • Alcohol withdrawal syndrome without complication (CMS/HCC) [F10.230]   Yes   • Tobacco use disorder [F17.200]   Yes   • Essential hypertension [I10]   Yes       Resolved Hospital Problems     Diagnosis Date Resolved POA   • **Alcohol withdrawal (CMS/HCC) [F10.239] 11/03/2020 Yes   • Elevated liver enzymes [R74.8] 11/03/2020 Yes   • Seizure (CMS/HCC) [R56.9] 11/03/2020 Yes            Hospital Course:  Dung Knight is a 35 y.o. male with a history of HTN, asthma, seizures, alcohol abuse presents to the ED with complaints of alcohol abuse.  Patient reports that he has been drinking 24 tall boys daily for several months.  His last drink was around 4 PM on 10/31.     Alcohol withdrawal  History of seizure  --CIWA protocol and he was started on librium and rally pack. His librium was weaned and he used no ativan on the day of discharge. Patient met with addiction SW and resources provided to him. He will be discharged on short librium taper and thiamine. He should have close PCP follow up.      Elevated transaminases   -- likely related to ETOH. Improved.      HTN  --Continued Coreg     Tobacco abuse  --smoking cessation education provided         Discharge Follow Up Recommendations for outpatient labs/diagnostics:  PCP Han Reyes 5-7 days        11/12/2020- Patient on video visit today via [employer] after being hospitalized at [facility] on 10/31 through 11/ 3 with alcohol withdrawal as noted above.  He does get permission to be on a video visit.  He does state that since being out of  "the hospital he still has some lethargy and multiple panic anxiety episodes.  For anxiety issues in the past he has taken Zoloft, BuSpar, Paxil, Valium, Librium and Ativan.  Some of these medications worked well others did not work at all such as Zoloft and Paxil.  He does have an appointment upcoming to see psychiatry.  He reports he has drank no alcohol since being out of the hospital but does continue to smoke and in fact is smoking a cigarette as where visiting.  He is taking a multiple vitamin.  He states he is having great difficulty sleeping and has taken some Benadryl over-the-counter which really has not helped.  He has taken trazodone in the past which does help some.  He also is taking Seroquel in the past which did help some.  He does have frequent headaches and has a history of a \" shattered hand\" and uses [employer] powders for this.  He also has lots of history of gastrointestinal ailments and thinks he may have irritable bowel or celiac disease.  He does have diarrhea 3-4 times per day and reports he had his first colonoscopy at age 19.  He also has a long-term history of high blood pressure reports every time his blood pressures checked and these were in the hospital or at physician's office is at high.  He is not taking carvedilol 6.25 mg twice a day as has been ordered.  He also does not check his blood pressure at home.    Allergies   Allergen Reactions   • Penicillins Unknown (See Comments)     Unknown reaction     Past Medical History:   Diagnosis Date   • Asthma    • Hypertension    • Scoliosis    • Seizures (CMS/HCC)       Past Surgical History:   Procedure Laterality Date   • COLONOSCOPY     • HERNIA REPAIR     • TESTICLE SURGERY       Social History     Socioeconomic History   • Marital status:      Spouse name: Not on file   • Number of children: Not on file   • Years of education: Not on file   • Highest education level: Not on file   Tobacco Use   • Smoking status: Current Every Day " Smoker     Packs/day: 1.00     Types: Cigarettes     Start date: 2/7/2004   • Smokeless tobacco: Never Used   Substance and Sexual Activity   • Alcohol use: Yes     Alcohol/week: 21.0 standard drinks     Types: 21 Cans of beer per week   • Drug use: Not Currently     Types: Marijuana   • Sexual activity: Defer        Current Outpatient Medications:   •  acetaminophen (TYLENOL 8 HOUR) 650 MG 8 hr tablet, Take 1 tablet by mouth Every 8 (Eight) Hours As Needed for Mild Pain ., Disp: 12 tablet, Rfl: 0  •  albuterol sulfate  (90 Base) MCG/ACT inhaler, Inhale 2 puffs Every 4 (Four) Hours As Needed for Wheezing., Disp: , Rfl:   •  bacitracin-polymyxin b (POLYSPORIN) 500-21664 UNIT/GM ointment, Apply  topically to the appropriate area as directed 2 (Two) Times a Day., Disp: 15 g, Rfl: 0  •  carvedilol (Coreg) 6.25 MG tablet, Take 1 tablet by mouth 2 (Two) Times a Day With Meals., Disp: 60 tablet, Rfl: 5  •  folic acid (FOLVITE) 1 MG tablet, Take 1 tablet by mouth Daily., Disp: 30 tablet, Rfl: 0  •  ibuprofen (ADVIL,MOTRIN) 600 MG tablet, Take 1 tablet by mouth Every 8 (Eight) Hours As Needed for Mild Pain ., Disp: 12 tablet, Rfl: 0  •  Multiple Vitamins-Minerals (MULTIVITAMIN WITH MINERALS) tablet tablet, Take 1 tablet by mouth Daily., Disp: 30 tablet, Rfl: 0  •  omeprazole (PrilOSEC) 20 MG capsule, Take 1 capsule by mouth Daily., Disp:  , Rfl:   •  ondansetron (ZOFRAN) 4 MG tablet, Take 1 tablet by mouth Every 6 (Six) Hours As Needed for Nausea., Disp: 12 tablet, Rfl: 0  •  QUEtiapine (SEROquel) 50 MG tablet, Take 1 tablet by mouth Every Night., Disp: 30 tablet, Rfl: 1  •  thiamine (VITAMIN B1) 100 MG tablet, Take 1 tablet by mouth Daily., Disp: 30 tablet, Rfl: 0  •  traZODone (DESYREL) 50 MG tablet, TAKE 1 TABLET BY MOUTH AT BEDTIME , Disp: 3 tablet, Rfl: 2     Review of Systems   Constitutional: Negative for chills, fatigue, fever and unexpected weight change.   Respiratory: Negative for cough, chest tightness,  shortness of breath and wheezing.    Cardiovascular: Positive for chest pain (with panic). Negative for palpitations and leg swelling.   Gastrointestinal: Positive for diarrhea. Negative for constipation, nausea and vomiting.   Genitourinary: Negative for difficulty urinating and dysuria.   Musculoskeletal: Positive for arthralgias and back pain.   Skin: Negative for color change and rash.   Neurological: Positive for headaches. Negative for dizziness, syncope and weakness.   Psychiatric/Behavioral: Positive for sleep disturbance. The patient is nervous/anxious.        Objective     There were no vitals filed for this visit.    Results for orders placed or performed during the hospital encounter of 10/31/20   COVID-19,CEPHEID,STEFAN IN-HOUSE(OR EMERGENT/ADD-ON),NP SWAB IN TRANSPORT MEDIA 3-4 HR TAT - Swab, Nasopharynx    Specimen: Nasopharynx; Swab   Result Value Ref Range    COVID19 Not Detected Not Detected - Ref. Range   Comprehensive Metabolic Panel    Specimen: Blood   Result Value Ref Range    Glucose 79 65 - 99 mg/dL    BUN 4 (L) 6 - 20 mg/dL    Creatinine 0.55 (L) 0.76 - 1.27 mg/dL    Sodium 141 136 - 145 mmol/L    Potassium 4.1 3.5 - 5.2 mmol/L    Chloride 105 98 - 107 mmol/L    CO2 21.0 (L) 22.0 - 29.0 mmol/L    Calcium 8.6 8.6 - 10.5 mg/dL    Total Protein 7.0 6.0 - 8.5 g/dL    Albumin 4.30 3.50 - 5.20 g/dL    ALT (SGPT) 48 (H) 1 - 41 U/L    AST (SGOT) 56 (H) 1 - 40 U/L    Alkaline Phosphatase 82 39 - 117 U/L    Total Bilirubin 0.2 0.0 - 1.2 mg/dL    eGFR Non African Amer >150 >60 mL/min/1.73    Globulin 2.7 gm/dL    A/G Ratio 1.6 g/dL    BUN/Creatinine Ratio 7.3 7.0 - 25.0    Anion Gap 15.0 5.0 - 15.0 mmol/L   Urine Drug Screen - Urine, Clean Catch    Specimen: Urine, Clean Catch   Result Value Ref Range    THC, Screen, Urine Negative Negative    Phencyclidine (PCP), Urine Negative Negative    Cocaine Screen, Urine Negative Negative    Methamphetamine, Ur Negative Negative    Opiate Screen Negative Negative     Amphetamine Screen, Urine Negative Negative    Benzodiazepine Screen, Urine Negative Negative    Tricyclic Antidepressants Screen Negative Negative    Methadone Screen, Urine Negative Negative    Barbiturates Screen, Urine Negative Negative    Oxycodone Screen, Urine Negative Negative    Propoxyphene Screen Negative Negative    Buprenorphine, Screen, Urine Negative Negative   Ethanol    Specimen: Blood   Result Value Ref Range    Ethanol 237 (H) 0 - 10 mg/dL   CBC Auto Differential    Specimen: Blood   Result Value Ref Range    WBC 10.61 3.40 - 10.80 10*3/mm3    RBC 5.42 4.14 - 5.80 10*6/mm3    Hemoglobin 17.4 13.0 - 17.7 g/dL    Hematocrit 52.6 (H) 37.5 - 51.0 %    MCV 97.0 79.0 - 97.0 fL    MCH 32.1 26.6 - 33.0 pg    MCHC 33.1 31.5 - 35.7 g/dL    RDW 15.5 (H) 12.3 - 15.4 %    RDW-SD 54.6 (H) 37.0 - 54.0 fl    MPV 11.5 6.0 - 12.0 fL    Platelets 218 140 - 450 10*3/mm3    Neutrophil % 53.2 42.7 - 76.0 %    Lymphocyte % 30.9 19.6 - 45.3 %    Monocyte % 11.8 5.0 - 12.0 %    Eosinophil % 1.9 0.3 - 6.2 %    Basophil % 1.5 0.0 - 1.5 %    Immature Grans % 0.7 (H) 0.0 - 0.5 %    Neutrophils, Absolute 5.65 1.70 - 7.00 10*3/mm3    Lymphocytes, Absolute 3.28 (H) 0.70 - 3.10 10*3/mm3    Monocytes, Absolute 1.25 (H) 0.10 - 0.90 10*3/mm3    Eosinophils, Absolute 0.20 0.00 - 0.40 10*3/mm3    Basophils, Absolute 0.16 0.00 - 0.20 10*3/mm3    Immature Grans, Absolute 0.07 (H) 0.00 - 0.05 10*3/mm3    nRBC 0.0 0.0 - 0.2 /100 WBC   Basic Metabolic Panel    Specimen: Blood   Result Value Ref Range    Glucose 70 65 - 99 mg/dL    BUN 6 6 - 20 mg/dL    Creatinine 0.55 (L) 0.76 - 1.27 mg/dL    Sodium 139 136 - 145 mmol/L    Potassium 4.6 3.5 - 5.2 mmol/L    Chloride 103 98 - 107 mmol/L    CO2 22.0 22.0 - 29.0 mmol/L    Calcium 8.6 8.6 - 10.5 mg/dL    eGFR Non African Amer >150 >60 mL/min/1.73    BUN/Creatinine Ratio 10.9 7.0 - 25.0    Anion Gap 14.0 5.0 - 15.0 mmol/L   CBC (No Diff)    Specimen: Blood   Result Value Ref Range    WBC  10.50 3.40 - 10.80 10*3/mm3    RBC 5.06 4.14 - 5.80 10*6/mm3    Hemoglobin 16.6 13.0 - 17.7 g/dL    Hematocrit 50.2 37.5 - 51.0 %    MCV 99.2 (H) 79.0 - 97.0 fL    MCH 32.8 26.6 - 33.0 pg    MCHC 33.1 31.5 - 35.7 g/dL    RDW 15.3 12.3 - 15.4 %    RDW-SD 56.3 (H) 37.0 - 54.0 fl    MPV 10.9 6.0 - 12.0 fL    Platelets 195 140 - 450 10*3/mm3   TSH    Specimen: Blood   Result Value Ref Range    TSH 1.270 0.270 - 4.200 uIU/mL   Magnesium    Specimen: Blood   Result Value Ref Range    Magnesium 2.1 1.6 - 2.6 mg/dL   Comprehensive Metabolic Panel    Specimen: Blood   Result Value Ref Range    Glucose 89 65 - 99 mg/dL    BUN 5 (L) 6 - 20 mg/dL    Creatinine 0.49 (L) 0.76 - 1.27 mg/dL    Sodium 139 136 - 145 mmol/L    Potassium 4.1 3.5 - 5.2 mmol/L    Chloride 109 (H) 98 - 107 mmol/L    CO2 21.0 (L) 22.0 - 29.0 mmol/L    Calcium 8.4 (L) 8.6 - 10.5 mg/dL    Total Protein 5.7 (L) 6.0 - 8.5 g/dL    Albumin 3.30 (L) 3.50 - 5.20 g/dL    ALT (SGPT) 38 1 - 41 U/L    AST (SGOT) 38 1 - 40 U/L    Alkaline Phosphatase 65 39 - 117 U/L    Total Bilirubin 0.4 0.0 - 1.2 mg/dL    eGFR Non African Amer >150 >60 mL/min/1.73    Globulin 2.4 gm/dL    A/G Ratio 1.4 g/dL    BUN/Creatinine Ratio 10.2 7.0 - 25.0    Anion Gap 9.0 5.0 - 15.0 mmol/L   Light Blue Top   Result Value Ref Range    Extra Tube hold for add-on    Green Top (Gel)   Result Value Ref Range    Extra Tube Hold for add-ons.    Lavender Top   Result Value Ref Range    Extra Tube hold for add-on    Gold Top - SST   Result Value Ref Range    Extra Tube Hold for add-ons.        Physical Exam  Constitutional:       Appearance: Normal appearance.   Eyes:      Conjunctiva/sclera: Conjunctivae normal.   Pulmonary:      Effort: Pulmonary effort is normal.   Neurological:      Mental Status: He is alert.   Psychiatric:         Mood and Affect: Mood normal.         Behavior: Behavior normal.         Assessment/Plan   Problems Addressed this Visit        Cardiovascular and Mediastinum     Essential hypertension - Primary    Relevant Medications    carvedilol (Coreg) 6.25 MG tablet    Other Relevant Orders    Comprehensive Metabolic Panel       Other    Anxiety    Relevant Medications    QUEtiapine (SEROquel) 50 MG tablet    Insomnia    Relevant Medications    QUEtiapine (SEROquel) 50 MG tablet      Other Visit Diagnoses     Gastroesophageal reflux disease, unspecified whether esophagitis present        Relevant Medications    omeprazole (PrilOSEC) 20 MG capsule    Elevated liver enzymes        Relevant Orders    Comprehensive Metabolic Panel      Diagnoses       Codes Comments    Essential hypertension    -  Primary ICD-10-CM: I10  ICD-9-CM: 401.9     Insomnia, unspecified type     ICD-10-CM: G47.00  ICD-9-CM: 780.52     Anxiety     ICD-10-CM: F41.9  ICD-9-CM: 300.00     Gastroesophageal reflux disease, unspecified whether esophagitis present     ICD-10-CM: K21.9  ICD-9-CM: 530.81     Elevated liver enzymes     ICD-10-CM: R74.8  ICD-9-CM: 790.5       For his blood pressure we will restart him back on the carvedilol 6.25 mg twice a day.  I have sent this to his pharmacy.  I am also going to check a CMP as his liver function tests were elevated and kidney function tests were abnormal and the last time in the hospital.  He reports he does have a blood pressure cuff at home and I recommend check his blood pressure at least once or twice a day and record this for the next visit.  For anxiety and sleep, no change his trazodone to Seroquel 50 mg daily.  If this works for him we may try to make this twice a day.  He does not appointment with psychiatry upcoming.    He does report he has lots of GERD and currently does take omeprazole over-the-counter.  I recommend he continue this medication as it seems to be working per his statement.    RV- 2 weeks    Time on visit 20 minutes    Counseling provided on GERD and hypertension.    Han Reyes, APRN   11/12/2020   15:29 EST

## 2020-11-12 NOTE — PATIENT INSTRUCTIONS
"Managing Your Hypertension  Hypertension is commonly called high blood pressure. This is when the force of your blood pressing against the walls of your arteries is too strong. Arteries are blood vessels that carry blood from your heart throughout your body. Hypertension forces the heart to work harder to pump blood, and may cause the arteries to become narrow or stiff. Having untreated or uncontrolled hypertension can cause heart attack, stroke, kidney disease, and other problems.  What are blood pressure readings?  A blood pressure reading consists of a higher number over a lower number. Ideally, your blood pressure should be below 120/80. The first (\"top\") number is called the systolic pressure. It is a measure of the pressure in your arteries as your heart beats. The second (\"bottom\") number is called the diastolic pressure. It is a measure of the pressure in your arteries as the heart relaxes.  What does my blood pressure reading mean?  Blood pressure is classified into four stages. Based on your blood pressure reading, your health care provider may use the following stages to determine what type of treatment you need, if any. Systolic pressure and diastolic pressure are measured in a unit called mm Hg.  Normal  · Systolic pressure: below 120.  · Diastolic pressure: below 80.  Elevated  · Systolic pressure: 120-129.  · Diastolic pressure: below 80.  Hypertension stage 1  · Systolic pressure: 130-139.  · Diastolic pressure: 80-89.  Hypertension stage 2  · Systolic pressure: 140 or above.  · Diastolic pressure: 90 or above.  What health risks are associated with hypertension?  Managing your hypertension is an important responsibility. Uncontrolled hypertension can lead to:  · A heart attack.  · A stroke.  · A weakened blood vessel (aneurysm).  · Heart failure.  · Kidney damage.  · Eye damage.  · Metabolic syndrome.  · Memory and concentration problems.  What changes can I make to manage my " hypertension?  Hypertension can be managed by making lifestyle changes and possibly by taking medicines. Your health care provider will help you make a plan to bring your blood pressure within a normal range.  Eating and drinking    · Eat a diet that is high in fiber and potassium, and low in salt (sodium), added sugar, and fat. An example eating plan is called the DASH (Dietary Approaches to Stop Hypertension) diet. To eat this way:  ? Eat plenty of fresh fruits and vegetables. Try to fill half of your plate at each meal with fruits and vegetables.  ? Eat whole grains, such as whole wheat pasta, brown rice, or whole grain bread. Fill about one quarter of your plate with whole grains.  ? Eat low-fat diary products.  ? Avoid fatty cuts of meat, processed or cured meats, and poultry with skin. Fill about one quarter of your plate with lean proteins such as fish, chicken without skin, beans, eggs, and tofu.  ? Avoid premade and processed foods. These tend to be higher in sodium, added sugar, and fat.  · Reduce your daily sodium intake. Most people with hypertension should eat less than 1,500 mg of sodium a day.  · Limit alcohol intake to no more than 1 drink a day for nonpregnant women and 2 drinks a day for men. One drink equals 12 oz of beer, 5 oz of wine, or 1½ oz of hard liquor.  Lifestyle  · Work with your health care provider to maintain a healthy body weight, or to lose weight. Ask what an ideal weight is for you.  · Get at least 30 minutes of exercise that causes your heart to beat faster (aerobic exercise) most days of the week. Activities may include walking, swimming, or biking.  · Include exercise to strengthen your muscles (resistance exercise), such as weight lifting, as part of your weekly exercise routine. Try to do these types of exercises for 30 minutes at least 3 days a week.  · Do not use any products that contain nicotine or tobacco, such as cigarettes and e-cigarettes. If you need help quitting,  ask your health care provider.  · Control any long-term (chronic) conditions you have, such as high cholesterol or diabetes.  Monitoring  · Monitor your blood pressure at home as told by your health care provider. Your personal target blood pressure may vary depending on your medical conditions, your age, and other factors.  · Have your blood pressure checked regularly, as often as told by your health care provider.  Working with your health care provider  · Review all the medicines you take with your health care provider because there may be side effects or interactions.  · Talk with your health care provider about your diet, exercise habits, and other lifestyle factors that may be contributing to hypertension.  · Visit your health care provider regularly. Your health care provider can help you create and adjust your plan for managing hypertension.  Will I need medicine to control my blood pressure?  Your health care provider may prescribe medicine if lifestyle changes are not enough to get your blood pressure under control, and if:  · Your systolic blood pressure is 130 or higher.  · Your diastolic blood pressure is 80 or higher.  Take medicines only as told by your health care provider. Follow the directions carefully. Blood pressure medicines must be taken as prescribed. The medicine does not work as well when you skip doses. Skipping doses also puts you at risk for problems.  Contact a health care provider if:  · You think you are having a reaction to medicines you have taken.  · You have repeated (recurrent) headaches.  · You feel dizzy.  · You have swelling in your ankles.  · You have trouble with your vision.  Get help right away if:  · You develop a severe headache or confusion.  · You have unusual weakness or numbness, or you feel faint.  · You have severe pain in your chest or abdomen.  · You vomit repeatedly.  · You have trouble breathing.  Summary  · Hypertension is when the force of blood pumping  "through your arteries is too strong. If this condition is not controlled, it may put you at risk for serious complications.  · Your personal target blood pressure may vary depending on your medical conditions, your age, and other factors. For most people, a normal blood pressure is less than 120/80.  · Hypertension is managed by lifestyle changes, medicines, or both. Lifestyle changes include weight loss, eating a healthy, low-sodium diet, exercising more, and limiting alcohol.  This information is not intended to replace advice given to you by your health care provider. Make sure you discuss any questions you have with your health care provider.  Document Released: 09/11/2013 Document Revised: 04/10/2020 Document Reviewed: 11/15/2017  [employer] Patient Education © 2020 [employer]      DASH Eating Plan  DASH stands for \"Dietary Approaches to Stop Hypertension.\" The DASH eating plan is a healthy eating plan that has been shown to reduce high blood pressure (hypertension). It may also reduce your risk for type 2 diabetes, heart disease, and stroke. The DASH eating plan may also help with weight loss.  What are tips for following this plan?    General guidelines  · Avoid eating more than 2,300 mg (milligrams) of salt (sodium) a day. If you have hypertension, you may need to reduce your sodium intake to 1,500 mg a day.  · Limit alcohol intake to no more than 1 drink a day for nonpregnant women and 2 drinks a day for men. One drink equals 12 oz of beer, 5 oz of wine, or 1½ oz of hard liquor.  · Work with your health care provider to maintain a healthy body weight or to lose weight. Ask what an ideal weight is for you.  · Get at least 30 minutes of exercise that causes your heart to beat faster (aerobic exercise) most days of the week. Activities may include walking, swimming, or biking.  · Work with your health care provider or diet and nutrition specialist (dietitian) to adjust your eating plan to your individual " "calorie needs.  Reading food labels    · Check food labels for the amount of sodium per serving. Choose foods with less than 5 percent of the Daily Value of sodium. Generally, foods with less than 300 mg of sodium per serving fit into this eating plan.  · To find whole grains, look for the word \"whole\" as the first word in the ingredient list.  Shopping  · Buy products labeled as \"low-sodium\" or \"no salt added.\"  · Buy fresh foods. Avoid canned foods and premade or frozen meals.  Cooking  · Avoid adding salt when cooking. Use salt-free seasonings or herbs instead of table salt or sea salt. Check with your health care provider or pharmacist before using salt substitutes.  · Do not hernandez foods. Cook foods using healthy methods such as baking, boiling, grilling, and broiling instead.  · Cook with heart-healthy oils, such as olive, canola, soybean, or sunflower oil.  Meal planning  · Eat a balanced diet that includes:  ? 5 or more servings of fruits and vegetables each day. At each meal, try to fill half of your plate with fruits and vegetables.  ? Up to 6-8 servings of whole grains each day.  ? Less than 6 oz of lean meat, poultry, or fish each day. A 3-oz serving of meat is about the same size as a deck of cards. One egg equals 1 oz.  ? 2 servings of low-fat dairy each day.  ? A serving of nuts, seeds, or beans 5 times each week.  ? Heart-healthy fats. Healthy fats called Omega-3 fatty acids are found in foods such as flaxseeds and coldwater fish, like sardines, salmon, and mackerel.  · Limit how much you eat of the following:  ? Canned or prepackaged foods.  ? Food that is high in trans fat, such as fried foods.  ? Food that is high in saturated fat, such as fatty meat.  ? Sweets, desserts, sugary drinks, and other foods with added sugar.  ? Full-fat dairy products.  · Do not salt foods before eating.  · Try to eat at least 2 vegetarian meals each week.  · Eat more home-cooked food and less restaurant, buffet, and fast " food.  · When eating at a restaurant, ask that your food be prepared with less salt or no salt, if possible.  What foods are recommended?  The items listed may not be a complete list. Talk with your dietitian about what dietary choices are best for you.  Grains  Whole-grain or whole-wheat bread. Whole-grain or whole-wheat pasta. Brown rice. Oatmeal. Quinoa. Bulgur. Whole-grain and low-sodium cereals. Myriam bread. Low-fat, low-sodium crackers. Whole-wheat flour tortillas.  Vegetables  Fresh or frozen vegetables (raw, steamed, roasted, or grilled). Low-sodium or reduced-sodium tomato and vegetable juice. Low-sodium or reduced-sodium tomato sauce and tomato paste. Low-sodium or reduced-sodium canned vegetables.  Fruits  All fresh, dried, or frozen fruit. Canned fruit in natural juice (without added sugar).  Meat and other protein foods  Skinless chicken or turkey. Ground chicken or turkey. Pork with fat trimmed off. Fish and seafood. Egg whites. Dried beans, peas, or lentils. Unsalted nuts, nut butters, and seeds. Unsalted canned beans. Lean cuts of beef with fat trimmed off. Low-sodium, lean deli meat.  Dairy  Low-fat (1%) or fat-free (skim) milk. Fat-free, low-fat, or reduced-fat cheeses. Nonfat, low-sodium ricotta or cottage cheese. Low-fat or nonfat yogurt. Low-fat, low-sodium cheese.  Fats and oils  Soft margarine without trans fats. Vegetable oil. Low-fat, reduced-fat, or light mayonnaise and salad dressings (reduced-sodium). Canola, safflower, olive, soybean, and sunflower oils. Avocado.  Seasoning and other foods  Herbs. Spices. Seasoning mixes without salt. Unsalted popcorn and pretzels. Fat-free sweets.  What foods are not recommended?  The items listed may not be a complete list. Talk with your dietitian about what dietary choices are best for you.  Grains  Baked goods made with fat, such as croissants, muffins, or some breads. Dry pasta or rice meal packs.  Vegetables  Creamed or fried vegetables. Vegetables  in a cheese sauce. Regular canned vegetables (not low-sodium or reduced-sodium). Regular canned tomato sauce and paste (not low-sodium or reduced-sodium). Regular tomato and vegetable juice (not low-sodium or reduced-sodium). Pickles. Olives.  Fruits  Canned fruit in a light or heavy syrup. Fried fruit. Fruit in cream or butter sauce.  Meat and other protein foods  Fatty cuts of meat. Ribs. Fried meat. Rivera. Sausage. Bologna and other processed lunch meats. Salami. Fatback. Hotdogs. Bratwurst. Salted nuts and seeds. Canned beans with added salt. Canned or smoked fish. Whole eggs or egg yolks. Chicken or turkey with skin.  Dairy  Whole or 2% milk, cream, and half-and-half. Whole or full-fat cream cheese. Whole-fat or sweetened yogurt. Full-fat cheese. Nondairy creamers. Whipped toppings. Processed cheese and cheese spreads.  Fats and oils  Butter. Stick margarine. Lard. Shortening. Ghee. Rivera fat. Tropical oils, such as coconut, palm kernel, or palm oil.  Seasoning and other foods  Salted popcorn and pretzels. Onion salt, garlic salt, seasoned salt, table salt, and sea salt. Worcestershire sauce. Tartar sauce. Barbecue sauce. Teriyaki sauce. Soy sauce, including reduced-sodium. Steak sauce. Canned and packaged gravies. Fish sauce. Oyster sauce. Cocktail sauce. Horseradish that you find on the shelf. Ketchup. Mustard. Meat flavorings and tenderizers. Bouillon cubes. Hot sauce and Tabasco sauce. Premade or packaged marinades. Premade or packaged taco seasonings. Relishes. Regular salad dressings.  Where to find more information:  · National Heart, Lung, and [facility] [place]: www.nhlbi.nih.gov  · American Heart Association: www.heart.org  Summary  · The DASH eating plan is a healthy eating plan that has been shown to reduce high blood pressure (hypertension). It may also reduce your risk for type 2 diabetes, heart disease, and stroke.  · With the DASH eating plan, you should limit salt (sodium) intake to 2,300 mg a  day. If you have hypertension, you may need to reduce your sodium intake to 1,500 mg a day.  · When on the DASH eating plan, aim to eat more fresh fruits and vegetables, whole grains, lean proteins, low-fat dairy, and heart-healthy fats.  · Work with your health care provider or diet and nutrition specialist (dietitian) to adjust your eating plan to your individual calorie needs.  This information is not intended to replace advice given to you by your health care provider. Make sure you discuss any questions you have with your health care provider.  Document Released: 12/06/2012 Document Revised: 11/30/2018 Document Reviewed: 12/11/2017  [employer] Patient Education © 2020 [employer]      Food Choices for Gastroesophageal Reflux Disease, Adult  When you have gastroesophageal reflux disease (GERD), the foods you eat and your eating habits are very important. Choosing the right foods can help ease your discomfort. Think about working with a nutrition specialist (dietitian) to help you make good choices.  What are tips for following this plan?    Meals  · Choose healthy foods that are low in fat, such as fruits, vegetables, whole grains, low-fat dairy products, and lean meat, fish, and poultry.  · Eat small meals often instead of 3 large meals a day. Eat your meals slowly, and in a place where you are relaxed. Avoid bending over or lying down until 2-3 hours after eating.  · Avoid eating meals 2-3 hours before bed.  · Avoid drinking a lot of liquid with meals.  · Cook foods using methods other than frying. Bake, grill, or broil food instead.  · Avoid or limit:  ? Chocolate.  ? Peppermint or spearmint.  ? Alcohol.  ? Pepper.  ? Black and decaffeinated coffee.  ? Black and decaffeinated tea.  ? Bubbly (carbonated) soft drinks.  ? Caffeinated energy drinks and soft drinks.  · Limit high-fat foods such as:  ? Fatty meat or fried foods.  ? Whole milk, cream, butter, or ice cream.  ? Nuts and nut butters.  ? Pastries,  donuts, and sweets made with butter or shortening.  · Avoid foods that cause symptoms. These foods may be different for everyone. Common foods that cause symptoms include:  ? Tomatoes.  ? Oranges, june, and limes.  ? Peppers.  ? Spicy food.  ? Onions and garlic.  ? Vinegar.  Lifestyle  · Maintain a healthy weight. Ask your doctor what weight is healthy for you. If you need to lose weight, work with your doctor to do so safely.  · Exercise for at least 30 minutes for 5 or more days each week, or as told by your doctor.  · Wear loose-fitting clothes.  · Do not smoke. If you need help quitting, ask your doctor.  · Sleep with the head of your bed higher than your feet. Use a wedge under the mattress or blocks under the bed frame to raise the head of the bed.  Summary  · When you have gastroesophageal reflux disease (GERD), food and lifestyle choices are very important in easing your symptoms.  · Eat small meals often instead of 3 large meals a day. Eat your meals slowly, and in a place where you are relaxed.  · Limit high-fat foods such as fatty meat or fried foods.  · Avoid bending over or lying down until 2-3 hours after eating.  · Avoid peppermint and spearmint, caffeine, alcohol, and chocolate.  This information is not intended to replace advice given to you by your health care provider. Make sure you discuss any questions you have with your health care provider.  Document Released: 06/18/2013 Document Revised: 04/09/2020 Document Reviewed: 01/23/2018  [employer] Patient Education © 2020 [employer]      Recovering From Addiction  Addiction is a complex disease of the brain. It causes an uncontrollable (compulsive) need for a substance. You can be addicted to substances including alcohol, tobacco, illegal drugs, or prescription medicines such as painkillers. Addiction can change the way that your brain works. It affects memory, behavior, and how you make decisions.  Without treatment, addiction can get worse.  However, with treatment and lifestyle changes, you can recover from addiction.  What types of treatment are available?  The treatment program that is right for you will depend on many factors, including the type of addiction that you have. Treatment programs can be outpatient or inpatient. In an outpatient program, you live at home and go to work or school, but you also go to a clinic for treatment. With an inpatient program, you live and sleep at the program facility during treatment.  Other treatment options include:  · Medicine.  ? Some addictions may be treated with prescription medicines.  ? You may also need medicine to treat other mental health conditions such as anxiety or depression.  · Counseling and behavior therapy. Therapy can help you learn new ways to respond to situations that are stressful or that tempt you to use the addictive substance.  · Support groups. These include therapy groups and 12-step programs. These can help individuals and families during treatment and recovery. Examples of 12-step programs are Alcoholics Anonymous (AA) and Narcotics Anonymous (NA).  How to manage lifestyle changes  Managing stress  Too much stress can lead to returning to the addiction (having a relapse). You need to find effective ways to manage your stress. Some techniques to cope with stress include:  · Meditation, yoga, or deep breathing.  · Exercise. Create an exercise routine that you enjoy and that allows you to work off some energy.  · Creating or listening to music.  · Muscle relaxation exercises.    Medicines  Some medicines may make you feel calmer and help you have fewer cravings. If your health care provider prescribes medicines, make sure you:  · Avoid using alcohol and other substances that may prevent your medicines from working properly (may interact).  · Talk with your pharmacist or health care provider about all medicines that you take, the possible problems (side effects) that they can cause, and  which medicines are safe to take together.  · Make it your goal to take part in all treatment decisions (shared decision-making). Ask about possible side effects of medicines that your health care provider recommends, and tell him or her how you feel about having those side effects. It is best if shared decision-making with your health care provider is part of your total treatment plan.  Relationships  Supportive relationships are very important in your recovery. When you are recovering from drug addiction, it will be important to avoid being around people who use drugs. For many people, this means developing new and different relationships. Some ways to do this include:  · Developing trusting relationships with the people you meet in treatment or in AA or NA. These people share your desire to stop using substances (get sober) and to stay sober.  · Getting a sponsor as a primary support person if you are attending a 12-step program.  · Building relationships with people you meet through activities such as hobbies, volunteering, or exercising.  How to recognize changes in your condition  When recovering from an addiction, it is very common for a person to relapse and start using the substance again. Contact your sponsor, therapist, or health care provider to seek additional help if you experience the following:  · Anxiety.  · Excessive anger.  · Isolating yourself from others.  · Trouble sleeping.  · Feeling depressed.  · Loss of appetite.  · Fantasies about using the substance.  Where to find support  Talking to others  · You may be advised to see a family therapist along with members of your family. Family therapy can help you and your family understand what led you to addiction. Talk with your family about this approach.  · Let your family members or friends know that they can help you through treatment. Support from loved ones will be important to help you maintain positive changes.  Financial resources  Be sure  to check with your insurance carrier to find out what treatment options are covered by your plan. You may also be able to find financial assistance through not-for-profit organizations or with local government-based resources.  If you are taking medicines, you may be able to get the generic form, which may be less expensive than brand-name medicine. Some makers of prescription medicines also offer help to patients who cannot afford the medicines that they need.  Follow these instructions at home:  · Take over-the-counter and prescription medicines only as told by your health care provider.  · Stay in treatment until you complete the program. Take an active role in your treatment and your physical and emotional self-care. Develop a follow-up plan.  · Keep all follow-up visits as told by your health care provider and counselor. This is important.  · Eat a healthy diet, exercise regularly, and get enough sleep.  Questions to ask your health care provider  · If you are taking medicines:  ? How long do I need to take medicine?  ? Are there any long-term side effects of my medicine?  ? Are there other options instead of taking medicine?  · Would I benefit from therapy?  · How often should I follow up with a health care provider?  Contact a health care provider if:  · You feel like you might relapse.  · You have stopped taking your medicine.  Get help right away if:  · You have serious thoughts about hurting yourself or others.  If you ever feel like you may hurt yourself or others, or have thoughts about taking your own life, get help right away. You can go to your nearest emergency department or call:  · Your local emergency services (911 in the [place]  · A suicide crisis helpline, such as the National Suicide Prevention Lifeline at 1-460.554.4482. This is open 24 hours a day.  Summary  · With treatment and lifestyle changes, it is possible to recover from an addiction to substances like alcohol, tobacco, illegal drugs,  or prescription medicines such as painkillers.  · Find effective ways to manage your stress to avoid a relapse. Some techniques to cope with stress include exercise, meditation, yoga, and deep breathing.  · Let loved ones know that their support is important to help you recover.  · If you have any signs that you may relapse, contact your 12-step sponsor, therapist, or health care provider to seek additional help.  This information is not intended to replace advice given to you by your health care provider. Make sure you discuss any questions you have with your health care provider.  Document Released: 05/04/2018 Document Revised: 09/11/2020 Document Reviewed: 05/04/2018  [employer] Patient Education © 2020 [employer]    Alcohol Use Disorder  Alcohol use disorder is when your drinking disrupts your daily life. When you have this condition, you drink too much alcohol and you cannot control your drinking.  Alcohol use disorder can cause serious problems with your physical health. It can affect your brain, heart, liver, pancreas, immune system, stomach, and intestines. Alcohol use disorder can increase your risk for certain cancers and cause problems with your mental health, such as depression, anxiety, psychosis, delirium, and dementia. People with this disorder risk hurting themselves and others.  What are the causes?  This condition is caused by drinking too much alcohol over time. It is not caused by drinking too much alcohol only one or two times. Some people with this condition drink alcohol to cope with or escape from negative life events. Others drink to relieve pain or symptoms of mental illness.  What increases the risk?  You are more likely to develop this condition if:  · You have a family history of alcohol use disorder.  · Your culture encourages drinking to the point of intoxication, or makes alcohol easy to get.  · You had a mood or conduct disorder in childhood.  · You have been a victim of  abuse.  · You are an adolescent and:  ? You have poor grades or difficulties in school.  ? Your caregivers do not talk to you about saying no to alcohol, or supervise your activities.  ? You are impulsive or you have trouble with self-control.  What are the signs or symptoms?  Symptoms of this condition include:  · Drinking more than you want to.  · Drinking for longer than you want to.  · Trying several times to drink less or to control your drinking.  · Spending a lot of time getting alcohol, drinking, or recovering from drinking.  · Craving alcohol.  · Having problems at work, at school, or at home due to drinking.  · Having problems in relationships due to drinking.  · Drinking when it is dangerous to drink, such as before driving a car.  · Continuing to drink even though you know you might have a physical or mental problem related to drinking.  · Needing more and more alcohol to get the same effect you want from the alcohol (building up tolerance).  · Having symptoms of withdrawal when you stop drinking. Symptoms of withdrawal include:  ? Fatigue.  ? Nightmares.  ? Trouble sleeping.  ? Depression.  ? Anxiety.  ? Fever.  ? Seizures.  ? Severe confusion.  ? Feeling or seeing things that are not there (hallucinations).  ? Tremors.  ? Rapid heart rate.  ? Rapid breathing.  ? High blood pressure.  · Drinking to avoid symptoms of withdrawal.  How is this diagnosed?  This condition is diagnosed with an assessment. Your health care provider may start the assessment by asking three or four questions about your drinking.  Your health care provider may perform a physical exam or do lab tests to see if you have physical problems resulting from alcohol use. She or he may refer you to a mental health professional for evaluation.  How is this treated?  Some people with alcohol use disorder are able to reduce their alcohol use to low-risk levels. Others need to completely quit drinking alcohol. When necessary, mental health  professionals with specialized training in substance use treatment can help. Your health care provider can help you decide how severe your alcohol use disorder is and what type of treatment you need. The following forms of treatment are available:  · Detoxification. Detoxification involves quitting drinking and using prescription medicines within the first week to help lessen withdrawal symptoms. This treatment is important for people who have had withdrawal symptoms before and for heavy drinkers who are likely to have withdrawal symptoms. Alcohol withdrawal can be dangerous, and in severe cases, it can cause death. Detoxification may be provided in a home, community, or primary care setting, or in a hospital or substance use treatment facility.  · Counseling. This treatment is also called talk therapy. It is provided by substance use treatment counselors. A counselor can address the reasons you use alcohol and suggest ways to keep you from drinking again or to prevent problem drinking. The goals of talk therapy are to:  ? Find healthy activities and ways for you to cope with stress.  ? Identify and avoid the things that trigger your alcohol use.  ? Help you learn how to handle cravings.  · Medicines. Medicines can help treat alcohol use disorder by:  ? Decreasing alcohol cravings.  ? Decreasing the positive feeling you have when you drink alcohol.  ? Causing an uncomfortable physical reaction when you drink alcohol (aversion therapy).  · Support groups. Support groups are led by people who have quit drinking. They provide emotional support, advice, and guidance.  These forms of treatment are often combined. Some people with this condition benefit from a combination of treatments provided by specialized substance use treatment centers.  Follow these instructions at home:  · Take over-the-counter and prescription medicines only as told by your health care provider.  · Check with your health care provider before  starting any new medicines.  · Ask friends and family members not to offer you alcohol.  · Avoid situations where alcohol is served, including gatherings where others are drinking alcohol.  · Create a plan for what to do when you are tempted to use alcohol.  · Find hobbies or activities that you enjoy that do not include alcohol.  · Keep all follow-up visits as told by your health care provider. This is important.  How is this prevented?  · If you drink, limit alcohol intake to no more than 1 drink a day for nonpregnant women and 2 drinks a day for men. One drink equals 12 oz of beer, 5 oz of wine, or 1½ oz of hard liquor.  · If you have a mental health condition, get treatment and support.  · Do not give alcohol to adolescents.  · If you are an adolescent:  ? Do not drink alcohol.  ? Do not be afraid to say no if someone offers you alcohol. Speak up about why you do not want to drink. You can be a positive role model for your friends and set a good example for those around you by not drinking alcohol.  ? If your friends drink, spend time with others who do not drink alcohol. Make new friends who do not use alcohol.  ? Find healthy ways to manage stress and emotions, such as meditation or deep breathing, exercise, spending time in nature, listening to music, or talking with a trusted friend or family member.  Contact a health care provider if:  · You are not able to take your medicines as told.  · Your symptoms get worse.  · You return to drinking alcohol (relapse) and your symptoms get worse.  Get help right away if:  · You have thoughts about hurting yourself or others.  If you ever feel like you may hurt yourself or others, or have thoughts about taking your own life, get help right away. You can go to your nearest emergency department or call:  · Your local emergency services (911 in the [place]  · A suicide crisis helpline, such as the National Suicide Prevention Lifeline at 1-264.871.2775. This is open 24 hours  a day.  Summary  · Alcohol use disorder is when your drinking disrupts your daily life. When you have this condition, you drink too much alcohol and you cannot control your drinking.  · Treatment may include detoxification, counseling, medicine, and support groups.  · Ask friends and family members not to offer you alcohol. Avoid situations where alcohol is served.  · Get help right away if you have thoughts about hurting yourself or others.  This information is not intended to replace advice given to you by your health care provider. Make sure you discuss any questions you have with your health care provider.  Document Released: 01/25/2006 Document Revised: 11/30/2018 Document Reviewed: 09/14/2017  [employer] Patient Education © 2020 [employer]

## 2020-11-12 NOTE — OUTREACH NOTE
Medical Week 2 Survey      Responses   [facility] patient discharged from?  [place]   Does the patient have one of the following disease processes/diagnoses(primary or secondary)?  Other   Week 2 attempt successful?  Yes   Call start time  1703   Discharge diagnosis  Alcohol withdrawal syndrome    Call end time  1710   Meds reviewed with patient/caregiver?  Yes   Is the patient taking all medications as directed (includes completed medication regime)?  No   Medication comments  Does not have Librium was only given 3 days from his hospital stay. Feels nervous. Serequol added for sleep   Has the patient kept scheduled appointments due by today?  Yes   Comments  Saw PCP today.          Will set up appt with psych not sure when he will make appt with his work schedule.   What is the patient's perception of their health status since discharge?  Improving   Additional teach back comments  Has not drank since 10/31/2020.   Feels anxious and nervous, advised to discuss with PCP.   Week 2 Call Completed?  Yes          Aileen Turcios RN

## 2020-12-14 DIAGNOSIS — G47.00 INSOMNIA, UNSPECIFIED TYPE: ICD-10-CM

## 2020-12-14 DIAGNOSIS — F41.9 ANXIETY: ICD-10-CM

## 2020-12-14 RX ORDER — QUETIAPINE FUMARATE 50 MG/1
50 TABLET, FILM COATED ORAL NIGHTLY
Qty: 30 TABLET | Refills: 1 | OUTPATIENT
Start: 2020-12-14

## 2020-12-14 NOTE — TELEPHONE ENCOUNTER
Called [facility] spoke with Beny patient has 1 refill left called and spoke with patient to let him know. No further questions

## 2020-12-14 NOTE — TELEPHONE ENCOUNTER
Caller: Eugene, Dung    Relationship: Self    Best call back number: [phone]    Medication needed:   Requested Prescriptions     Pending Prescriptions Disp Refills   • QUEtiapine (SEROquel) 50 MG tablet 30 tablet 1     Sig: Take 1 tablet by mouth Every Night.       When do you need the refill by: 12/14/2020    What details did the patient provide when requesting the medication: Patient states he has one tablet left which he will take tonight (12/14/2020.)    Does the patient have less than a 3 day supply:  [x] Yes  [] No    What is the patient's preferred pharmacy: [id number] - [place], [place] - [address] - [phone]  - [phone]

## 2021-01-22 DIAGNOSIS — G47.00 INSOMNIA, UNSPECIFIED TYPE: ICD-10-CM

## 2021-01-22 DIAGNOSIS — F41.9 ANXIETY: ICD-10-CM

## 2021-01-22 RX ORDER — QUETIAPINE FUMARATE 50 MG/1
50 TABLET, FILM COATED ORAL NIGHTLY
Qty: 30 TABLET | Refills: 2 | Status: SHIPPED | OUTPATIENT
Start: 2021-01-22 | End: 2021-04-16

## 2021-01-26 ENCOUNTER — PRIOR AUTHORIZATION (OUTPATIENT)
Dept: FAMILY MEDICINE CLINIC | Facility: CLINIC | Age: 36
End: 2021-01-26

## 2021-01-26 NOTE — TELEPHONE ENCOUNTER
(Key: [id number]) - [id number]  QUEtiapine Fumarate 50MG tablets  Status:sent to plan  Created: January 22nd, 2021 [phone]  Sent: January 26th, 2021    Approved today  PA Case: [id number], Status: Approved, Coverage Starts on: 1/26/2021 12:00:00 AM, Coverage Ends on: 1/26/2022 12:00:00 AM. Questions? Contact [phone].

## 2021-04-16 DIAGNOSIS — F41.9 ANXIETY: ICD-10-CM

## 2021-04-16 DIAGNOSIS — G47.00 INSOMNIA, UNSPECIFIED TYPE: ICD-10-CM

## 2021-04-16 RX ORDER — QUETIAPINE FUMARATE 50 MG/1
TABLET, FILM COATED ORAL
Qty: 30 TABLET | Refills: 0 | Status: SHIPPED | OUTPATIENT
Start: 2021-04-16 | End: 2021-05-19 | Stop reason: SDUPTHER

## 2021-04-16 NOTE — TELEPHONE ENCOUNTER
LOV: 11/12/20    Attempted to contact, needs to schedule upcoming follow up appt. No answer. Unable to LVM

## 2021-05-19 DIAGNOSIS — G47.00 INSOMNIA, UNSPECIFIED TYPE: ICD-10-CM

## 2021-05-19 DIAGNOSIS — F41.9 ANXIETY: ICD-10-CM

## 2021-05-19 RX ORDER — QUETIAPINE FUMARATE 50 MG/1
50 TABLET, FILM COATED ORAL
Qty: 20 TABLET | Refills: 0 | Status: SHIPPED | OUTPATIENT
Start: 2021-05-19 | End: 2023-03-16 | Stop reason: SDUPTHER

## 2021-05-20 NOTE — TELEPHONE ENCOUNTER
Called PT, He stated he will call back for an appt. Understood and did not have any further questions

## 2021-07-23 ENCOUNTER — TELEPHONE (OUTPATIENT)
Dept: FAMILY MEDICINE CLINIC | Facility: CLINIC | Age: 36
End: 2021-07-23

## 2021-07-23 NOTE — TELEPHONE ENCOUNTER
Attempted to contact patient, no answer unable LVM    He will need an upcoming appt before receiving further refills.   Hub can relay and schedule

## 2021-08-20 DIAGNOSIS — I10 ESSENTIAL HYPERTENSION: ICD-10-CM

## 2021-08-20 RX ORDER — CARVEDILOL 6.25 MG/1
TABLET ORAL
Qty: 180 TABLET | Refills: 1 | OUTPATIENT
Start: 2021-08-20

## 2021-08-20 NOTE — TELEPHONE ENCOUNTER
Rx Refill Note  Requested Prescriptions     Pending Prescriptions Disp Refills   • carvedilol (COREG) 6.25 MG tablet [Pharmacy Med Name: CARVEDILOL 6.25 MG TABLET] 180 tablet 1     Sig: TAKE ONE TABLET BY MOUTH TWO TIMES A DAY WITH MEALS      Last office visit with prescribing clinician: Visit date not found      Next office visit with prescribing clinician: Visit date not found            Greg Tillman MA  08/20/21, 10:30 EDT     Unable to leave  (Not set up). Patient needs appointment before we can refill.

## 2021-09-08 ENCOUNTER — HOSPITAL ENCOUNTER (EMERGENCY)
Facility: HOSPITAL | Age: 36
Discharge: HOME | End: 2021-09-08
Attending: FAMILY MEDICINE | Admitting: FAMILY MEDICINE

## 2021-09-08 VITALS
HEIGHT: 67 IN | WEIGHT: 178 LBS | DIASTOLIC BLOOD PRESSURE: 97 MMHG | OXYGEN SATURATION: 98 % | RESPIRATION RATE: 16 BRPM | BODY MASS INDEX: 27.94 KG/M2 | HEART RATE: 112 BPM | SYSTOLIC BLOOD PRESSURE: 148 MMHG | TEMPERATURE: 98 F

## 2021-09-08 DIAGNOSIS — T24.219A PARTIAL THICKNESS BURN OF THIGH, UNSPECIFIED LATERALITY, INITIAL ENCOUNTER: ICD-10-CM

## 2021-09-08 DIAGNOSIS — T26.60XA: ICD-10-CM

## 2021-09-08 DIAGNOSIS — S05.01XA ABRASION OF RIGHT CORNEA, INITIAL ENCOUNTER: Primary | ICD-10-CM

## 2021-09-08 PROCEDURE — 99283 EMERGENCY DEPT VISIT LOW MDM: CPT

## 2021-09-08 RX ORDER — HYDROCODONE BITARTRATE AND ACETAMINOPHEN 5; 325 MG/1; MG/1
1 TABLET ORAL EVERY 6 HOURS PRN
Qty: 12 TABLET | Refills: 0 | Status: SHIPPED | OUTPATIENT
Start: 2021-09-08 | End: 2023-03-12 | Stop reason: HOSPADM

## 2021-09-08 RX ORDER — HYDROCODONE BITARTRATE AND ACETAMINOPHEN 5; 325 MG/1; MG/1
1 TABLET ORAL ONCE
Status: COMPLETED | OUTPATIENT
Start: 2021-09-08 | End: 2021-09-08

## 2021-09-08 RX ORDER — TETRACAINE HYDROCHLORIDE 5 MG/ML
2 SOLUTION OPHTHALMIC ONCE
Status: COMPLETED | OUTPATIENT
Start: 2021-09-08 | End: 2021-09-08

## 2021-09-08 RX ORDER — BACITRACIN ZINC 500 [USP'U]/G
OINTMENT TOPICAL ONCE
Status: COMPLETED | OUTPATIENT
Start: 2021-09-08 | End: 2021-09-08

## 2021-09-08 RX ORDER — GINSENG 100 MG
CAPSULE ORAL 2 TIMES DAILY
Qty: 28 G | Refills: 0 | Status: SHIPPED | OUTPATIENT
Start: 2021-09-08 | End: 2023-03-16

## 2021-09-08 RX ORDER — ERYTHROMYCIN 5 MG/G
1 OINTMENT OPHTHALMIC ONCE
Status: COMPLETED | OUTPATIENT
Start: 2021-09-08 | End: 2021-09-08

## 2021-09-08 RX ORDER — ERYTHROMYCIN 5 MG/G
OINTMENT OPHTHALMIC
Qty: 3.5 G | Refills: 0 | Status: SHIPPED | OUTPATIENT
Start: 2021-09-08 | End: 2021-09-10 | Stop reason: SDUPTHER

## 2021-09-08 RX ADMIN — ERYTHROMYCIN 1 APPLICATION: 5 OINTMENT OPHTHALMIC at 03:41

## 2021-09-08 RX ADMIN — BACITRACIN ZINC 1 EACH: 500 OINTMENT TOPICAL at 03:41

## 2021-09-08 RX ADMIN — HYDROCODONE BITARTRATE AND ACETAMINOPHEN 1 TABLET: 5; 325 TABLET ORAL at 03:40

## 2021-09-08 RX ADMIN — FLUORESCEIN SODIUM 1 STRIP: 1 STRIP OPHTHALMIC at 02:49

## 2021-09-08 RX ADMIN — TETRACAINE HYDROCHLORIDE 2 DROP: 5 SOLUTION OPHTHALMIC at 02:49
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
## 2021-09-10 ENCOUNTER — TRANSCRIBE ORDERS (OUTPATIENT)
Dept: LAB | Facility: HOSPITAL | Age: 36
End: 2021-09-10

## 2021-09-10 ENCOUNTER — LAB (OUTPATIENT)
Dept: LAB | Facility: HOSPITAL | Age: 36
End: 2021-09-10

## 2021-09-10 DIAGNOSIS — Z20.822 COVID-19 RULED OUT: ICD-10-CM

## 2021-09-10 DIAGNOSIS — Z20.822 COVID-19 RULED OUT: Primary | ICD-10-CM

## 2021-09-10 PROCEDURE — C9803 HOPD COVID-19 SPEC COLLECT: HCPCS

## 2021-09-10 PROCEDURE — U0004 COV-19 TEST NON-CDC HGH THRU: HCPCS

## 2021-09-10 RX ORDER — ERYTHROMYCIN 5 MG/G
OINTMENT OPHTHALMIC
Qty: 3.5 G | Refills: 0 | Status: SHIPPED | OUTPATIENT
Start: 2021-09-10 | End: 2023-03-16

## 2021-09-11 LAB — SARS-COV-2 RNA NOSE QL NAA+PROBE: NOT DETECTED

## 2022-01-21 ENCOUNTER — HOSPITAL ENCOUNTER (EMERGENCY)
Facility: HOSPITAL | Age: 37
Discharge: HOME | End: 2022-01-21
Attending: EMERGENCY MEDICINE | Admitting: EMERGENCY MEDICINE

## 2022-01-21 ENCOUNTER — APPOINTMENT (OUTPATIENT)
Dept: GENERAL RADIOLOGY | Facility: HOSPITAL | Age: 37
End: 2022-01-21

## 2022-01-21 VITALS
SYSTOLIC BLOOD PRESSURE: 120 MMHG | TEMPERATURE: 98.7 F | HEART RATE: 108 BPM | BODY MASS INDEX: 25.9 KG/M2 | RESPIRATION RATE: 19 BRPM | OXYGEN SATURATION: 94 % | HEIGHT: 67 IN | DIASTOLIC BLOOD PRESSURE: 80 MMHG | WEIGHT: 165 LBS

## 2022-01-21 DIAGNOSIS — R05.9 COUGH: Primary | ICD-10-CM

## 2022-01-21 LAB — SARS-COV-2 RNA PNL SPEC NAA+PROBE: NOT DETECTED

## 2022-01-21 PROCEDURE — 93005 ELECTROCARDIOGRAM TRACING: CPT | Performed by: EMERGENCY MEDICINE

## 2022-01-21 PROCEDURE — 71045 X-RAY EXAM CHEST 1 VIEW: CPT

## 2022-01-21 PROCEDURE — 99283 EMERGENCY DEPT VISIT LOW MDM: CPT

## 2022-01-21 PROCEDURE — 87635 SARS-COV-2 COVID-19 AMP PRB: CPT | Performed by: NURSE PRACTITIONER

## 2022-01-21 RX ORDER — ALBUTEROL SULFATE 90 UG/1
2 AEROSOL, METERED RESPIRATORY (INHALATION) ONCE
Status: COMPLETED | OUTPATIENT
Start: 2022-01-21 | End: 2022-01-21

## 2022-01-21 RX ADMIN — ALBUTEROL SULFATE 2 PUFF: 90 AEROSOL, METERED RESPIRATORY (INHALATION) at 12:29

## 2022-01-21 NOTE — ED PROVIDER NOTES
Subjective   36-year-old male presents to the ED today for complaint of cough, wheezing, sinus drainage and congestion over the last 2 weeks.  He says his wheezing has gotten worse as well as his congestion.  He says everyone around him has COVID and he is concerned that he may have COVID as well.  He has been coughing a little bit more than usual.  He is a smoker and coughs normally but he is concerned that his wheezing has gotten worse.  No fevers, chills, nausea or vomiting.  No other associated signs or symptoms at this time.          Review of Systems   Constitutional: Negative.    HENT: Positive for congestion, postnasal drip, rhinorrhea and sinus pressure.    Eyes: Negative.    Respiratory: Positive for cough and wheezing.    Cardiovascular: Negative.    Gastrointestinal: Negative.    Endocrine: Negative.    Genitourinary: Negative.    Musculoskeletal: Negative.    Skin: Negative.    Allergic/Immunologic: Negative.    Neurological: Negative.    Hematological: Negative.    Psychiatric/Behavioral: Negative.        Past Medical History:   Diagnosis Date   • Asthma    • Hypertension    • Scoliosis    • Seizures (HCC)        Allergies   Allergen Reactions   • Penicillins Unknown (See Comments)     Unknown reaction       Past Surgical History:   Procedure Laterality Date   • COLONOSCOPY     • HERNIA REPAIR     • TESTICLE SURGERY         Family History   Problem Relation Age of Onset   • Arthritis Mother    • Arthritis Father        Social History     Socioeconomic History   • Marital status:    Tobacco Use   • Smoking status: Current Every Day Smoker     Packs/day: 3.50     Types: Cigarettes     Start date: 2/7/2004   • Smokeless tobacco: Never Used   Substance and Sexual Activity   • Alcohol use: Yes     Alcohol/week: 21.0 standard drinks     Types: 21 Cans of beer per week   • Drug use: Not Currently     Types: Marijuana   • Sexual activity: Defer           Objective   Physical Exam  Vitals and nursing  note reviewed.   Constitutional:       Appearance: Normal appearance.   HENT:      Head: Normocephalic and atraumatic.      Right Ear: External ear normal.      Left Ear: External ear normal.      Nose: Congestion present.      Mouth/Throat:      Mouth: Mucous membranes are moist.      Pharynx: Oropharynx is clear.   Eyes:      Extraocular Movements: Extraocular movements intact.      Conjunctiva/sclera: Conjunctivae normal.      Pupils: Pupils are equal, round, and reactive to light.   Cardiovascular:      Rate and Rhythm: Regular rhythm. Tachycardia present.      Pulses: Normal pulses.   Pulmonary:      Effort: Pulmonary effort is normal.      Breath sounds: Wheezing present.   Abdominal:      General: Abdomen is flat. Bowel sounds are normal.   Musculoskeletal:         General: Normal range of motion.      Cervical back: Normal range of motion.   Skin:     General: Skin is warm and dry.      Capillary Refill: Capillary refill takes less than 2 seconds.   Neurological:      General: No focal deficit present.      Mental Status: He is alert and oriented to person, place, and time.   Psychiatric:         Mood and Affect: Mood normal.         Behavior: Behavior normal.         Thought Content: Thought content normal.         Judgment: Judgment normal.         Procedures           ED Course                                                 MDM      Final diagnoses:   Cough       ED Disposition  ED Disposition     ED Disposition Condition Comment    Discharge Stable           Han Reyes, APRN  [address] [address]  [phone]    Schedule an appointment as soon as possible for a visit   As needed, If symptoms worsen         Medication List      No changes were made to your prescriptions during this visit.          Naila Lan, APRN  01/21/22 8399

## 2022-01-21 NOTE — DISCHARGE INSTRUCTIONS
Drink more fluids, rest  COVID was negative.   Consider smoking cessation to help with cough.   May do cough syrup OTC if needed

## 2022-01-21 NOTE — ED NOTES
Pt received discharge instructions and verbalized understanding; Breathing even and non labored with no signs of distress; AOx4; GCS 15; Pt ambulated off unit with steady gait to meet ride.      Fiona Contreras, RN  01/21/22 2763

## 2023-03-08 ENCOUNTER — HOSPITAL ENCOUNTER (INPATIENT)
Facility: HOSPITAL | Age: 38
LOS: 3 days | Discharge: HOME | DRG: 897 | End: 2023-03-12
Attending: EMERGENCY MEDICINE | Admitting: STUDENT IN AN ORGANIZED HEALTH CARE EDUCATION/TRAINING PROGRAM
Payer: COMMERCIAL

## 2023-03-08 DIAGNOSIS — F10.920 ALCOHOLIC INTOXICATION WITHOUT COMPLICATION: ICD-10-CM

## 2023-03-08 DIAGNOSIS — F10.21 HISTORY OF ALCOHOLISM: Primary | ICD-10-CM

## 2023-03-08 LAB
ALBUMIN SERPL-MCNC: 4.6 G/DL (ref 3.5–5.2)
ALBUMIN/GLOB SERPL: 1.7 G/DL
ALP SERPL-CCNC: 84 U/L (ref 39–117)
ALT SERPL W P-5'-P-CCNC: 138 U/L (ref 1–41)
AMPHET+METHAMPHET UR QL: NEGATIVE
AMPHETAMINES UR QL: NEGATIVE
ANION GAP SERPL CALCULATED.3IONS-SCNC: 14 MMOL/L (ref 5–15)
AST SERPL-CCNC: 107 U/L (ref 1–40)
BARBITURATES UR QL SCN: NEGATIVE
BASOPHILS # BLD AUTO: 0.12 10*3/MM3 (ref 0–0.2)
BASOPHILS NFR BLD AUTO: 1.2 % (ref 0–1.5)
BENZODIAZ UR QL SCN: NEGATIVE
BILIRUB SERPL-MCNC: 0.3 MG/DL (ref 0–1.2)
BUN SERPL-MCNC: 6 MG/DL (ref 6–20)
BUN/CREAT SERPL: 8.2 (ref 7–25)
BUPRENORPHINE SERPL-MCNC: NEGATIVE NG/ML
CALCIUM SPEC-SCNC: 8.5 MG/DL (ref 8.6–10.5)
CANNABINOIDS SERPL QL: NEGATIVE
CHLORIDE SERPL-SCNC: 94 MMOL/L (ref 98–107)
CO2 SERPL-SCNC: 24 MMOL/L (ref 22–29)
COCAINE UR QL: NEGATIVE
CREAT SERPL-MCNC: 0.73 MG/DL (ref 0.76–1.27)
DEPRECATED RDW RBC AUTO: 46.5 FL (ref 37–54)
EGFRCR SERPLBLD CKD-EPI 2021: 120.9 ML/MIN/1.73
EOSINOPHIL # BLD AUTO: 0.16 10*3/MM3 (ref 0–0.4)
EOSINOPHIL NFR BLD AUTO: 1.5 % (ref 0.3–6.2)
ERYTHROCYTE [DISTWIDTH] IN BLOOD BY AUTOMATED COUNT: 13.3 % (ref 12.3–15.4)
ETHANOL BLD-MCNC: 240 MG/DL (ref 0–10)
ETHANOL BLD-MCNC: 94 MG/DL (ref 0–10)
GLOBULIN UR ELPH-MCNC: 2.7 GM/DL
GLUCOSE SERPL-MCNC: 120 MG/DL (ref 65–99)
HCT VFR BLD AUTO: 52.6 % (ref 37.5–51)
HGB BLD-MCNC: 18.4 G/DL (ref 13–17.7)
HOLD SPECIMEN: NORMAL
IMM GRANULOCYTES # BLD AUTO: 0.05 10*3/MM3 (ref 0–0.05)
IMM GRANULOCYTES NFR BLD AUTO: 0.5 % (ref 0–0.5)
LYMPHOCYTES # BLD AUTO: 2.22 10*3/MM3 (ref 0.7–3.1)
LYMPHOCYTES NFR BLD AUTO: 21.4 % (ref 19.6–45.3)
MAGNESIUM SERPL-MCNC: 2.4 MG/DL (ref 1.6–2.6)
MCH RBC QN AUTO: 33 PG (ref 26.6–33)
MCHC RBC AUTO-ENTMCNC: 35 G/DL (ref 31.5–35.7)
MCV RBC AUTO: 94.4 FL (ref 79–97)
METHADONE UR QL SCN: NEGATIVE
MONOCYTES # BLD AUTO: 0.73 10*3/MM3 (ref 0.1–0.9)
MONOCYTES NFR BLD AUTO: 7.1 % (ref 5–12)
NEUTROPHILS NFR BLD AUTO: 68.3 % (ref 42.7–76)
NEUTROPHILS NFR BLD AUTO: 7.07 10*3/MM3 (ref 1.7–7)
NRBC BLD AUTO-RTO: 0 /100 WBC (ref 0–0.2)
OPIATES UR QL: NEGATIVE
OXYCODONE UR QL SCN: NEGATIVE
PCP UR QL SCN: NEGATIVE
PLATELET # BLD AUTO: 181 10*3/MM3 (ref 140–450)
PMV BLD AUTO: 10.7 FL (ref 6–12)
POTASSIUM SERPL-SCNC: 4.4 MMOL/L (ref 3.5–5.2)
PROPOXYPH UR QL: NEGATIVE
PROT SERPL-MCNC: 7.3 G/DL (ref 6–8.5)
RBC # BLD AUTO: 5.57 10*6/MM3 (ref 4.14–5.8)
SODIUM SERPL-SCNC: 132 MMOL/L (ref 136–145)
TRICYCLICS UR QL SCN: NEGATIVE
WBC NRBC COR # BLD: 10.35 10*3/MM3 (ref 3.4–10.8)
WHOLE BLOOD HOLD COAG: NORMAL
WHOLE BLOOD HOLD SPECIMEN: NORMAL

## 2023-03-08 PROCEDURE — 80053 COMPREHEN METABOLIC PANEL: CPT | Performed by: EMERGENCY MEDICINE

## 2023-03-08 PROCEDURE — 80306 DRUG TEST PRSMV INSTRMNT: CPT | Performed by: EMERGENCY MEDICINE

## 2023-03-08 PROCEDURE — 99285 EMERGENCY DEPT VISIT HI MDM: CPT

## 2023-03-08 PROCEDURE — 25010000002 THIAMINE PER 100 MG: Performed by: EMERGENCY MEDICINE

## 2023-03-08 PROCEDURE — 83735 ASSAY OF MAGNESIUM: CPT | Performed by: EMERGENCY MEDICINE

## 2023-03-08 PROCEDURE — 85025 COMPLETE CBC W/AUTO DIFF WBC: CPT | Performed by: EMERGENCY MEDICINE

## 2023-03-08 PROCEDURE — 25010000002 ONDANSETRON PER 1 MG: Performed by: EMERGENCY MEDICINE

## 2023-03-08 PROCEDURE — 82077 ASSAY SPEC XCP UR&BREATH IA: CPT | Performed by: EMERGENCY MEDICINE

## 2023-03-08 RX ORDER — ONDANSETRON 2 MG/ML
4 INJECTION INTRAMUSCULAR; INTRAVENOUS ONCE
Status: COMPLETED | OUTPATIENT
Start: 2023-03-08 | End: 2023-03-08

## 2023-03-08 RX ADMIN — THIAMINE HYDROCHLORIDE 1000 ML/HR: 100 INJECTION, SOLUTION INTRAMUSCULAR; INTRAVENOUS at 19:55

## 2023-03-08 RX ADMIN — ONDANSETRON 4 MG: 2 INJECTION INTRAMUSCULAR; INTRAVENOUS at 22:07

## 2023-03-08 NOTE — ED PROVIDER NOTES
Subjective   History of Present Illness  37-year-old male presents requesting voluntary alcohol detox.  The patient has a long history of alcoholism and notes that he has been an alcoholic for approximately 15 years.  He does have a prior history of withdrawal seizures in the past.  He notes that his drinks of choice are beer and liquor and tells me that his last drink was approximately 30 minutes before coming to the ED.  He has been thinking about voluntary detox now for quite some time and decided today to come to the emergency department for evaluation after making several phone calls as he was pointed in our direction.  He is not suicidal or homicidal.  He does not feel tremulous right now.        Review of Systems   Psychiatric/Behavioral: Negative for suicidal ideas.   All other systems reviewed and are negative.      Past Medical History:   Diagnosis Date   • Asthma    • Hypertension    • Scoliosis    • Seizures (HCC)        Allergies   Allergen Reactions   • Penicillins Unknown (See Comments)     Unknown reaction       Past Surgical History:   Procedure Laterality Date   • COLONOSCOPY     • HERNIA REPAIR     • TESTICLE SURGERY         Family History   Problem Relation Age of Onset   • Arthritis Mother    • Arthritis Father        Social History     Socioeconomic History   • Marital status:    Tobacco Use   • Smoking status: Every Day     Packs/day: 3.50     Types: Cigarettes     Start date: 2/7/2004   • Smokeless tobacco: Never   Substance and Sexual Activity   • Alcohol use: Yes     Alcohol/week: 21.0 standard drinks     Types: 21 Cans of beer per week   • Drug use: Not Currently     Types: Marijuana   • Sexual activity: Defer           Objective   Physical Exam  Vitals and nursing note reviewed.   Constitutional:       General: He is not in acute distress.     Appearance: Normal appearance. He is well-developed. He is not diaphoretic.      Comments: Nontoxic-appearing male   HENT:      Head:  Normocephalic and atraumatic.   Neck:      Vascular: No JVD.   Cardiovascular:      Rate and Rhythm: Regular rhythm. Tachycardia present.      Heart sounds: Normal heart sounds. No murmur heard.    No friction rub. No gallop.   Pulmonary:      Effort: Pulmonary effort is normal. No respiratory distress.      Breath sounds: Normal breath sounds. No wheezing or rales.   Abdominal:      General: Bowel sounds are normal. There is no distension.      Palpations: Abdomen is soft. There is no mass.      Tenderness: There is no abdominal tenderness. There is no guarding.   Musculoskeletal:         General: Normal range of motion.   Skin:     General: Skin is warm and dry.      Coloration: Skin is not pale.      Findings: No erythema or rash.   Neurological:      Mental Status: He is alert and oriented to person, place, and time.      Comments: Atremulous, normal gait, neurovascularly intact distally in all fours with bounding distal pulses and normal sensation   Psychiatric:         Mood and Affect: Mood normal.         Thought Content: Thought content normal.         Judgment: Judgment normal.         Procedures           ED Course  ED Course as of 03/10/23 0602   Wed Mar 08, 2023   1806 37-year-old male presents requesting voluntary alcohol detox.  The patient is a longtime drinker and notes that he has been an alcoholic for approximately 15 years.  He has a prior history of withdrawal seizures.  He presents today requesting voluntary detox and notes that his last drink was approximately 30 minutes before coming to the ED.  He is not suicidal or homicidal.  He is cooperative with all questions.  We will obtain labs to clear the patient from a medical standpoint prior to speaking with our addiction team to help us with his ultimate disposition. [DD]   1905 Labs remarkable for blood alcohol level of 240.  We will wait for the patient to sober up to an acceptable level to be evaluated by our behavioral health/addiction team.  [DD]   2354 Repeat blood alcohol level is less than 100.  The patient can now be assessed by our behavioral health/addiction team in [place]. [DD]   Thu Mar 09, 2023   0109 I am still currently waiting on the patient to be assessed by our behavioral health team.  Patient checked out to [facility] with plan to follow-up the patient's disposition recommendations after he is assessed by our addiction specialists.  The patient is aware/agreeable with the plan at this time. [DD]   0311 Patient was initially seen by Dr. Lynch pending behavioral health evaluation for placement for alcohol detox.  Was ultimately evaluated by behavioral health and determined to be too sick for detox and requiring medical management.  CIWA was 14.  I have treated him with IV lorazepam with improvement in symptoms.  In discussing with him he says his plan is to pursue substance abuse therapy after he gets through his initial withdrawals.  Medicine team consulted for hospital admission. [CC]      ED Course User Index  [CC] Abdirashid Bruno MD  [DD] Francisco Lynch MD                                          Recent Results (from the past 24 hour(s))   Ethanol    Collection Time: 03/08/23  6:23 PM    Specimen: Blood   Result Value Ref Range    Ethanol 240 (H) 0 - 10 mg/dL   Green Top (Gel)    Collection Time: 03/08/23  6:23 PM   Result Value Ref Range    Extra Tube Hold for add-ons.    Lavender Top    Collection Time: 03/08/23  6:23 PM   Result Value Ref Range    Extra Tube hold for add-on    Gold Top - SST    Collection Time: 03/08/23  6:23 PM   Result Value Ref Range    Extra Tube Hold for add-ons.    Gray Top    Collection Time: 03/08/23  6:23 PM   Result Value Ref Range    Extra Tube Hold for add-ons.    Light Blue Top    Collection Time: 03/08/23  6:23 PM   Result Value Ref Range    Extra Tube Hold for add-ons.    Comprehensive Metabolic Panel    Collection Time: 03/08/23  6:23 PM    Specimen: Blood   Result Value Ref Range     Glucose 120 (H) 65 - 99 mg/dL    BUN 6 6 - 20 mg/dL    Creatinine 0.73 (L) 0.76 - 1.27 mg/dL    Sodium 132 (L) 136 - 145 mmol/L    Potassium 4.4 3.5 - 5.2 mmol/L    Chloride 94 (L) 98 - 107 mmol/L    CO2 24.0 22.0 - 29.0 mmol/L    Calcium 8.5 (L) 8.6 - 10.5 mg/dL    Total Protein 7.3 6.0 - 8.5 g/dL    Albumin 4.6 3.5 - 5.2 g/dL    ALT (SGPT) 138 (H) 1 - 41 U/L    AST (SGOT) 107 (H) 1 - 40 U/L    Alkaline Phosphatase 84 39 - 117 U/L    Total Bilirubin 0.3 0.0 - 1.2 mg/dL    Globulin 2.7 gm/dL    A/G Ratio 1.7 g/dL    BUN/Creatinine Ratio 8.2 7.0 - 25.0    Anion Gap 14.0 5.0 - 15.0 mmol/L    eGFR 120.9 >60.0 mL/min/1.73   Magnesium    Collection Time: 03/08/23  6:23 PM    Specimen: Blood   Result Value Ref Range    Magnesium 2.4 1.6 - 2.6 mg/dL   CBC Auto Differential    Collection Time: 03/08/23  6:23 PM    Specimen: Blood   Result Value Ref Range    WBC 10.35 3.40 - 10.80 10*3/mm3    RBC 5.57 4.14 - 5.80 10*6/mm3    Hemoglobin 18.4 (H) 13.0 - 17.7 g/dL    Hematocrit 52.6 (H) 37.5 - 51.0 %    MCV 94.4 79.0 - 97.0 fL    MCH 33.0 26.6 - 33.0 pg    MCHC 35.0 31.5 - 35.7 g/dL    RDW 13.3 12.3 - 15.4 %    RDW-SD 46.5 37.0 - 54.0 fl    MPV 10.7 6.0 - 12.0 fL    Platelets 181 140 - 450 10*3/mm3    Neutrophil % 68.3 42.7 - 76.0 %    Lymphocyte % 21.4 19.6 - 45.3 %    Monocyte % 7.1 5.0 - 12.0 %    Eosinophil % 1.5 0.3 - 6.2 %    Basophil % 1.2 0.0 - 1.5 %    Immature Grans % 0.5 0.0 - 0.5 %    Neutrophils, Absolute 7.07 (H) 1.70 - 7.00 10*3/mm3    Lymphocytes, Absolute 2.22 0.70 - 3.10 10*3/mm3    Monocytes, Absolute 0.73 0.10 - 0.90 10*3/mm3    Eosinophils, Absolute 0.16 0.00 - 0.40 10*3/mm3    Basophils, Absolute 0.12 0.00 - 0.20 10*3/mm3    Immature Grans, Absolute 0.05 0.00 - 0.05 10*3/mm3    nRBC 0.0 0.0 - 0.2 /100 WBC   Urine Drug Screen - Urine, Clean Catch    Collection Time: 03/08/23  6:24 PM    Specimen: Urine, Clean Catch   Result Value Ref Range    THC, Screen, Urine Negative Negative    Phencyclidine (PCP),  Urine Negative Negative    Cocaine Screen, Urine Negative Negative    Methamphetamine, Ur Negative Negative    Opiate Screen Negative Negative    Amphetamine Screen, Urine Negative Negative    Benzodiazepine Screen, Urine Negative Negative    Tricyclic Antidepressants Screen Negative Negative    Methadone Screen, Urine Negative Negative    Barbiturates Screen, Urine Negative Negative    Oxycodone Screen, Urine Negative Negative    Propoxyphene Screen Negative Negative    Buprenorphine, Screen, Urine Negative Negative   Ethanol    Collection Time: 03/08/23 11:12 PM    Specimen: Blood   Result Value Ref Range    Ethanol 94 (H) 0 - 10 mg/dL     Note: In addition to lab results from this visit, the labs listed above may include labs taken at another facility or during a different encounter within the last 24 hours. Please correlate lab times with ED admission and discharge times for further clarification of the services performed during this visit.    No orders to display     Vitals:    03/08/23 2118 03/08/23 2200 03/08/23 2300 03/09/23 0000   BP: 124/79 122/74 109/64 114/73   Pulse: 94 87 85 83   Resp:       Temp:       TempSrc:       SpO2: 94% 94% 92% 92%   Weight:       Height:         Medications   thiamine (B-1) 100 mg, folic acid 1 mg in sodium chloride 0.9 % 1,000 mL infusion (0 mL/hr Intravenous Stopped 3/8/23 2208)   ondansetron (ZOFRAN) injection 4 mg (4 mg Intravenous Given 3/8/23 2207)     ECG/EMG Results (last 24 hours)     ** No results found for the last 24 hours. **        No orders to display       Recent Results (from the past 24 hour(s))   Basic Metabolic Panel    Collection Time: 03/09/23  2:23 PM    Specimen: Blood   Result Value Ref Range    Glucose 144 (H) 65 - 99 mg/dL    BUN 8 6 - 20 mg/dL    Creatinine 0.52 (L) 0.76 - 1.27 mg/dL    Sodium 132 (L) 136 - 145 mmol/L    Potassium 4.2 3.5 - 5.2 mmol/L    Chloride 99 98 - 107 mmol/L    CO2 23.0 22.0 - 29.0 mmol/L    Calcium 8.3 (L) 8.6 - 10.5 mg/dL     BUN/Creatinine Ratio 15.4 7.0 - 25.0    Anion Gap 10.0 5.0 - 15.0 mmol/L    eGFR 134.0 >60.0 mL/min/1.73   Magnesium    Collection Time: 03/09/23  2:23 PM    Specimen: Blood   Result Value Ref Range    Magnesium 2.1 1.6 - 2.6 mg/dL   Manual Differential    Collection Time: 03/09/23  2:23 PM    Specimen: Blood   Result Value Ref Range    Neutrophil % 61.0 42.7 - 76.0 %    Lymphocyte % 28.0 19.6 - 45.3 %    Monocyte % 6.0 5.0 - 12.0 %    Eosinophil % 1.0 0.3 - 6.2 %    Basophil % 3.0 (H) 0.0 - 1.5 %    Bands %  1.0 0.0 - 5.0 %    Neutrophils Absolute 4.48 1.70 - 7.00 10*3/mm3    Lymphocytes Absolute 2.02 0.70 - 3.10 10*3/mm3    Monocytes Absolute 0.43 0.10 - 0.90 10*3/mm3    Eosinophils Absolute 0.07 0.00 - 0.40 10*3/mm3    Basophils Absolute 0.22 (H) 0.00 - 0.20 10*3/mm3    RBC Morphology Normal Normal    WBC Morphology Normal Normal    Platelet Morphology Normal Normal   CBC Auto Differential    Collection Time: 03/09/23  2:23 PM    Specimen: Blood   Result Value Ref Range    WBC 7.23 3.40 - 10.80 10*3/mm3    RBC 4.80 4.14 - 5.80 10*6/mm3    Hemoglobin 15.5 13.0 - 17.7 g/dL    Hematocrit 45.0 37.5 - 51.0 %    MCV 93.8 79.0 - 97.0 fL    MCH 32.3 26.6 - 33.0 pg    MCHC 34.4 31.5 - 35.7 g/dL    RDW 13.3 12.3 - 15.4 %    RDW-SD 45.8 37.0 - 54.0 fl    MPV 11.5 6.0 - 12.0 fL    Platelets 145 140 - 450 10*3/mm3     Note: In addition to lab results from this visit, the labs listed above may include labs taken at another facility or during a different encounter within the last 24 hours. Please correlate lab times with ED admission and discharge times for further clarification of the services performed during this visit.    No orders to display     Vitals:    03/10/23 0000 03/10/23 0001 03/10/23 0200 03/10/23 0300   BP:       BP Location:       Patient Position:       Pulse: 67 67 63 68   Resp:       Temp:       TempSrc:       SpO2:  96% 98% 97%   Weight:       Height:         Medications   pantoprazole (PROTONIX) EC  tablet 40 mg (40 mg Oral Given 3/10/23 0516)   QUEtiapine (SEROquel) tablet 50 mg (50 mg Oral Given 3/9/23 2104)   sodium chloride 0.9 % flush 10 mL (10 mL Intravenous Given 3/9/23 2105)   sodium chloride 0.9 % flush 10 mL (has no administration in time range)   sodium chloride 0.9 % infusion 40 mL (has no administration in time range)   sodium chloride 0.9 % infusion (100 mL/hr Intravenous New Bag 3/10/23 0035)   ondansetron (ZOFRAN) tablet 4 mg (has no administration in time range)     Or   ondansetron (ZOFRAN) injection 4 mg (has no administration in time range)   thiamine (VITAMIN B-1) tablet 100 mg (has no administration in time range)     And   multivitamin (THERAGRAN) tablet 1 tablet (has no administration in time range)     And   folic acid (FOLVITE) tablet 1 mg (has no administration in time range)   LORazepam (ATIVAN) tablet 1 mg ( Oral Not Given:  See Alt 3/9/23 2114)     Or   LORazepam (ATIVAN) injection 1 mg (1 mg Intravenous Given 3/9/23 2114)     Or   LORazepam (ATIVAN) tablet 2 mg ( Oral Not Given:  See Alt 3/9/23 2114)     Or   LORazepam (ATIVAN) injection 2 mg ( Intravenous Not Given:  See Alt 3/9/23 2114)     Or   LORazepam (ATIVAN) injection 2 mg ( Intravenous Not Given:  See Alt 3/9/23 2114)     Or   LORazepam (ATIVAN) injection 2 mg ( Intramuscular Not Given:  See Alt 3/9/23 2114)   nicotine (NICODERM CQ) 21 MG/24HR patch 1 patch (1 patch Transdermal Medication Applied 3/9/23 0916)   diazePAM (VALIUM) tablet 5 mg (5 mg Oral Given 3/10/23 0516)   nicotine polacrilex (COMMIT) lozenge 2 mg (has no administration in time range)   traZODone (DESYREL) tablet 50 mg (50 mg Oral Given 3/9/23 2104)   carvedilol (COREG) tablet 6.25 mg (6.25 mg Oral Given 3/9/23 1716)   morphine injection 0.5 mg (0.5 mg Intravenous Given 3/9/23 2224)   thiamine (B-1) 100 mg, folic acid 1 mg in sodium chloride 0.9 % 1,000 mL infusion (0 mL/hr Intravenous Stopped 3/8/23 2208)   ondansetron (ZOFRAN) injection 4 mg (4 mg  Intravenous Given 3/8/23 2207)   ondansetron (ZOFRAN) injection 4 mg (4 mg Intravenous Given 3/9/23 0127)   LORazepam (ATIVAN) injection 1 mg (1 mg Intravenous Given 3/9/23 0251)     ECG/EMG Results (last 24 hours)     Procedure Component Value Units Date/Time    SCANNED - TELEMETRY   [[id number]] Resulted: 03/08/23     Updated: 03/09/23 0750    SCANNED - TELEMETRY   [[id number]] Resulted: 03/08/23     Updated: 03/09/23 1012        SCANNED - TELEMETRY     Final Result      SCANNED - TELEMETRY     Final Result              MDM    Final diagnoses:   History of alcoholism (HCC)   Alcoholic intoxication without complication (HCC)       ED Disposition  ED Disposition     ED Disposition   Decision to Admit    Condition   --    Comment   Level of Care: Telemetry [5]   Diagnosis: Alcohol withdrawal (HCC) [291.81.ICD-9-CM]   Admitting Physician: SHOLA DIAZ III [[id number]]   Attending Physician: SHOLA DIAZ III [[id number]]   Bed Request Comments: tele               No follow-up provider specified.       Medication List      No changes were made to your prescriptions during this visit.          Francisco Lynch MD  03/10/23 0602

## 2023-03-09 PROBLEM — F10.21 HISTORY OF ALCOHOLISM: Status: ACTIVE | Noted: 2023-03-09

## 2023-03-09 PROBLEM — F10.20 ETOH DEPENDENCE: Status: ACTIVE | Noted: 2023-03-09

## 2023-03-09 LAB
ANION GAP SERPL CALCULATED.3IONS-SCNC: 10 MMOL/L (ref 5–15)
BASOPHILS # BLD MANUAL: 0.22 10*3/MM3 (ref 0–0.2)
BASOPHILS NFR BLD MANUAL: 3 % (ref 0–1.5)
BUN SERPL-MCNC: 8 MG/DL (ref 6–20)
BUN/CREAT SERPL: 15.4 (ref 7–25)
CALCIUM SPEC-SCNC: 8.3 MG/DL (ref 8.6–10.5)
CHLORIDE SERPL-SCNC: 99 MMOL/L (ref 98–107)
CO2 SERPL-SCNC: 23 MMOL/L (ref 22–29)
CREAT SERPL-MCNC: 0.52 MG/DL (ref 0.76–1.27)
DEPRECATED RDW RBC AUTO: 45.8 FL (ref 37–54)
EGFRCR SERPLBLD CKD-EPI 2021: 134 ML/MIN/1.73
EOSINOPHIL # BLD MANUAL: 0.07 10*3/MM3 (ref 0–0.4)
EOSINOPHIL NFR BLD MANUAL: 1 % (ref 0.3–6.2)
ERYTHROCYTE [DISTWIDTH] IN BLOOD BY AUTOMATED COUNT: 13.3 % (ref 12.3–15.4)
GLUCOSE SERPL-MCNC: 144 MG/DL (ref 65–99)
HCT VFR BLD AUTO: 45 % (ref 37.5–51)
HGB BLD-MCNC: 15.5 G/DL (ref 13–17.7)
LYMPHOCYTES # BLD MANUAL: 2.02 10*3/MM3 (ref 0.7–3.1)
LYMPHOCYTES NFR BLD MANUAL: 6 % (ref 5–12)
MAGNESIUM SERPL-MCNC: 2.1 MG/DL (ref 1.6–2.6)
MCH RBC QN AUTO: 32.3 PG (ref 26.6–33)
MCHC RBC AUTO-ENTMCNC: 34.4 G/DL (ref 31.5–35.7)
MCV RBC AUTO: 93.8 FL (ref 79–97)
MONOCYTES # BLD: 0.43 10*3/MM3 (ref 0.1–0.9)
NEUTROPHILS # BLD AUTO: 4.48 10*3/MM3 (ref 1.7–7)
NEUTROPHILS NFR BLD MANUAL: 61 % (ref 42.7–76)
NEUTS BAND NFR BLD MANUAL: 1 % (ref 0–5)
PLAT MORPH BLD: NORMAL
PLATELET # BLD AUTO: 145 10*3/MM3 (ref 140–450)
PMV BLD AUTO: 11.5 FL (ref 6–12)
POTASSIUM SERPL-SCNC: 4.2 MMOL/L (ref 3.5–5.2)
RBC # BLD AUTO: 4.8 10*6/MM3 (ref 4.14–5.8)
RBC MORPH BLD: NORMAL
SODIUM SERPL-SCNC: 132 MMOL/L (ref 136–145)
VARIANT LYMPHS NFR BLD MANUAL: 28 % (ref 19.6–45.3)
WBC MORPH BLD: NORMAL
WBC NRBC COR # BLD: 7.23 10*3/MM3 (ref 3.4–10.8)

## 2023-03-09 PROCEDURE — 83735 ASSAY OF MAGNESIUM: CPT

## 2023-03-09 PROCEDURE — 85027 COMPLETE CBC AUTOMATED: CPT

## 2023-03-09 PROCEDURE — 25010000002 ONDANSETRON PER 1 MG: Performed by: EMERGENCY MEDICINE

## 2023-03-09 PROCEDURE — 25010000002 LORAZEPAM PER 2 MG: Performed by: INTERNAL MEDICINE

## 2023-03-09 PROCEDURE — 85007 BL SMEAR W/DIFF WBC COUNT: CPT

## 2023-03-09 PROCEDURE — 25010000002 MORPHINE PER 10 MG: Performed by: HOSPITALIST

## 2023-03-09 PROCEDURE — 80048 BASIC METABOLIC PNL TOTAL CA: CPT

## 2023-03-09 PROCEDURE — 25010000002 LORAZEPAM PER 2 MG: Performed by: STUDENT IN AN ORGANIZED HEALTH CARE EDUCATION/TRAINING PROGRAM

## 2023-03-09 PROCEDURE — 99222 1ST HOSP IP/OBS MODERATE 55: CPT | Performed by: INTERNAL MEDICINE

## 2023-03-09 RX ORDER — LORAZEPAM 2 MG/ML
1 INJECTION INTRAMUSCULAR
Status: DISCONTINUED | OUTPATIENT
Start: 2023-03-09 | End: 2023-03-12 | Stop reason: HOSPADM

## 2023-03-09 RX ORDER — LORAZEPAM 2 MG/ML
2 INJECTION INTRAMUSCULAR
Status: DISCONTINUED | OUTPATIENT
Start: 2023-03-09 | End: 2023-03-12 | Stop reason: HOSPADM

## 2023-03-09 RX ORDER — TRAZODONE HYDROCHLORIDE 50 MG/1
50 TABLET ORAL NIGHTLY
Status: DISCONTINUED | OUTPATIENT
Start: 2023-03-09 | End: 2023-03-12 | Stop reason: HOSPADM

## 2023-03-09 RX ORDER — ONDANSETRON 2 MG/ML
4 INJECTION INTRAMUSCULAR; INTRAVENOUS EVERY 6 HOURS PRN
Status: DISCONTINUED | OUTPATIENT
Start: 2023-03-09 | End: 2023-03-12 | Stop reason: HOSPADM

## 2023-03-09 RX ORDER — LORAZEPAM 2 MG/ML
1 INJECTION INTRAMUSCULAR EVERY 6 HOURS
Status: DISCONTINUED | OUTPATIENT
Start: 2023-03-09 | End: 2023-03-09

## 2023-03-09 RX ORDER — LORAZEPAM 2 MG/ML
1 INJECTION INTRAMUSCULAR ONCE
Status: COMPLETED | OUTPATIENT
Start: 2023-03-09 | End: 2023-03-09

## 2023-03-09 RX ORDER — SODIUM CHLORIDE 0.9 % (FLUSH) 0.9 %
10 SYRINGE (ML) INJECTION EVERY 12 HOURS SCHEDULED
Status: DISCONTINUED | OUTPATIENT
Start: 2023-03-09 | End: 2023-03-12 | Stop reason: HOSPADM

## 2023-03-09 RX ORDER — SODIUM CHLORIDE 9 MG/ML
100 INJECTION, SOLUTION INTRAVENOUS CONTINUOUS
Status: ACTIVE | OUTPATIENT
Start: 2023-03-09 | End: 2023-03-10

## 2023-03-09 RX ORDER — CARVEDILOL 6.25 MG/1
6.25 TABLET ORAL 2 TIMES DAILY WITH MEALS
Status: DISCONTINUED | OUTPATIENT
Start: 2023-03-09 | End: 2023-03-12 | Stop reason: HOSPADM

## 2023-03-09 RX ORDER — DIPHENOXYLATE HYDROCHLORIDE AND ATROPINE SULFATE 2.5; .025 MG/1; MG/1
1 TABLET ORAL DAILY
Status: COMPLETED | OUTPATIENT
Start: 2023-03-10 | End: 2023-03-12

## 2023-03-09 RX ORDER — ONDANSETRON 4 MG/1
4 TABLET, FILM COATED ORAL EVERY 6 HOURS PRN
Status: DISCONTINUED | OUTPATIENT
Start: 2023-03-09 | End: 2023-03-12 | Stop reason: HOSPADM

## 2023-03-09 RX ORDER — NICOTINE 21 MG/24HR
1 PATCH, TRANSDERMAL 24 HOURS TRANSDERMAL
Status: DISCONTINUED | OUTPATIENT
Start: 2023-03-09 | End: 2023-03-12 | Stop reason: HOSPADM

## 2023-03-09 RX ORDER — FOLIC ACID 1 MG/1
1 TABLET ORAL DAILY
Status: COMPLETED | OUTPATIENT
Start: 2023-03-10 | End: 2023-03-12

## 2023-03-09 RX ORDER — LORAZEPAM 1 MG/1
1 TABLET ORAL
Status: DISCONTINUED | OUTPATIENT
Start: 2023-03-09 | End: 2023-03-12 | Stop reason: HOSPADM

## 2023-03-09 RX ORDER — MORPHINE SULFATE 2 MG/ML
0.5 INJECTION, SOLUTION INTRAMUSCULAR; INTRAVENOUS
Status: DISCONTINUED | OUTPATIENT
Start: 2023-03-09 | End: 2023-03-12 | Stop reason: HOSPADM

## 2023-03-09 RX ORDER — LORAZEPAM 1 MG/1
2 TABLET ORAL
Status: DISCONTINUED | OUTPATIENT
Start: 2023-03-09 | End: 2023-03-12 | Stop reason: HOSPADM

## 2023-03-09 RX ORDER — QUETIAPINE FUMARATE 25 MG/1
50 TABLET, FILM COATED ORAL NIGHTLY
Status: DISCONTINUED | OUTPATIENT
Start: 2023-03-09 | End: 2023-03-12 | Stop reason: HOSPADM

## 2023-03-09 RX ORDER — ONDANSETRON 2 MG/ML
4 INJECTION INTRAMUSCULAR; INTRAVENOUS ONCE
Status: COMPLETED | OUTPATIENT
Start: 2023-03-09 | End: 2023-03-09

## 2023-03-09 RX ORDER — LORAZEPAM 2 MG/ML
1 INJECTION INTRAMUSCULAR EVERY 8 HOURS
Status: DISCONTINUED | OUTPATIENT
Start: 2023-03-10 | End: 2023-03-09

## 2023-03-09 RX ORDER — SODIUM CHLORIDE 9 MG/ML
40 INJECTION, SOLUTION INTRAVENOUS AS NEEDED
Status: DISCONTINUED | OUTPATIENT
Start: 2023-03-09 | End: 2023-03-12 | Stop reason: HOSPADM

## 2023-03-09 RX ORDER — POLYETHYLENE GLYCOL 3350 17 G
2 POWDER IN PACKET (EA) ORAL
Status: DISCONTINUED | OUTPATIENT
Start: 2023-03-09 | End: 2023-03-12 | Stop reason: HOSPADM

## 2023-03-09 RX ORDER — PANTOPRAZOLE SODIUM 40 MG/1
40 TABLET, DELAYED RELEASE ORAL
Status: DISCONTINUED | OUTPATIENT
Start: 2023-03-10 | End: 2023-03-12 | Stop reason: HOSPADM

## 2023-03-09 RX ORDER — SODIUM CHLORIDE 0.9 % (FLUSH) 0.9 %
10 SYRINGE (ML) INJECTION AS NEEDED
Status: DISCONTINUED | OUTPATIENT
Start: 2023-03-09 | End: 2023-03-12 | Stop reason: HOSPADM

## 2023-03-09 RX ORDER — DIAZEPAM 5 MG/1
5 TABLET ORAL EVERY 6 HOURS
Status: COMPLETED | OUTPATIENT
Start: 2023-03-09 | End: 2023-03-11

## 2023-03-09 RX ADMIN — LORAZEPAM 1 MG: 2 INJECTION INTRAMUSCULAR; INTRAVENOUS at 02:51

## 2023-03-09 RX ADMIN — LORAZEPAM 1 MG: 2 INJECTION INTRAMUSCULAR; INTRAVENOUS at 15:00

## 2023-03-09 RX ADMIN — CARVEDILOL 6.25 MG: 6.25 TABLET, FILM COATED ORAL at 17:16

## 2023-03-09 RX ADMIN — DIAZEPAM 5 MG: 5 TABLET ORAL at 17:16

## 2023-03-09 RX ADMIN — Medication 10 ML: at 21:05

## 2023-03-09 RX ADMIN — LORAZEPAM 1 MG: 2 INJECTION INTRAMUSCULAR; INTRAVENOUS at 21:14

## 2023-03-09 RX ADMIN — SODIUM CHLORIDE 100 ML/HR: 9 INJECTION, SOLUTION INTRAVENOUS at 14:17

## 2023-03-09 RX ADMIN — LORAZEPAM 1 MG: 2 INJECTION INTRAMUSCULAR; INTRAVENOUS at 12:53

## 2023-03-09 RX ADMIN — Medication 1 PATCH: at 09:16

## 2023-03-09 RX ADMIN — MORPHINE SULFATE 0.5 MG: 2 INJECTION, SOLUTION INTRAMUSCULAR; INTRAVENOUS at 22:24

## 2023-03-09 RX ADMIN — ONDANSETRON 4 MG: 2 INJECTION INTRAMUSCULAR; INTRAVENOUS at 01:27

## 2023-03-09 RX ADMIN — LORAZEPAM 1 MG: 2 INJECTION INTRAMUSCULAR; INTRAVENOUS at 09:03

## 2023-03-09 RX ADMIN — TRAZODONE HYDROCHLORIDE 50 MG: 50 TABLET ORAL at 21:04

## 2023-03-09 RX ADMIN — LORAZEPAM 1 MG: 2 INJECTION INTRAMUSCULAR; INTRAVENOUS at 05:23

## 2023-03-09 RX ADMIN — DIAZEPAM 5 MG: 5 TABLET ORAL at 21:04

## 2023-03-09 RX ADMIN — QUETIAPINE FUMARATE 50 MG: 25 TABLET ORAL at 21:04

## 2023-03-09 NOTE — CASE MANAGEMENT/SOCIAL WORK
Continued Stay Note  [facility]     Patient Name: Dung Knight  MRN: [id number]  Today's Date: 3/9/2023    Admit Date: 3/8/2023    Plan: Ongoing   Discharge Plan     Row Name 03/09/23 1359       Plan    Plan Ongoing    Plan Comments Consult received, thank you.  BAL on admission= 240. Zenaida reviewed- currently a 9.  I have seen this patient in the past- in November of 2020- he refused AUD treatment at that time. Will follow and attempt to engage AUD conversations when appropriate.               Discharge Codes    No documentation.               Expected Discharge Date and Time     Expected Discharge Date Expected Discharge Time    Mar 13, 2023             Sara Daniel RN  MA,BSN-  Addiction Coordinator

## 2023-03-09 NOTE — PROGRESS NOTES
[facility] Medicine Services  ADMISSION FOLLOW-UP NOTE          Patient admitted after midnight, H&P by my partner performed earlier on today's date reviewed.  Interim findings, labs, and charting also reviewed.        The [facility] Hospital Problem List has been managed and updated to include any new diagnoses:  Active Hospital Problems    Diagnosis  POA   • **EtOH dependence (HCC) [F10.20]  Unknown   • History of alcoholism (HCC) [F10.21]  Not Applicable   • Tobacco use disorder [F17.200]  Yes   • Essential hypertension [I10]  Yes   • Alcohol withdrawal seizure (HCC) [F10.939, R56.9]  Yes   • Alcoholic hepatitis [K70.10]  Yes      Resolved Hospital Problems   No resolved problems to display.         ADDITIONAL PLAN:  - detailed assessment and plan from admission reviewed  - change ativan to Valium scheduled based on history of alcohol withdrawal seizures and DTs  - changes half-life from ~ 14 hours to ~ 30 hours  - he has erythrocytosis concerning for intermittent hypoxia  - he is a smoker of cigarettes, starting at a young age and smoking perhaps 2 and 1/2 ppd  - in an effort to supplement nicotine while here and prevent withdrawal he needs more than one patch  - nicotine patch 21 mg + PRN nicotine added today  - he says he has a history of Alcoholic Hepatitis    Chemical Dependency Team Consultation today    Expected Discharge  Expected Discharge Date and Time     Expected Discharge Date Expected Discharge Time    Mar 13, 2023            Simon Gold MD  03/09/23

## 2023-03-09 NOTE — H&P
"    [facility] Medicine Services  HISTORY AND PHYSICAL    Patient Name: Dung Knight  : 1985  MRN: [id number]  Primary Care Physician: Han Reyes APRN  Date of admission: 3/8/2023    Subjective   Subjective     Chief Complaint:  ETOH    HPI:  Dung Knight is a 37 y.o. male with PMH of alcoholic hepatitis, HTN and tobacco abuse who presents to the ED for alcohol detox. He wanted to go to an inpatient facility, however they wouldn't take him until he was done withdrawaling. Patient states he drinks 18 tall boys throughout the day. He drinks another 6 malt liquors throughout the night to get through work without \"the shakes.\" He states he starts to withdrawal 2-3 hours after his last drink. His last drink was in the parking lot before he came into the ED. Patient has a history of withdrawal seizures.         Review of Systems   Constitutional: Positive for appetite change. Negative for activity change, fatigue and fever.   HENT: Negative for congestion, sore throat and trouble swallowing.    Eyes: Negative.    Respiratory: Negative for chest tightness and shortness of breath.    Cardiovascular: Negative for chest pain and leg swelling.   Gastrointestinal: Positive for abdominal pain, nausea and vomiting. Negative for abdominal distention and diarrhea.   Endocrine: Negative.    Genitourinary: Negative for difficulty urinating and dysuria.   Musculoskeletal: Negative.    Skin: Negative.    Neurological: Positive for tremors. Negative for dizziness and headaches.   Hematological: Negative.    Psychiatric/Behavioral: Negative for agitation and confusion.          Personal History     Past Medical History:   Diagnosis Date   • Asthma    • Hypertension    • Scoliosis    • Seizures (HCC)              Past Surgical History:   Procedure Laterality Date   • COLONOSCOPY     • HERNIA REPAIR     • TESTICLE SURGERY         Family History:  family history includes Arthritis in his father and " mother.     Social History:  reports that he has been smoking cigarettes. He started smoking about 19 years ago. He has been smoking an average of 3.5 packs per day. He has never used smokeless tobacco. He reports current alcohol use of about 21.0 standard drinks per week. He reports that he does not currently use drugs after having used the following drugs: Marijuana.  Social History     Social History Narrative   • Not on file       Medications:  HYDROcodone-acetaminophen, QUEtiapine, acetaminophen, albuterol sulfate HFA, bacitracin, bacitracin-polymyxin b, carvedilol, erythromycin, folic acid, ibuprofen, multivitamin with minerals, omeprazole, ondansetron, thiamine, and traZODone    Allergies   Allergen Reactions   • Penicillins Unknown (See Comments)     Unknown reaction       Objective   Objective     Vital Signs:   Temp:  [98.6 °F (37 °C)] 98.6 °F (37 °C)  Heart Rate:  [] 83  Resp:  [16] 16  BP: (100-150)/() 114/73    Physical Exam  Constitutional:       General: He is not in acute distress.     Appearance: He is normal weight. He is ill-appearing.   HENT:      Head: Normocephalic.      Nose: Nose normal. No congestion.      Mouth/Throat:      Mouth: Mucous membranes are moist.   Eyes:      Extraocular Movements: Extraocular movements intact.      Pupils: Pupils are equal, round, and reactive to light.   Cardiovascular:      Rate and Rhythm: Normal rate and regular rhythm.      Pulses: Normal pulses.      Heart sounds: Normal heart sounds. No murmur heard.  Pulmonary:      Effort: Pulmonary effort is normal. No respiratory distress.      Breath sounds: Normal breath sounds. No wheezing or rhonchi.   Abdominal:      General: Bowel sounds are normal.      Palpations: Abdomen is soft.      Tenderness: There is abdominal tenderness.   Musculoskeletal:         General: No swelling. Normal range of motion.      Cervical back: Normal range of motion. No rigidity.   Skin:     General: Skin is warm.       Capillary Refill: Capillary refill takes less than 2 seconds.   Neurological:      General: No focal deficit present.      Mental Status: He is alert and oriented to person, place, and time. Mental status is at baseline.      Motor: No weakness.   Psychiatric:         Mood and Affect: Mood normal.         Behavior: Behavior normal.         Thought Content: Thought content normal.         Judgment: Judgment normal.          Result Review:  I have personally reviewed the results from the time of this admission to 3/9/2023 03:10 EST and agree with these findings:  [x]  Laboratory list / accordion  [x]  Microbiology  []  Radiology  [x]  EKG/Telemetry   []  Cardiology/Vascular   []  Pathology  [x]  Old records  []  Other:  Most notable findings include:     LAB RESULTS:      Lab 03/08/23  1823   WBC 10.35   HEMOGLOBIN 18.4*   HEMATOCRIT 52.6*   PLATELETS 181   NEUTROS ABS 7.07*   IMMATURE GRANS (ABS) 0.05   LYMPHS ABS 2.22   MONOS ABS 0.73   EOS ABS 0.16   MCV 94.4         Lab 03/08/23  1823   SODIUM 132*   POTASSIUM 4.4   CHLORIDE 94*   CO2 24.0   ANION GAP 14.0   BUN 6   CREATININE 0.73*   EGFR 120.9   GLUCOSE 120*   CALCIUM 8.5*   MAGNESIUM 2.4         Lab 03/08/23  1823   TOTAL PROTEIN 7.3   ALBUMIN 4.6   GLOBULIN 2.7   ALT (SGPT) 138*   AST (SGOT) 107*   BILIRUBIN 0.3   ALK PHOS 84                     Brief Urine Lab Results     None        Microbiology Results (last 10 days)     ** No results found for the last 240 hours. **          No radiology results from the last 24 hrs        Assessment & Plan   Assessment & Plan       EtOH dependence (HCC)    Alcohol withdrawal seizure (HCC)    Alcoholic hepatitis    Essential hypertension    Tobacco use disorder    Dung Knight is a 37 y.o. male with PMH of alcoholic hepatitis, HTN and tobacco abuse who presents to the ED for alcohol detox.    ETOH Dependence  Hx of Withdrawal Seizures  Alcoholic Hepatitis   -CIWA protocol  -Seizure precautions  -Continue home  "seroquel  -Maintenance fluids  -Folic acid and thiamine  -Low threshold for ICU    HTN  -Hold home coreg for now    Tobacco Abuse  -Nicotine patch  -Encourage cessation    DVT prophylaxis:  SCDs    CODE STATUS:  FULL       Expected Discharge  TBD        This note has been completed as part of a split-shared workflow.     Signature: Electronically signed by BLAINE Jade, 03/09/23, 3:27 AM EST.    Total APC Time: 30 minutes      Attending   Admission Attestation       I have performed an independent face-to-face diagnostic evaluation including performing an independent physical examination as documented here.  The documented plan of care above was reviewed and developed with the advanced practice clinician (APC).      Brief Summary Statement:   Dung Knight is a 37 y.o. male who states that he drinks \"18 tall boy beers\" and 5-6 Moler drinks every day.  He states he drinks the malt liquor during the night to get through his job \"without getting shaky.\"  He states he is at the point where after his last drink during a binge, he begins to have withdrawal symptoms just 2 hours afterwards.  He states he wishes to go to an inpatient rehab facility.  His last alcohol intake was tonight, in the parking lot before he came into the ED to be evaluated.  He does have history of withdrawal seizures but no seizure activity is noted today.  ED physician states the patient was going to be sent to an inpatient facility tonight but they deemed him as too far into withdrawal to be moved tonight, so he will be admitted to the internal medicine/hospitalist service.  He denies chest pain, shortness of breath, fever/chills, slurred speech/facial droop, hallucinations, or syncope.  He states he did have some emesis earlier while in the ED but he received IV Ativan which has relieved some anxiety and his nausea.    Remainder of detailed HPI is as noted by APC and has been reviewed and/or edited by me for completeness.    Attending " Physical Exam:  Temp:  [98.6 °F (37 °C)] 98.6 °F (37 °C)  Heart Rate:  [] 83  Resp:  [16] 16  BP: (100-150)/() 114/73    Constitutional: Awake, alert, NAD.  Eyes: PERRLA, sclerae anicteric, no conjunctival injection  HENT: NCAT, mucous membranes dry.  Neck: Supple, no thyromegaly, no lymphadenopathy, trachea midline  Respiratory: Clear to auscultation bilaterally, nonlabored respirations   Cardiovascular: RRR, no murmurs, rubs, or gallops, palpable pedal pulses bilaterally  Gastrointestinal: Positive bowel sounds, soft, mild diffuse TTP, nondistended  Musculoskeletal: No bilateral ankle edema, no clubbing or cyanosis to extremities  Psychiatric: Appropriate affect, cooperative  Neurologic: Oriented x 3, strength symmetric in all extremities, Cranial Nerves grossly intact to confrontation, speech clear  Skin: No rashes, normal turgor.    Brief Assessment/Plan :  See detailed assessment and plan developed with APC which I have reviewed and/or edited for completeness.    Total time spent: 25 minutes  Time spent includes time reviewing chart, face-to-face time, counseling patient/family/caregiver, ordering medications/tests/procedures, communicating with other health care professionals, documenting clinical information in the electronic health record, and coordination of care.        Jarett Turner III, DO  03/09/23

## 2023-03-09 NOTE — PAYOR COMM NOTE
"Ref# [id number]  Hospital Admission    Utilization Review  Phone [phone]  Fax [phone]    [address] [address]      Dung King (37 y.o. Male)     Date of Birth   1985    Social Security Number       Address   [address] [place], [place] [address]    Home Phone   [phone]    MRN   [id number]       Sikh   None    Marital Status   Single                            Admission Date   3/8/23    Admission Type   Emergency    Admitting Provider   Jarett Turner III, DO    Attending Provider   Simon Gold MD    Department, Room/Bed   [facility] 4H, S472/1       Discharge Date       Discharge Disposition       Discharge Destination                               Attending Provider: Simon Gold MD    Allergies: Penicillins    Isolation: None   Infection: None   Code Status: CPR    Ht: 170.2 cm (67\")   Wt: 81.6 kg (180 lb)    Admission Cmt: None   Principal Problem: EtOH dependence (HCC) [F10.20]                 Active Insurance as of 3/8/2023     Primary Coverage     Payor Plan Insurance Group Employer/Plan Group    ANTHEM BLUE CROSS ANTHEM BLUE CROSS BLUE SHIELD PPO [id number]     Payor Plan Address Payor Plan Phone Number Payor Plan Fax Number Effective Dates    [address] [phone]  5/1/2021 - None Entered    [facility] [address]       Subscriber Name Subscriber Birth Date Member ID       DUNG KING 1985 [id number]           Secondary Coverage     Payor Plan Insurance Group Employer/Plan Group    [place] [id number]     Payor Plan Address Payor Plan Phone Number Payor Plan Fax Number Effective Dates    [employer] [address] [address] [phone]  7/1/2020 - None Entered    [facility] [id number]       Subscriber Name Subscriber Birth Date Member ID       DUNG KING 1985 [id number]                 Emergency Contacts      (Rel.) Home Phone Work Phone Mobile Phone    " "Noah Knight (Significant Other) -- -- [phone]    Alphonso Knight (Father) [phone] -- --    CsottErnesto (Step Parent) -- -- [phone]            [place]: [facility] [id number] Tax ID [id number]  Insurance Information                ANTHEM BLUE CROSS/ANTHEM BLUE CROSS BLUE SHIELD PPO Phone: [phone]    Subscriber: Dung Knight Subscriber#: [id number]    Group#: [id number] Precert#: [id number]        [place] Phone: [phone]    Subscriber: Dung Knight Subscriber#: [id number]    Group#: [id number] Precert#: --             History & Physical      Jarett Turner III, DO at 23 0310              [facility] Medicine Services  HISTORY AND PHYSICAL    Patient Name: Dung Knight  : 1985  MRN: [id number]  Primary Care Physician: Han Reyes, BLAINE  Date of admission: 3/8/2023    Subjective    Subjective     Chief Complaint:  ETOH    HPI:  Dung Knight is a 37 y.o. male with PMH of alcoholic hepatitis, HTN and tobacco abuse who presents to the ED for alcohol detox. He wanted to go to an inpatient facility, however they wouldn't take him until he was done withdrawaling. Patient states he drinks 18 tall boys throughout the day. He drinks another 6 malt liquors throughout the night to get through work without \"the shakes.\" He states he starts to withdrawal 2-3 hours after his last drink. His last drink was in the parking lot before he came into the ED. Patient has a history of withdrawal seizures.         Review of Systems   Constitutional: Positive for appetite change. Negative for activity change, fatigue and fever.   HENT: Negative for congestion, sore throat and trouble swallowing.    Eyes: Negative.    Respiratory: Negative for chest tightness and shortness of breath.    Cardiovascular: Negative for chest pain and leg swelling.   Gastrointestinal: Positive for abdominal pain, nausea and vomiting. Negative for abdominal distention and " diarrhea.   Endocrine: Negative.    Genitourinary: Negative for difficulty urinating and dysuria.   Musculoskeletal: Negative.    Skin: Negative.    Neurological: Positive for tremors. Negative for dizziness and headaches.   Hematological: Negative.    Psychiatric/Behavioral: Negative for agitation and confusion.          Personal History     Past Medical History:   Diagnosis Date   • Asthma    • Hypertension    • Scoliosis    • Seizures (HCC)              Past Surgical History:   Procedure Laterality Date   • COLONOSCOPY     • HERNIA REPAIR     • TESTICLE SURGERY         Family History:  family history includes Arthritis in his father and mother.     Social History:  reports that he has been smoking cigarettes. He started smoking about 19 years ago. He has been smoking an average of 3.5 packs per day. He has never used smokeless tobacco. He reports current alcohol use of about 21.0 standard drinks per week. He reports that he does not currently use drugs after having used the following drugs: Marijuana.  Social History     Social History Narrative   • Not on file       Medications:  HYDROcodone-acetaminophen, QUEtiapine, acetaminophen, albuterol sulfate HFA, bacitracin, bacitracin-polymyxin b, carvedilol, erythromycin, folic acid, ibuprofen, multivitamin with minerals, omeprazole, ondansetron, thiamine, and traZODone    Allergies   Allergen Reactions   • Penicillins Unknown (See Comments)     Unknown reaction       Objective    Objective     Vital Signs:   Temp:  [98.6 °F (37 °C)] 98.6 °F (37 °C)  Heart Rate:  [] 83  Resp:  [16] 16  BP: (100-150)/() 114/73    Physical Exam  Constitutional:       General: He is not in acute distress.     Appearance: He is normal weight. He is ill-appearing.   HENT:      Head: Normocephalic.      Nose: Nose normal. No congestion.      Mouth/Throat:      Mouth: Mucous membranes are moist.   Eyes:      Extraocular Movements: Extraocular movements intact.      Pupils: Pupils  are equal, round, and reactive to light.   Cardiovascular:      Rate and Rhythm: Normal rate and regular rhythm.      Pulses: Normal pulses.      Heart sounds: Normal heart sounds. No murmur heard.  Pulmonary:      Effort: Pulmonary effort is normal. No respiratory distress.      Breath sounds: Normal breath sounds. No wheezing or rhonchi.   Abdominal:      General: Bowel sounds are normal.      Palpations: Abdomen is soft.      Tenderness: There is abdominal tenderness.   Musculoskeletal:         General: No swelling. Normal range of motion.      Cervical back: Normal range of motion. No rigidity.   Skin:     General: Skin is warm.      Capillary Refill: Capillary refill takes less than 2 seconds.   Neurological:      General: No focal deficit present.      Mental Status: He is alert and oriented to person, place, and time. Mental status is at baseline.      Motor: No weakness.   Psychiatric:         Mood and Affect: Mood normal.         Behavior: Behavior normal.         Thought Content: Thought content normal.         Judgment: Judgment normal.          Result Review:  I have personally reviewed the results from the time of this admission to 3/9/2023 03:10 EST and agree with these findings:  [x]  Laboratory list / accordion  [x]  Microbiology  []  Radiology  [x]  EKG/Telemetry   []  Cardiology/Vascular   []  Pathology  [x]  Old records  []  Other:  Most notable findings include:     LAB RESULTS:      Lab 03/08/23  1823   WBC 10.35   HEMOGLOBIN 18.4*   HEMATOCRIT 52.6*   PLATELETS 181   NEUTROS ABS 7.07*   IMMATURE GRANS (ABS) 0.05   LYMPHS ABS 2.22   MONOS ABS 0.73   EOS ABS 0.16   MCV 94.4         Lab 03/08/23  1823   SODIUM 132*   POTASSIUM 4.4   CHLORIDE 94*   CO2 24.0   ANION GAP 14.0   BUN 6   CREATININE 0.73*   EGFR 120.9   GLUCOSE 120*   CALCIUM 8.5*   MAGNESIUM 2.4         Lab 03/08/23  1823   TOTAL PROTEIN 7.3   ALBUMIN 4.6   GLOBULIN 2.7   ALT (SGPT) 138*   AST (SGOT) 107*   BILIRUBIN 0.3   ALK PHOS 84  "                    Brief Urine Lab Results     None        Microbiology Results (last 10 days)     ** No results found for the last 240 hours. **          No radiology results from the last 24 hrs        Assessment & Plan   Assessment & Plan       EtOH dependence (HCC)    Alcohol withdrawal seizure (HCC)    Alcoholic hepatitis    Essential hypertension    Tobacco use disorder    Dung Knight is a 37 y.o. male with PMH of alcoholic hepatitis, HTN and tobacco abuse who presents to the ED for alcohol detox.    ETOH Dependence  Hx of Withdrawal Seizures  Alcoholic Hepatitis   -CIWA protocol  -Seizure precautions  -Continue home seroquel  -Maintenance fluids  -Folic acid and thiamine  -Low threshold for ICU    HTN  -Hold home coreg for now    Tobacco Abuse  -Nicotine patch  -Encourage cessation    DVT prophylaxis:  SCDs    CODE STATUS:  FULL       Expected Discharge  TBD        This note has been completed as part of a split-shared workflow.     Signature: Electronically signed by BLAINE Jade, 03/09/23, 3:27 AM EST.    Total APC Time: 30 minutes      Attending   Admission Attestation       I have performed an independent face-to-face diagnostic evaluation including performing an independent physical examination as documented here.  The documented plan of care above was reviewed and developed with the advanced practice clinician (APC).      Brief Summary Statement:   Dung Knight is a 37 y.o. male who states that he drinks \"18 tall boy beers\" and 5-6 Moler drinks every day.  He states he drinks the malt liquor during the night to get through his job \"without getting shaky.\"  He states he is at the point where after his last drink during a binge, he begins to have withdrawal symptoms just 2 hours afterwards.  He states he wishes to go to an inpatient rehab facility.  His last alcohol intake was tonight, in the parking lot before he came into the ED to be evaluated.  He does have history of withdrawal seizures but no " seizure activity is noted today.  ED physician states the patient was going to be sent to an inpatient facility tonight but they deemed him as too far into withdrawal to be moved tonight, so he will be admitted to the internal medicine/hospitalist service.  He denies chest pain, shortness of breath, fever/chills, slurred speech/facial droop, hallucinations, or syncope.  He states he did have some emesis earlier while in the ED but he received IV Ativan which has relieved some anxiety and his nausea.    Remainder of detailed HPI is as noted by APC and has been reviewed and/or edited by me for completeness.    Attending Physical Exam:  Temp:  [98.6 °F (37 °C)] 98.6 °F (37 °C)  Heart Rate:  [] 83  Resp:  [16] 16  BP: (100-150)/() 114/73    Constitutional: Awake, alert, NAD.  Eyes: PERRLA, sclerae anicteric, no conjunctival injection  HENT: NCAT, mucous membranes dry.  Neck: Supple, no thyromegaly, no lymphadenopathy, trachea midline  Respiratory: Clear to auscultation bilaterally, nonlabored respirations   Cardiovascular: RRR, no murmurs, rubs, or gallops, palpable pedal pulses bilaterally  Gastrointestinal: Positive bowel sounds, soft, mild diffuse TTP, nondistended  Musculoskeletal: No bilateral ankle edema, no clubbing or cyanosis to extremities  Psychiatric: Appropriate affect, cooperative  Neurologic: Oriented x 3, strength symmetric in all extremities, Cranial Nerves grossly intact to confrontation, speech clear  Skin: No rashes, normal turgor.    Brief Assessment/Plan :  See detailed assessment and plan developed with APC which I have reviewed and/or edited for completeness.    Total time spent: 25 minutes  Time spent includes time reviewing chart, face-to-face time, counseling patient/family/caregiver, ordering medications/tests/procedures, communicating with other health care professionals, documenting clinical information in the electronic health record, and coordination of care.        Jarett  Lam Turner III,   03/09/23                        Electronically signed by Jarett Turner III, DO at 03/09/23 0448       Emergency Department Notes    No notes of this type exist for this encounter.         CIWA (last day)     Date/Time CIWA-Ar Score    03/09/23 1248 9    03/09/23 0800 6    03/09/23 0505 7    03/09/23 04:31:11 5    03/09/23 01:57:59 14    03/08/23 21:19:39 4    03/08/23 18:54:37 3        Physician Progress Notes (last 24 hours)  Notes from 03/08/23 1249 through 03/09/23 1249   No notes of this type exist for this encounter.            Consult Notes (last 24 hours)      Kacy Moore, [facility] at 03/09/23 0218      Consult Orders    1. Psych / Access to See [[id number]] ordered by Francisco Lynch MD at 03/08/23 1803               Dung Knight  1985     “This provider is located at the [facility] located at [address] [place] [address], [facility] using a secure Zoom Video Visit. Patient is being seen remotely via telehealth at [address] [place] [address], [address], and stated they are in a secure environment for this session. The patient's condition being diagnosed/treated is appropriate for telemedicine. The provider identified themselves as well as their credentials.   The patient, and/or patients guardian, consent to be seen remotely, and when consent is given they understand that the consent allows for patient identifiable information to be sent to a third party as needed.   They may refuse to be seen remotely at any time. The electronic data is encrypted and password protected, and the patient and/or guardian has been advised of the potential risks to privacy not withstanding such measures.”    Preferred Pronouns: he/him    Assessment Start and End: 225-245    Orientation: alert and oriented to person, place, and time     Is patient agreeable to admission/treatment? Yes    Guardian Name/Contact/etc: self    Pt Lives With:  Lives with significant  "other in [place], [place]    Highest Level of Education: high school diploma/GED     Presenting Problems: Patient presents to ED seeking medical detox from alcohol. Upon ED arrival, patient ETOH level was 240. Patient reports drinking 18 tall boys and 6 pack of Carlitos's Hard Lemonade every day. Patient states he has been drinking this much for 3 years. Patient states he doesn't go longer than 3 hours without a drink. Patient is observed dry heaving during assessment and he reports shaking, eye sensitivity, tactile disturbances, head pain, and anxiety. Patient reports history of DT's and seizure. Patient denies HI/SI.     Current Stressors: employment, finances    Depression: 8     Hopelessness: Moderate    Anxiety: 8    Sleep: Poor Patient states, \"I have to be highly medicated or drunk to sleep.\"    Appetite: Poor    Delusions: Patient presents with linear thought process.     Hallucinations: None    Homicidal Ideations: Absent     Current Mental Healthcare: Patient denies current engagement in outpatient mental health treatment.     Current Psychiatric Medications: N/A    Hx of Psychiatric Treatment: Patient denies history of psychiatric admissions. He reports hx of medical detox at [facility] (2013) and [facility].        COL[place]IA-SUICIDE SEVERITY RATING SCALE  Psychiatric Inpatient Setting - Discharge Screener    Ask questions that are bold and underlined Discharge   Ask Questions 1 and 2 YES NO   1) Wish to be Dead:   Person endorses thoughts about a wish to be dead or not alive anymore, or wish to fall asleep and not wake up.  While you were here in the hospital, have you wished you were dead or wished you could go to sleep and not wake up?  X   2) Suicidal Thoughts:   General non-specific thoughts of wanting to end one's life/die by suicide, “I've thought about killing myself” without general thoughts of ways to kill oneself/associated methods, intent, or plan.   While you were here in the hospital, have you actually had " thoughts about killing yourself?   X   If YES to 2, ask questions 3, 4, 5, and 6.  If NO to 2, go directly to question 6   3) Suicidal Thoughts with Method (without Specific Plan or Intent to Act):   Person endorses thoughts of suicide and has thought of a least one method during the assessment period. This is different than a specific plan with time, place or method details worked out. “I thought about taking an overdose but I never made a specific plan as to when where or how I would actually do it….and I would never go through with it.”   Have you been thinking about how you might kill yourself?      4) Suicidal Intent (without Specific Plan):   Active suicidal thoughts of killing oneself and patient reports having some intent to act on such thoughts, as opposed to “I have the thoughts but I definitely will not do anything about them.”   Have you had these thoughts and had some intention of acting on them or do you have some intention of acting on them after you leave the hospital?      5) Suicide Intent with Specific Plan:   Thoughts of killing oneself with details of plan fully or partially worked out and person has some intent to carry it out.   Have you started to work out or worked out the details of how to kill yourself either for while you were here in the hospital or for after you leave the hospital? Do you intend to carry out this plan?        6) Suicide Behavior    While you were here in the hospital, have you done anything, started to do anything, or prepared to do anything to end your life?    Examples: Took pills, cut yourself, tried to hang yourself, took out pills but didn't swallow any because you changed your mind or someone took them from you, collected pills, secured a means of obtaining a gun, gave away valuables, wrote a will or suicide note, etc.  X     Suicidal: Absent    Previous Attempts: no prior suicide attempts    Most Recent Attempt: N/A    PSYCHOSOCIAL HISTORY:    Family Hx of Mental  Health/Substance Abuse: Bipolar disorder, depression, PTSD, anxiety, and substance abuse    Patient Trauma/Abuse History: Patient does not disclose trauma history during assessment.     Does this require reporting: N/A    Legal History / History of Violence: The patient has no current pending legal charges. The patient has no history of significant violence.      History of Inappropriate Sexual Behavior: No      SUBSTANCE USE HISTORY:    Substance Use History:  Patient states he started drinking when he was 20 years old. Patient reports drinking 18 tall boys and 6 pack of Carlitos's Hard Lemonade every day. Patient states he has been drinking this much for 3 years. Patient states he doesn't go longer than 3 hours without a drink. Patient is observed dry heaving during assessment and he reports shaking, eye sensitivity, tactile disturbances, head pain, and anxiety. Patient reports history of DT's and seizure.    WITHDRAWAL:      [facility] Withdrawal Assessment of Alcohol Scale    Date: 3/9/23 Time: 235    Pulse or heart rate, taken for one minute: 83 Blood pressure: 114/73    NAUSEA AND VOMITING--Ask “Do you fell sick to your stomach? Have you vomited?” Observatoin.  0 no nausea and no vomiting  1 mild nausea with no vomiting  2  3  4 intermittent nausea with dry heaves  5  6  7 constant nausea, frequent dry heaves and vomiting    TREMOR--Arms extended and fingers spread apart.  Observation  0 no tremor  1 not visible, but can be felt fingertip to fingertip  2  3  4 moderate, with patient's arms extended  5  6  7 severe, even with arms not extended    PAROXYSMAL SWEATS--Observation  0 no sweat visible  1 barely perceptible sweating, palms moist  2  3  4 beads of sweat obvious on forehead  5  6  7 drenching sweats    ANXIETY--Ask “Do you feel nervous?” Observation.  0 no anxiety, at ease  1 mild anxious  2  3  4 moderately anxious, or guarded, so anxiety is inferred  5  6  7 equivalent to acute panic states as  seen in severe delirium or acute schizophrenic reactions    TACTILE DISTURBANCES--Ask “Have you any itching, pins and needles sensations, any burning, any numbness, or do you feel bugs crawling on or under your skin?” Observation.  0 none  1 very mild itching, pins and needles, burning or numbness  2 mild itching, pins and needles, burning or numbness  3 moderate itching, pins and needles, burning or numbness  4 moderately severe hallucinations  5 severe hallucinations  6 extremely severe hallucinations  7 continuous hallucinations  AUDITORY DISTURBANCES--Ask “Are you more aware of sounds around you ? Are they harsh? Do they frighten you?  Are you hearing anything that is disturbing to you?  Are you hearing things you know are not there? Observation.  0 not present  1 very mild harshness or ability to frighten  2 mild harshness or ability to frighten  3 moderate harshness or ability to frighten  4 moderately severe hallucinations  5 severe hallucinations  6 extremely severe hallucinations  7 continuous hallucinations       VISUAL DISTURBANCES--Ask “Does the light appear to be too bright? Is its color different? Does it hurt your eyes?  Are you seeing anything that is disturbing to you? Are you seeing things you know are not there?' Observation  0 not present  1 very mild sensitivity  2 mild sensitivity  3 moderate sensitivity  4 moderately severe hallucinations  5 severe hallucinations  6 extremely severe hallucinations  7 continuous hallucinations    HEADACHE, FULLNESS IN HEAD--Ask “Does your head feel different? Does it feel like there is a band around your head?” Do not rate for dizziness or lightheadedness.  Otherwise, rate severity.  0 not present  1 very mild  2 mild  3 moderate  4 moderately severe  5 severe  6 very severe  7 extremely severe    AGITATION--Observation  0 normal activity  1 somewhat more than normal activity  2   3  4 moderately fidgety and restless  5  6  7 paces back and forth during most of  the interview, or constantly thrashes about    ORIENTATION AND CLOUDING OF SENSORIUM--Ask   “What day is this? Where are you? Who am I?  0 oriented and can do serial additions  1 cannot do serial additions or is uncertain about date  2 disoriented for date by no more than 2 calendar days  3 disoriented for date by more than 2 calendar days  4 disoriented for place/or person        Total CIWA-Ar Score: 20   Rater's Initials:   Maximum Possible Score 67    History of DT's: Yes    History of Seizures: Yes    Current Medical Conditions or Biomedical Complications: AUD      DATA:   This therapist received a call from LEANNA Sparks for a behavioral health consult.  The patient is agreeable to speak with the behavioral health team.  Met with patient at bedside. Patient is not under 1:1 security monitoring during assessment.  Patient is a 37 year old, not , , male residing in [place], [place]. Patient currently lives with girlfriend.  Patient is employed.     Patient presents to ED seeking medical detox from alcohol. Upon ED arrival, patient ETOH level was 240. Patient reports drinking 18 tall boys and 6 pack of Carlitos's Hard Lemonade every day. Patient states he has been drinking this much for 3 years. Patient states he doesn't go longer than 3 hours without a drink. Patient is observed dry heaving during assessment and he reports shaking, eye sensitivity, tactile disturbances, head pain, and anxiety. Patient reports history of DT's and seizure. Patient states he has been planning on getting help for awhile. He states he is trying to prevent from dying. Patient denies HI/SI.     Safety plan of report to nearest hospital, or call police/911 if feeling unsafe, if having suicidal or homicidal thoughts, or if in emergent need of medications verbally reviewed with patient during assessment and suicide prevention/crisis hotlines verbally reviewed with patient during assessment.  Patient during assessment verbally  agreed to safety plan. Patient reports to be agreeable for treatment recommendations.     ASSESSMENT:    Therapist completed CSSRS with patient for suicide risk assessment.  The results of patient’s CSSRS suggest that patient is denying death wish, SI, plan and intent.  Patient holds attention and is Cooperative with assessment.  Patient’s appearance is disheveled.  The patient displays Appropriate psychomotor behavior. The patient's affect appears normal. The patient is observed to have normal rate, tone and rhythm of speech.   Patient observed to have Fair eye contact. The patient's displays fair insight, with poor impulse control and fair judgement.     PLAN:    At this time, patient is requesting medical detox. I agree with this based on patient's current presentation/symptoms/CIWA and history of DT's and seizure. Patient is requesting to detox at [facility]. Therapist notified Dr. Bruno.     SHAGGY Berry 3/9/23                Electronically signed by Kacy Moore LPCC at 03/09/23 0311

## 2023-03-09 NOTE — CASE MANAGEMENT/SOCIAL WORK
Discharge Planning Assessment  [facility]     Patient Name: Dung Knight  MRN: [id number]  Today's Date: 3/9/2023    Admit Date: 3/8/2023    Plan: Home   Discharge Needs Assessment     Row Name 03/09/23 1409       Living Environment    People in Home spouse    Name(s) of People in Home Noah Knight    Current Living Arrangements home    Primary Care Provided by self    Provides Primary Care For no one    Family Caregiver if Needed spouse    Family Caregiver Names Noah Knight    Quality of Family Relationships unable to assess    Able to Return to Prior Arrangements yes       Resource/Environmental Concerns    Resource/Environmental Concerns none       Transition Planning    Patient/Family Anticipated Services at Transition none    Transportation Anticipated family or friend will provide       Discharge Needs Assessment    Readmission Within the Last 30 Days no previous admission in last 30 days    Equipment Currently Used at Home none    Concerns to be Addressed discharge planning;denies needs/concerns at this time;substance/tobacco abuse/use    Anticipated Changes Related to Illness none    Equipment Needed After Discharge none    Current Discharge Risk substance use/abuse    Discharge Coordination/Progress Home               Discharge Plan     Row Name 03/09/23 1411       Plan    Plan Home    Patient/Family in Agreement with Plan yes    Plan Comments Addiction Team consult, called Addiction Team RN to advise of consult.  Spoke with patient at bedside regarding discharge planning.  Patient denies use of HH or DME and reports that he does not know if he has prescription coverage, he does not take any medication.  He lives with his wife in a single level duplex with two steps to enter.  He received the COVID vaccine.  Patient inquiring aobut someone signing his FMLA paperwork.  CM advised that hospital staff do not usually sign legal paperework or FMLA because they will not be following him outside the hospital  but usually a PCP will sign.  Patient reports that he does not have a PCP any longer because he was to have a physical with BLAINE Whyte and did not go and has not seen him for several years.  He would like a PCP in [place] to be more accessible.  No discharge needs verbalized.  CM following.  Patient discharge plan is ongoing.    Final Discharge Disposition Code 01 - home or self-care    Row Name 03/09/23 2912       Plan    Plan Ongoing    Plan Comments Consult received, thank you.  BAL on admission= 240. Zenaida reviewed- currently a 9.  I have seen this patient in the past- in November of 2020- he refused AUD treatment at that time. Will follow and attempt to engagein AUD conversations when appropriate.              Continued Care and Services - Admitted Since 3/8/2023    Coordination has not been started for this encounter.       Expected Discharge Date and Time     Expected Discharge Date Expected Discharge Time    Mar 13, 2023          Demographic Summary     Row Name 03/09/23 1406       General Information    Admission Type inpatient    Arrived From home    Referral Source admission list    Reason for Consult discharge planning    Preferred Language English    General Information Comments None       Contact Information    Permission Granted to Share Info With     Contact Information Obtained for     Contact Information Comments Jesus Knight, Spouse  [phone]  Alphonso Knight, father  [phone]  Ernesto Chavez, step parent  [phone]               Functional Status     Row Name 03/09/23 1407       Functional Status    Usual Activity Tolerance good    Current Activity Tolerance good       Functional Status, IADL    Medications independent    Meal Preparation independent    Housekeeping independent    Laundry independent    Shopping independent       Employment/    Employment/ Comments [employer] Medicaid [place]               Psychosocial    No  documentation.                Abuse/Neglect    No documentation.                Legal    No documentation.                Substance Abuse    No documentation.                Patient Forms    No documentation.                   Ingrid Mace RN

## 2023-03-09 NOTE — CONSULTS
"Dung Knight  1985     “This provider is located at the [facility] located at [address], [address] using a secure Zoom Video Visit. Patient is being seen remotely via telehealth at [address], [address], and stated they are in a secure environment for this session. The patient's condition being diagnosed/treated is appropriate for telemedicine. The provider identified themselves as well as their credentials.   The patient, and/or patients guardian, consent to be seen remotely, and when consent is given they understand that the consent allows for patient identifiable information to be sent to a third party as needed.   They may refuse to be seen remotely at any time. The electronic data is encrypted and password protected, and the patient and/or guardian has been advised of the potential risks to privacy not withstanding such measures.”    Preferred Pronouns: he/him    Assessment Start and End: 225-245    Orientation: alert and oriented to person, place, and time     Is patient agreeable to admission/treatment? Yes    Guardian Name/Contact/etc: self    Pt Lives With:  Lives with significant other in [place], [place]    Highest Level of Education: high school diploma/GED     Presenting Problems: Patient presents to ED seeking medical detox from alcohol. Upon ED arrival, patient ETOH level was 240. Patient reports drinking 18 tall boys and 6 pack of Carlitos's Hard Lemonade every day. Patient states he has been drinking this much for 3 years. Patient states he doesn't go longer than 3 hours without a drink. Patient is observed dry heaving during assessment and he reports shaking, eye sensitivity, tactile disturbances, head pain, and anxiety. Patient reports history of DT's and seizure. Patient denies HI/SI.     Current Stressors: employment, finances    Depression: 8     Hopelessness: Moderate    Anxiety: 8    Sleep: Poor Patient states, \"I have to be highly medicated or " "drunk to sleep.\"    Appetite: Poor    Delusions: Patient presents with linear thought process.     Hallucinations: None    Homicidal Ideations: Absent     Current Mental Healthcare: Patient denies current engagement in outpatient mental health treatment.     Current Psychiatric Medications: N/A    Hx of Psychiatric Treatment: Patient denies history of psychiatric admissions. He reports hx of medical detox at the [facility] (2013) and [facility].        [place]-SUICIDE SEVERITY RATING SCALE  Psychiatric Inpatient Setting - Discharge Screener    Ask questions that are bold and underlined Discharge   Ask Questions 1 and 2 YES NO   1) Wish to be Dead:   Person endorses thoughts about a wish to be dead or not alive anymore, or wish to fall asleep and not wake up.  While you were here in the hospital, have you wished you were dead or wished you could go to sleep and not wake up?  X   2) Suicidal Thoughts:   General non-specific thoughts of wanting to end one's life/die by suicide, “I've thought about killing myself” without general thoughts of ways to kill oneself/associated methods, intent, or plan.   While you were here in the hospital, have you actually had thoughts about killing yourself?   X   If YES to 2, ask questions 3, 4, 5, and 6.  If NO to 2, go directly to question 6   3) Suicidal Thoughts with Method (without Specific Plan or Intent to Act):   Person endorses thoughts of suicide and has thought of a least one method during the assessment period. This is different than a specific plan with time, place or method details worked out. “I thought about taking an overdose but I never made a specific plan as to when where or how I would actually do it….and I would never go through with it.”   Have you been thinking about how you might kill yourself?      4) Suicidal Intent (without Specific Plan):   Active suicidal thoughts of killing oneself and patient reports having some intent to act on such thoughts, as opposed to “I have " the thoughts but I definitely will not do anything about them.”   Have you had these thoughts and had some intention of acting on them or do you have some intention of acting on them after you leave the hospital?      5) Suicide Intent with Specific Plan:   Thoughts of killing oneself with details of plan fully or partially worked out and person has some intent to carry it out.   Have you started to work out or worked out the details of how to kill yourself either for while you were here in the hospital or for after you leave the hospital? Do you intend to carry out this plan?        6) Suicide Behavior    While you were here in the hospital, have you done anything, started to do anything, or prepared to do anything to end your life?    Examples: Took pills, cut yourself, tried to hang yourself, took out pills but didn't swallow any because you changed your mind or someone took them from you, collected pills, secured a means of obtaining a gun, gave away valuables, wrote a will or suicide note, etc.  X     Suicidal: Absent    Previous Attempts: no prior suicide attempts    Most Recent Attempt: N/A    PSYCHOSOCIAL HISTORY:    Family Hx of Mental Health/Substance Abuse: Bipolar disorder, depression, PTSD, anxiety, and substance abuse    Patient Trauma/Abuse History: Patient does not disclose trauma history during assessment.     Does this require reporting: N/A    Legal History / History of Violence: The patient has no current pending legal charges. The patient has no history of significant violence.      History of Inappropriate Sexual Behavior: No      SUBSTANCE USE HISTORY:    Substance Use History:  Patient states he started drinking when he was 20 years old. Patient reports drinking 18 tall boys and 6 pack of Carlitos's Hard Lemonade every day. Patient states he has been drinking this much for 3 years. Patient states he doesn't go longer than 3 hours without a drink. Patient is observed dry heaving during assessment  and he reports shaking, eye sensitivity, tactile disturbances, head pain, and anxiety. Patient reports history of DT's and seizure.    WITHDRAWAL:      [facility] Withdrawal Assessment of Alcohol Scale    Date: 3/9/23 Time: 235    Pulse or heart rate, taken for one minute: 83 Blood pressure: 114/73    NAUSEA AND VOMITING--Ask “Do you fell sick to your stomach? Have you vomited?” Observatoin.  0 no nausea and no vomiting  1 mild nausea with no vomiting  2  3  4 intermittent nausea with dry heaves  5  6  7 constant nausea, frequent dry heaves and vomiting    TREMOR--Arms extended and fingers spread apart.  Observation  0 no tremor  1 not visible, but can be felt fingertip to fingertip  2  3  4 moderate, with patient's arms extended  5  6  7 severe, even with arms not extended    PAROXYSMAL SWEATS--Observation  0 no sweat visible  1 barely perceptible sweating, palms moist  2  3  4 beads of sweat obvious on forehead  5  6  7 drenching sweats    ANXIETY--Ask “Do you feel nervous?” Observation.  0 no anxiety, at ease  1 mild anxious  2  3  4 moderately anxious, or guarded, so anxiety is inferred  5  6  7 equivalent to acute panic states as seen in severe delirium or acute schizophrenic reactions    TACTILE DISTURBANCES--Ask “Have you any itching, pins and needles sensations, any burning, any numbness, or do you feel bugs crawling on or under your skin?” Observation.  0 none  1 very mild itching, pins and needles, burning or numbness  2 mild itching, pins and needles, burning or numbness  3 moderate itching, pins and needles, burning or numbness  4 moderately severe hallucinations  5 severe hallucinations  6 extremely severe hallucinations  7 continuous hallucinations  AUDITORY DISTURBANCES--Ask “Are you more aware of sounds around you ? Are they harsh? Do they frighten you?  Are you hearing anything that is disturbing to you?  Are you hearing things you know are not there? Observation.  0 not present  1 very mild  harshness or ability to frighten  2 mild harshness or ability to frighten  3 moderate harshness or ability to frighten  4 moderately severe hallucinations  5 severe hallucinations  6 extremely severe hallucinations  7 continuous hallucinations       VISUAL DISTURBANCES--Ask “Does the light appear to be too bright? Is its color different? Does it hurt your eyes?  Are you seeing anything that is disturbing to you? Are you seeing things you know are not there?' Observation  0 not present  1 very mild sensitivity  2 mild sensitivity  3 moderate sensitivity  4 moderately severe hallucinations  5 severe hallucinations  6 extremely severe hallucinations  7 continuous hallucinations    HEADACHE, FULLNESS IN HEAD--Ask “Does your head feel different? Does it feel like there is a band around your head?” Do not rate for dizziness or lightheadedness.  Otherwise, rate severity.  0 not present  1 very mild  2 mild  3 moderate  4 moderately severe  5 severe  6 very severe  7 extremely severe    AGITATION--Observation  0 normal activity  1 somewhat more than normal activity  2   3  4 moderately fidgety and restless  5  6  7 paces back and forth during most of the interview, or constantly thrashes about    ORIENTATION AND CLOUDING OF SENSORIUM--Ask   “What day is this? Where are you? Who am I?  0 oriented and can do serial additions  1 cannot do serial additions or is uncertain about date  2 disoriented for date by no more than 2 calendar days  3 disoriented for date by more than 2 calendar days  4 disoriented for place/or person        Total CIWA-Ar Score: 20   Rater's Initials:   Maximum Possible Score 67    History of DT's: Yes    History of Seizures: Yes    Current Medical Conditions or Biomedical Complications: AUD      DATA:   This therapist received a call from LEANNA Sparks for a behavioral health consult.  The patient is agreeable to speak with the behavioral health team.  Met with patient at bedside. Patient is not under 1:1  security monitoring during assessment.  Patient is a 37 year old, not , , male residing in [place], [place]. Patient currently lives with girlfriend.  Patient is employed.     Patient presents to ED seeking medical detox from alcohol. Upon ED arrival, patient ETOH level was 240. Patient reports drinking 18 tall boys and 6 pack of Carlitos's Hard Lemonade every day. Patient states he has been drinking this much for 3 years. Patient states he doesn't go longer than 3 hours without a drink. Patient is observed dry heaving during assessment and he reports shaking, eye sensitivity, tactile disturbances, head pain, and anxiety. Patient reports history of DT's and seizure. Patient states he has been planning on getting help for awhile. He states he is trying to prevent from dying. Patient denies HI/SI.     Safety plan of report to nearest hospital, or call police/911 if feeling unsafe, if having suicidal or homicidal thoughts, or if in emergent need of medications verbally reviewed with patient during assessment and suicide prevention/crisis hotlines verbally reviewed with patient during assessment.  Patient during assessment verbally agreed to safety plan. Patient reports to be agreeable for treatment recommendations.     ASSESSMENT:    Therapist completed CSSRS with patient for suicide risk assessment.  The results of patient’s CSSRS suggest that patient is denying death wish, SI, plan and intent.  Patient holds attention and is Cooperative with assessment.  Patient’s appearance is disheveled.  The patient displays Appropriate psychomotor behavior. The patient's affect appears normal. The patient is observed to have normal rate, tone and rhythm of speech.   Patient observed to have Fair eye contact. The patient's displays fair insight, with poor impulse control and fair judgement.     PLAN:    At this time, patient is requesting medical detox. I agree with this based on patient's current  presentation/symptoms/CIWA and history of DT's and seizure. Patient is requesting to detox at [facility]. Therapist notified Dr. Bruno.     Kacy Moore, CINDYC 3/9/23

## 2023-03-09 NOTE — PAYOR COMM NOTE
"Ref# [id number]  Hospital Admission    Utilization Review  Phone [phone]  Fax [phone]    [address] [address]    Dung King (37 y.o. Male)     Date of Birth   1985    Social Security Number       Address   [address] [place], [place] [address]    Home Phone   [phone]    MRN   [id number]       Jainism   None    Marital Status   Single                            Admission Date   3/8/23    Admission Type   Emergency    Admitting Provider   Jarett Turner III, DO    Attending Provider   Simon Gold MD    Department, Room/Bed   [facility] 4H, S472/1       Discharge Date       Discharge Disposition       Discharge Destination                               Attending Provider: Simon Gold MD    Allergies: Penicillins    Isolation: None   Infection: None   Code Status: CPR    Ht: 170.2 cm (67\")   Wt: 81.6 kg (180 lb)    Admission Cmt: None   Principal Problem: EtOH dependence (HCC) [F10.20]                 Active Insurance as of 3/8/2023     Primary Coverage     Payor Plan Insurance Group Employer/Plan Group    ANTHEM BLUE CROSS ANTHEM BLUE CROSS BLUE SHIELD PPO [id number]     Payor Plan Address Payor Plan Phone Number Payor Plan Fax Number Effective Dates    [address] [phone]  5/1/2021 - None Entered    [facility] [id number]       Subscriber Name Subscriber Birth Date Member ID       DUNG KING 1985 [id number]           Secondary Coverage     Payor Plan Insurance Group Employer/Plan Group    [place] [id number]     Payor Plan Address Payor Plan Phone Number Payor Plan Fax Number Effective Dates    [employer] [address] [address] [phone]  7/1/2020 - None Entered    [facility] [address]       Subscriber Name Subscriber Birth Date Member ID       DUNG KING 1985 [id number]                 Emergency Contacts      (Rel.) Home Phone Work Phone Mobile Phone    " "Noah Knight (Significant Other) -- -- [phone]    Alphonso Knight (Father) [phone] -- --    ScottErnesto (Step Parent) -- -- [phone]            [facility] [id number] Tax ID [id number]  Insurance Information                ANTHEM BLUE CROSS/ANTHEM BLUE CROSS BLUE SHIELD PPO Phone: [phone]    Subscriber: Dung Knight Subscriber#: [id number]    Group#: [id number] Precert#: [id number]        [place] Phone: [phone]    Subscriber: Dung Knight Subscriber#: [id number]    Group#: [id number] Precert#: --             History & Physical      Jarett Turner III, DO at 23 0310              [facility] Medicine Services  HISTORY AND PHYSICAL    Patient Name: Dung Knight  : 1985  MRN: [id number]  Primary Care Physician: Han Reyes, BLAINE  Date of admission: 3/8/2023    Subjective    Subjective     Chief Complaint:  ETOH    HPI:  Dung Knight is a 37 y.o. male with PMH of alcoholic hepatitis, HTN and tobacco abuse who presents to the ED for alcohol detox. He wanted to go to an inpatient facility, however they wouldn't take him until he was done withdrawaling. Patient states he drinks 18 tall boys throughout the day. He drinks another 6 malt liquors throughout the night to get through work without \"the shakes.\" He states he starts to withdrawal 2-3 hours after his last drink. His last drink was in the parking lot before he came into the ED. Patient has a history of withdrawal seizures.         Review of Systems   Constitutional: Positive for appetite change. Negative for activity change, fatigue and fever.   HENT: Negative for congestion, sore throat and trouble swallowing.    Eyes: Negative.    Respiratory: Negative for chest tightness and shortness of breath.    Cardiovascular: Negative for chest pain and leg swelling.   Gastrointestinal: Positive for abdominal pain, nausea and vomiting. Negative for abdominal distention and " diarrhea.   Endocrine: Negative.    Genitourinary: Negative for difficulty urinating and dysuria.   Musculoskeletal: Negative.    Skin: Negative.    Neurological: Positive for tremors. Negative for dizziness and headaches.   Hematological: Negative.    Psychiatric/Behavioral: Negative for agitation and confusion.          Personal History     Past Medical History:   Diagnosis Date   • Asthma    • Hypertension    • Scoliosis    • Seizures (HCC)              Past Surgical History:   Procedure Laterality Date   • COLONOSCOPY     • HERNIA REPAIR     • TESTICLE SURGERY         Family History:  family history includes Arthritis in his father and mother.     Social History:  reports that he has been smoking cigarettes. He started smoking about 19 years ago. He has been smoking an average of 3.5 packs per day. He has never used smokeless tobacco. He reports current alcohol use of about 21.0 standard drinks per week. He reports that he does not currently use drugs after having used the following drugs: Marijuana.  Social History     Social History Narrative   • Not on file       Medications:  HYDROcodone-acetaminophen, QUEtiapine, acetaminophen, albuterol sulfate HFA, bacitracin, bacitracin-polymyxin b, carvedilol, erythromycin, folic acid, ibuprofen, multivitamin with minerals, omeprazole, ondansetron, thiamine, and traZODone    Allergies   Allergen Reactions   • Penicillins Unknown (See Comments)     Unknown reaction       Objective    Objective     Vital Signs:   Temp:  [98.6 °F (37 °C)] 98.6 °F (37 °C)  Heart Rate:  [] 83  Resp:  [16] 16  BP: (100-150)/() 114/73    Physical Exam  Constitutional:       General: He is not in acute distress.     Appearance: He is normal weight. He is ill-appearing.   HENT:      Head: Normocephalic.      Nose: Nose normal. No congestion.      Mouth/Throat:      Mouth: Mucous membranes are moist.   Eyes:      Extraocular Movements: Extraocular movements intact.      Pupils: Pupils  are equal, round, and reactive to light.   Cardiovascular:      Rate and Rhythm: Normal rate and regular rhythm.      Pulses: Normal pulses.      Heart sounds: Normal heart sounds. No murmur heard.  Pulmonary:      Effort: Pulmonary effort is normal. No respiratory distress.      Breath sounds: Normal breath sounds. No wheezing or rhonchi.   Abdominal:      General: Bowel sounds are normal.      Palpations: Abdomen is soft.      Tenderness: There is abdominal tenderness.   Musculoskeletal:         General: No swelling. Normal range of motion.      Cervical back: Normal range of motion. No rigidity.   Skin:     General: Skin is warm.      Capillary Refill: Capillary refill takes less than 2 seconds.   Neurological:      General: No focal deficit present.      Mental Status: He is alert and oriented to person, place, and time. Mental status is at baseline.      Motor: No weakness.   Psychiatric:         Mood and Affect: Mood normal.         Behavior: Behavior normal.         Thought Content: Thought content normal.         Judgment: Judgment normal.          Result Review:  I have personally reviewed the results from the time of this admission to 3/9/2023 03:10 EST and agree with these findings:  [x]  Laboratory list / accordion  [x]  Microbiology  []  Radiology  [x]  EKG/Telemetry   []  Cardiology/Vascular   []  Pathology  [x]  Old records  []  Other:  Most notable findings include:     LAB RESULTS:      Lab 03/08/23  1823   WBC 10.35   HEMOGLOBIN 18.4*   HEMATOCRIT 52.6*   PLATELETS 181   NEUTROS ABS 7.07*   IMMATURE GRANS (ABS) 0.05   LYMPHS ABS 2.22   MONOS ABS 0.73   EOS ABS 0.16   MCV 94.4         Lab 03/08/23  1823   SODIUM 132*   POTASSIUM 4.4   CHLORIDE 94*   CO2 24.0   ANION GAP 14.0   BUN 6   CREATININE 0.73*   EGFR 120.9   GLUCOSE 120*   CALCIUM 8.5*   MAGNESIUM 2.4         Lab 03/08/23  1823   TOTAL PROTEIN 7.3   ALBUMIN 4.6   GLOBULIN 2.7   ALT (SGPT) 138*   AST (SGOT) 107*   BILIRUBIN 0.3   ALK PHOS 84  "                    Brief Urine Lab Results     None        Microbiology Results (last 10 days)     ** No results found for the last 240 hours. **          No radiology results from the last 24 hrs        Assessment & Plan   Assessment & Plan       EtOH dependence (HCC)    Alcohol withdrawal seizure (HCC)    Alcoholic hepatitis    Essential hypertension    Tobacco use disorder    Dung Knight is a 37 y.o. male with PMH of alcoholic hepatitis, HTN and tobacco abuse who presents to the ED for alcohol detox.    ETOH Dependence  Hx of Withdrawal Seizures  Alcoholic Hepatitis   -CIWA protocol  -Seizure precautions  -Continue home seroquel  -Maintenance fluids  -Folic acid and thiamine  -Low threshold for ICU    HTN  -Hold home coreg for now    Tobacco Abuse  -Nicotine patch  -Encourage cessation    DVT prophylaxis:  SCDs    CODE STATUS:  FULL       Expected Discharge  TBD        This note has been completed as part of a split-shared workflow.     Signature: Electronically signed by BLAINE Jade, 03/09/23, 3:27 AM EST.    Total APC Time: 30 minutes      Attending   Admission Attestation       I have performed an independent face-to-face diagnostic evaluation including performing an independent physical examination as documented here.  The documented plan of care above was reviewed and developed with the advanced practice clinician (APC).      Brief Summary Statement:   Dung Knight is a 37 y.o. male who states that he drinks \"18 tall boy beers\" and 5-6 Moler drinks every day.  He states he drinks the malt liquor during the night to get through his job \"without getting shaky.\"  He states he is at the point where after his last drink during a binge, he begins to have withdrawal symptoms just 2 hours afterwards.  He states he wishes to go to an inpatient rehab facility.  His last alcohol intake was tonight, in the parking lot before he came into the ED to be evaluated.  He does have history of withdrawal seizures but no " seizure activity is noted today.  ED physician states the patient was going to be sent to an inpatient facility tonight but they deemed him as too far into withdrawal to be moved tonight, so he will be admitted to the internal medicine/hospitalist service.  He denies chest pain, shortness of breath, fever/chills, slurred speech/facial droop, hallucinations, or syncope.  He states he did have some emesis earlier while in the ED but he received IV Ativan which has relieved some anxiety and his nausea.    Remainder of detailed HPI is as noted by APC and has been reviewed and/or edited by me for completeness.    Attending Physical Exam:  Temp:  [98.6 °F (37 °C)] 98.6 °F (37 °C)  Heart Rate:  [] 83  Resp:  [16] 16  BP: (100-150)/() 114/73    Constitutional: Awake, alert, NAD.  Eyes: PERRLA, sclerae anicteric, no conjunctival injection  HENT: NCAT, mucous membranes dry.  Neck: Supple, no thyromegaly, no lymphadenopathy, trachea midline  Respiratory: Clear to auscultation bilaterally, nonlabored respirations   Cardiovascular: RRR, no murmurs, rubs, or gallops, palpable pedal pulses bilaterally  Gastrointestinal: Positive bowel sounds, soft, mild diffuse TTP, nondistended  Musculoskeletal: No bilateral ankle edema, no clubbing or cyanosis to extremities  Psychiatric: Appropriate affect, cooperative  Neurologic: Oriented x 3, strength symmetric in all extremities, Cranial Nerves grossly intact to confrontation, speech clear  Skin: No rashes, normal turgor.    Brief Assessment/Plan :  See detailed assessment and plan developed with APC which I have reviewed and/or edited for completeness.    Total time spent: 25 minutes  Time spent includes time reviewing chart, face-to-face time, counseling patient/family/caregiver, ordering medications/tests/procedures, communicating with other health care professionals, documenting clinical information in the electronic health record, and coordination of care.        Jarett  Lam Turner III, DO  03/09/23                        Electronically signed by Jarett Turner III, DO at 03/09/23 0448       Emergency Department Notes    No notes of this type exist for this encounter.         CIWA (last day)     Date/Time CIWA-Ar Score    03/09/23 0800 6    03/09/23 0505 7    03/09/23 04:31:11 5    03/09/23 01:57:59 14    03/08/23 21:19:39 4    03/08/23 18:54:37 3        Physician Progress Notes (last 24 hours)  Notes from 03/08/23 1243 through 03/09/23 1243   No notes of this type exist for this encounter.            Consult Notes (last 24 hours)      Kacy Moore, [facility] at 03/09/23 0218      Consult Orders    1. Psych / Access to See [[id number]] ordered by Francisco Lynch MD at 03/08/23 1803               Dung Knight  1985     “This provider is located at the [facility] located at [address] [place] [address] using a secure Zoom Video Visit. Patient is being seen remotely via telehealth at [address] [place] [address], [id number], and stated they are in a secure environment for this session. The patient's condition being diagnosed/treated is appropriate for telemedicine. The provider identified themselves as well as their credentials.   The patient, and/or patients guardian, consent to be seen remotely, and when consent is given they understand that the consent allows for patient identifiable information to be sent to a third party as needed.   They may refuse to be seen remotely at any time. The electronic data is encrypted and password protected, and the patient and/or guardian has been advised of the potential risks to privacy not withstanding such measures.”    Preferred Pronouns: he/him    Assessment Start and End: 225-245    Orientation: alert and oriented to person, place, and time     Is patient agreeable to admission/treatment? Yes    Guardian Name/Contact/etc: self    Pt Lives With:  Lives with significant other in [facility]  [place]    Highest Level of Education: high school diploma/GED     Presenting Problems: Patient presents to ED seeking medical detox from alcohol. Upon ED arrival, patient ETOH level was 240. Patient reports drinking 18 tall boys and 6 pack of Carlitos's Hard Lemonade every day. Patient states he has been drinking this much for 3 years. Patient states he doesn't go longer than 3 hours without a drink. Patient is observed dry heaving during assessment and he reports shaking, eye sensitivity, tactile disturbances, head pain, and anxiety. Patient reports history of DT's and seizure. Patient denies HI/SI.     Current Stressors: employment, finances    Depression: 8     Hopelessness: Moderate    Anxiety: 8    Sleep: Poor Patient states, \"I have to be highly medicated or drunk to sleep.\"    Appetite: Poor    Delusions: Patient presents with linear thought process.     Hallucinations: None    Homicidal Ideations: Absent     Current Mental Healthcare: Patient denies current engagement in outpatient mental health treatment.     Current Psychiatric Medications: N/A    Hx of Psychiatric Treatment: Patient denies history of psychiatric admissions. He reports hx of medical detox at the [facility] (2013) and [facility].        [place]-SUICIDE SEVERITY RATING SCALE  Psychiatric Inpatient Setting - Discharge Screener    Ask questions that are bold and underlined Discharge   Ask Questions 1 and 2 YES NO   1) Wish to be Dead:   Person endorses thoughts about a wish to be dead or not alive anymore, or wish to fall asleep and not wake up.  While you were here in the hospital, have you wished you were dead or wished you could go to sleep and not wake up?  X   2) Suicidal Thoughts:   General non-specific thoughts of wanting to end one's life/die by suicide, “I've thought about killing myself” without general thoughts of ways to kill oneself/associated methods, intent, or plan.   While you were here in the hospital, have you actually had thoughts about " killing yourself?   X   If YES to 2, ask questions 3, 4, 5, and 6.  If NO to 2, go directly to question 6   3) Suicidal Thoughts with Method (without Specific Plan or Intent to Act):   Person endorses thoughts of suicide and has thought of a least one method during the assessment period. This is different than a specific plan with time, place or method details worked out. “I thought about taking an overdose but I never made a specific plan as to when where or how I would actually do it….and I would never go through with it.”   Have you been thinking about how you might kill yourself?      4) Suicidal Intent (without Specific Plan):   Active suicidal thoughts of killing oneself and patient reports having some intent to act on such thoughts, as opposed to “I have the thoughts but I definitely will not do anything about them.”   Have you had these thoughts and had some intention of acting on them or do you have some intention of acting on them after you leave the hospital?      5) Suicide Intent with Specific Plan:   Thoughts of killing oneself with details of plan fully or partially worked out and person has some intent to carry it out.   Have you started to work out or worked out the details of how to kill yourself either for while you were here in the hospital or for after you leave the hospital? Do you intend to carry out this plan?        6) Suicide Behavior    While you were here in the hospital, have you done anything, started to do anything, or prepared to do anything to end your life?    Examples: Took pills, cut yourself, tried to hang yourself, took out pills but didn't swallow any because you changed your mind or someone took them from you, collected pills, secured a means of obtaining a gun, gave away valuables, wrote a will or suicide note, etc.  X     Suicidal: Absent    Previous Attempts: no prior suicide attempts    Most Recent Attempt: N/A    PSYCHOSOCIAL HISTORY:    Family Hx of Mental  Health/Substance Abuse: Bipolar disorder, depression, PTSD, anxiety, and substance abuse    Patient Trauma/Abuse History: Patient does not disclose trauma history during assessment.     Does this require reporting: N/A    Legal History / History of Violence: The patient has no current pending legal charges. The patient has no history of significant violence.      History of Inappropriate Sexual Behavior: No      SUBSTANCE USE HISTORY:    Substance Use History:  Patient states he started drinking when he was 20 years old. Patient reports drinking 18 tall boys and 6 pack of Carlitos's Hard Lemonade every day. Patient states he has been drinking this much for 3 years. Patient states he doesn't go longer than 3 hours without a drink. Patient is observed dry heaving during assessment and he reports shaking, eye sensitivity, tactile disturbances, head pain, and anxiety. Patient reports history of DT's and seizure.    WITHDRAWAL:      [facility] Withdrawal Assessment of Alcohol Scale    Date: 3/9/23 Time: 235    Pulse or heart rate, taken for one minute: 83 Blood pressure: 114/73    NAUSEA AND VOMITING--Ask “Do you fell sick to your stomach? Have you vomited?” Observatoin.  0 no nausea and no vomiting  1 mild nausea with no vomiting  2  3  4 intermittent nausea with dry heaves  5  6  7 constant nausea, frequent dry heaves and vomiting    TREMOR--Arms extended and fingers spread apart.  Observation  0 no tremor  1 not visible, but can be felt fingertip to fingertip  2  3  4 moderate, with patient's arms extended  5  6  7 severe, even with arms not extended    PAROXYSMAL SWEATS--Observation  0 no sweat visible  1 barely perceptible sweating, palms moist  2  3  4 beads of sweat obvious on forehead  5  6  7 drenching sweats    ANXIETY--Ask “Do you feel nervous?” Observation.  0 no anxiety, at ease  1 mild anxious  2  3  4 moderately anxious, or guarded, so anxiety is inferred  5  6  7 equivalent to acute panic states as  seen in severe delirium or acute schizophrenic reactions    TACTILE DISTURBANCES--Ask “Have you any itching, pins and needles sensations, any burning, any numbness, or do you feel bugs crawling on or under your skin?” Observation.  0 none  1 very mild itching, pins and needles, burning or numbness  2 mild itching, pins and needles, burning or numbness  3 moderate itching, pins and needles, burning or numbness  4 moderately severe hallucinations  5 severe hallucinations  6 extremely severe hallucinations  7 continuous hallucinations  AUDITORY DISTURBANCES--Ask “Are you more aware of sounds around you ? Are they harsh? Do they frighten you?  Are you hearing anything that is disturbing to you?  Are you hearing things you know are not there? Observation.  0 not present  1 very mild harshness or ability to frighten  2 mild harshness or ability to frighten  3 moderate harshness or ability to frighten  4 moderately severe hallucinations  5 severe hallucinations  6 extremely severe hallucinations  7 continuous hallucinations       VISUAL DISTURBANCES--Ask “Does the light appear to be too bright? Is its color different? Does it hurt your eyes?  Are you seeing anything that is disturbing to you? Are you seeing things you know are not there?' Observation  0 not present  1 very mild sensitivity  2 mild sensitivity  3 moderate sensitivity  4 moderately severe hallucinations  5 severe hallucinations  6 extremely severe hallucinations  7 continuous hallucinations    HEADACHE, FULLNESS IN HEAD--Ask “Does your head feel different? Does it feel like there is a band around your head?” Do not rate for dizziness or lightheadedness.  Otherwise, rate severity.  0 not present  1 very mild  2 mild  3 moderate  4 moderately severe  5 severe  6 very severe  7 extremely severe    AGITATION--Observation  0 normal activity  1 somewhat more than normal activity  2   3  4 moderately fidgety and restless  5  6  7 paces back and forth during most of  the interview, or constantly thrashes about    ORIENTATION AND CLOUDING OF SENSORIUM--Ask   “What day is this? Where are you? Who am I?  0 oriented and can do serial additions  1 cannot do serial additions or is uncertain about date  2 disoriented for date by no more than 2 calendar days  3 disoriented for date by more than 2 calendar days  4 disoriented for place/or person        Total CIWA-Ar Score: 20   Rater's Initials:   Maximum Possible Score 67    History of DT's: Yes    History of Seizures: Yes    Current Medical Conditions or Biomedical Complications: AUD      DATA:   This therapist received a call from LEANNA Sparks for a behavioral health consult.  The patient is agreeable to speak with the behavioral health team.  Met with patient at bedside. Patient is not under 1:1 security monitoring during assessment.  Patient is a 37 year old, not , , male residing in [place], [place]. Patient currently lives with girlfriend.  Patient is employed.     Patient presents to ED seeking medical detox from alcohol. Upon ED arrival, patient ETOH level was 240. Patient reports drinking 18 tall boys and 6 pack of Carlitos's Hard Lemonade every day. Patient states he has been drinking this much for 3 years. Patient states he doesn't go longer than 3 hours without a drink. Patient is observed dry heaving during assessment and he reports shaking, eye sensitivity, tactile disturbances, head pain, and anxiety. Patient reports history of DT's and seizure. Patient states he has been planning on getting help for awhile. He states he is trying to prevent from dying. Patient denies HI/SI.     Safety plan of report to nearest hospital, or call police/911 if feeling unsafe, if having suicidal or homicidal thoughts, or if in emergent need of medications verbally reviewed with patient during assessment and suicide prevention/crisis hotlines verbally reviewed with patient during assessment.  Patient during assessment verbally  agreed to safety plan. Patient reports to be agreeable for treatment recommendations.     ASSESSMENT:    Therapist completed CSSRS with patient for suicide risk assessment.  The results of patient’s CSSRS suggest that patient is denying death wish, SI, plan and intent.  Patient holds attention and is Cooperative with assessment.  Patient’s appearance is disheveled.  The patient displays Appropriate psychomotor behavior. The patient's affect appears normal. The patient is observed to have normal rate, tone and rhythm of speech.   Patient observed to have Fair eye contact. The patient's displays fair insight, with poor impulse control and fair judgement.     PLAN:    At this time, patient is requesting medical detox. I agree with this based on patient's current presentation/symptoms/CIWA and history of DT's and seizure. Patient is requesting to detox at [facility]. Therapist notified Dr. Bruno.     SHAGGY Berry 3/9/23                Electronically signed by Kacy Moore LPCC at 03/09/23 0311

## 2023-03-10 PROCEDURE — 99232 SBSQ HOSP IP/OBS MODERATE 35: CPT | Performed by: STUDENT IN AN ORGANIZED HEALTH CARE EDUCATION/TRAINING PROGRAM

## 2023-03-10 RX ADMIN — DIAZEPAM 5 MG: 5 TABLET ORAL at 05:16

## 2023-03-10 RX ADMIN — CARVEDILOL 6.25 MG: 6.25 TABLET, FILM COATED ORAL at 17:27

## 2023-03-10 RX ADMIN — PANTOPRAZOLE SODIUM 40 MG: 40 TABLET, DELAYED RELEASE ORAL at 05:16

## 2023-03-10 RX ADMIN — Medication 10 ML: at 08:45

## 2023-03-10 RX ADMIN — LORAZEPAM 2 MG: 1 TABLET ORAL at 08:49

## 2023-03-10 RX ADMIN — THIAMINE HCL TAB 100 MG 100 MG: 100 TAB at 08:44

## 2023-03-10 RX ADMIN — TRAZODONE HYDROCHLORIDE 50 MG: 50 TABLET ORAL at 22:05

## 2023-03-10 RX ADMIN — LORAZEPAM 1 MG: 1 TABLET ORAL at 19:55

## 2023-03-10 RX ADMIN — MULTIVITAMIN TABLET 1 TABLET: TABLET at 08:44

## 2023-03-10 RX ADMIN — FOLIC ACID 1 MG: 1 TABLET ORAL at 08:44

## 2023-03-10 RX ADMIN — CARVEDILOL 6.25 MG: 6.25 TABLET, FILM COATED ORAL at 08:44

## 2023-03-10 RX ADMIN — DIAZEPAM 5 MG: 5 TABLET ORAL at 22:05

## 2023-03-10 RX ADMIN — DIAZEPAM 5 MG: 5 TABLET ORAL at 16:31

## 2023-03-10 RX ADMIN — Medication 10 ML: at 22:05

## 2023-03-10 RX ADMIN — DIAZEPAM 5 MG: 5 TABLET ORAL at 10:31

## 2023-03-10 RX ADMIN — Medication 1 PATCH: at 08:44

## 2023-03-10 RX ADMIN — QUETIAPINE FUMARATE 50 MG: 25 TABLET ORAL at 22:05

## 2023-03-10 RX ADMIN — SODIUM CHLORIDE 100 ML/HR: 9 INJECTION, SOLUTION INTRAVENOUS at 00:35

## 2023-03-10 NOTE — PLAN OF CARE
Goal Outcome Evaluation:         A&Ox4. CIWA scores ranging from 2-9 throughout shift. VSS. NSR. EtCO2 levels around 28-32. Family at bedside. Tolerating diet. No NVD. prn ativan x1. Cont NS running @ 100.

## 2023-03-10 NOTE — PROGRESS NOTES
[facility] Medicine Services  PROGRESS NOTE    Patient Name: Dung Knight  : 1985  MRN: [id number]    Date of Admission: 3/8/2023  Primary Care Physician: Han Reyes APRN    Subjective   Subjective     CC:  Follow-up alcohol detox    HPI:  No acute overnight issues.  He states his tremors are getting better    ROS:  Gen- No fevers, chills  CV- No chest pain, palpitations  Resp- No cough, dyspnea  GI- No N/V/D, abd pain         Objective   Objective     Vital Signs:   Temp:  [97.7 °F (36.5 °C)-97.9 °F (36.6 °C)] 97.9 °F (36.6 °C)  Heart Rate:  [63-94] 84  Resp:  [18] 18  BP: (120-135)/(80-98) 120/80     Physical Exam:  Constitutional: No acute distress, awake, alert  HENT: NCAT, mucous membranes moist  Respiratory: Clear to auscultation bilaterally, respiratory effort normal   Cardiovascular: RRR, no murmurs, rubs, or gallops  Gastrointestinal: Positive bowel sounds, soft, nontender, nondistended  Musculoskeletal: No bilateral ankle edema  Psychiatric: Appropriate affect, cooperative  Neurologic: Oriented x 3, strength symmetric in all extremities, Cranial Nerves grossly intact to confrontation, speech clear  Skin: No rashes      Results Reviewed:  LAB RESULTS:      Lab 23  1423 23  1823   WBC 7.23 10.35   HEMOGLOBIN 15.5 18.4*   HEMATOCRIT 45.0 52.6*   PLATELETS 145 181   NEUTROS ABS 4.48 7.07*   IMMATURE GRANS (ABS)  --  0.05   LYMPHS ABS  --  2.22   MONOS ABS  --  0.73   EOS ABS 0.07 0.16   MCV 93.8 94.4         Lab 23  1423 23  1823   SODIUM 132* 132*   POTASSIUM 4.2 4.4   CHLORIDE 99 94*   CO2 23.0 24.0   ANION GAP 10.0 14.0   BUN 8 6   CREATININE 0.52* 0.73*   EGFR 134.0 120.9   GLUCOSE 144* 120*   CALCIUM 8.3* 8.5*   MAGNESIUM 2.1 2.4         Lab 23  1823   TOTAL PROTEIN 7.3   ALBUMIN 4.6   GLOBULIN 2.7   ALT (SGPT) 138*   AST (SGOT) 107*   BILIRUBIN 0.3   ALK PHOS 84                     Brief Urine Lab Results     None           Microbiology Results Abnormal     None          No radiology results from the last 24 hrs        Current medications:  Scheduled Meds:carvedilol, 6.25 mg, Oral, BID With Meals  diazePAM, 5 mg, Oral, Q6H  thiamine, 100 mg, Oral, Daily   And  multivitamin, 1 tablet, Oral, Daily   And  folic acid, 1 mg, Oral, Daily  nicotine, 1 patch, Transdermal, Q24H  pantoprazole, 40 mg, Oral, Q AM  QUEtiapine, 50 mg, Oral, Nightly  sodium chloride, 10 mL, Intravenous, Q12H  traZODone, 50 mg, Oral, Nightly      Continuous Infusions:   PRN Meds:.•  LORazepam **OR** LORazepam **OR** LORazepam **OR** LORazepam **OR** LORazepam **OR** LORazepam  •  Morphine  •  nicotine polacrilex  •  ondansetron **OR** ondansetron  •  sodium chloride  •  sodium chloride    Assessment & Plan   Assessment & Plan     Active Hospital Problems    Diagnosis  POA   • **EtOH dependence (HCC) [F10.20]  Unknown   • History of alcoholism (HCC) [F10.21]  Not Applicable   • Tobacco use disorder [F17.200]  Yes   • Essential hypertension [I10]  Yes   • Alcohol withdrawal seizure (HCC) [F10.939, R56.9]  Yes   • Alcoholic hepatitis [K70.10]  Yes      Resolved Hospital Problems   No resolved problems to display.        Brief Hospital Course to date:  Dung Knight is a 37 y.o. male with PMH of alcoholic hepatitis, HTN and tobacco abuse who presents to the ED for alcohol detox.  He has a history of alcohol withdrawal seizures and has been in the ICU in the past for alcohol withdrawal     ETOH Dependence  Hx of Withdrawal Seizures  Alcoholic Hepatitis   -CIWA protocol as well as scheduled Valium  -Seizure precautions  -Continue home seroquel  -Status post maintenance fluids; check CMP in AM  -Folic acid and thiamine  - Addiction team has seen and given resources for help support group     HTN  -Continue home medications     Tobacco Abuse   -Nicotine patch  -Encourage cessation       Expected Discharge Location and Transportation: home  Expected Discharge   Expected  Discharge Date and Time     Expected Discharge Date Expected Discharge Time    Mar 12, 2023            DVT prophylaxis:  Mechanical DVT prophylaxis orders are present.     AM-PAC 6 Clicks Score (PT): 24 (03/10/23 0837)    CODE STATUS:   Code Status and Medical Interventions:   Ordered at: 03/09/23 0331     Level Of Support Discussed With:    Patient     Code Status (Patient has no pulse and is not breathing):    CPR (Attempt to Resuscitate)     Medical Interventions (Patient has pulse or is breathing):    Full Support       Yanelis Rouse MD  03/10/23

## 2023-03-10 NOTE — CASE MANAGEMENT/SOCIAL WORK
Continued Stay Note  [facility]     Patient Name: Dung Knight  MRN: [id number]  Today's Date: 3/10/2023    Admit Date: 3/8/2023    Plan: update   Discharge Plan     Row Name 03/10/23 1149       Plan    Plan update    Patient/Family in Agreement with Plan yes    Plan Comments Spoke with patient at bedside regarding discharge plan.  Patient reports he is not feeling well today.   No discharge needs verbalized.  Per MD[facility]  Per previous discussion with patient, called [facility] who has made arrangements for patient to establish care with Dr. Kwan in [facility] on 3/16 at 0845.  CM following.  Patient plan is to discharge home via car with family to transport.    Final Discharge Disposition Code 01 - home or self-care               Discharge Codes    No documentation.               Expected Discharge Date and Time     Expected Discharge Date Expected Discharge Time    Mar 13, 2023             Ingrid Mace RN

## 2023-03-10 NOTE — CASE MANAGEMENT/SOCIAL WORK
Continued Stay Note  [facility]     Patient Name: Dung Knight  MRN: [id number]  Today's Date: 3/10/2023    Admit Date: 3/8/2023    Plan: AUD treatmenbt eesouerces provided   Discharge Plan     Row Name 03/10/23 1320       Plan    Plan AUD treatment resources provided    Plan Comments  A comprehensive treatment resource packet was provided which includes: Inpatient, IOP, Clinics, [facility] (recovery community with coaches and a multitude of self-help support groups offered), and [facility].  The packet also contains our outreach numbers for assistance post discharge from the hospital.    [facility] reviewed= 11 at 0845 this morning.      Row Name 03/10/23 1149       Plan    Plan update    Patient/Family in Agreement with Plan yes    Plan Comments Spoke with patient at bedside regarding discharge plan.  Patient reports he is not feeling well today.   No discharge needs verbalized.  Per MD, likely d/c over [facility].  Per previous discussion with patient, called [facility] who has made arrangements for patient to establish care with Dr. Kwan in [facility] on 3/16 at 0845.  CM following.  Patient plan is to discharge home via car with family to transport.    Final Discharge Disposition Code 01 - home or self-care               Discharge Codes    No documentation.               Expected Discharge Date and Time     Expected Discharge Date Expected Discharge Time    Mar 13, 2023             Sara Daniel RN  MA,BSN-  Addiction Coordinator

## 2023-03-11 LAB
ALBUMIN SERPL-MCNC: 3.1 G/DL (ref 3.5–5.2)
ALBUMIN/GLOB SERPL: 1.4 G/DL
ALP SERPL-CCNC: 67 U/L (ref 39–117)
ALT SERPL W P-5'-P-CCNC: 131 U/L (ref 1–41)
ANION GAP SERPL CALCULATED.3IONS-SCNC: 7 MMOL/L (ref 5–15)
AST SERPL-CCNC: 93 U/L (ref 1–40)
BILIRUB SERPL-MCNC: 0.5 MG/DL (ref 0–1.2)
BUN SERPL-MCNC: 3 MG/DL (ref 6–20)
BUN/CREAT SERPL: 5.1 (ref 7–25)
CALCIUM SPEC-SCNC: 8.3 MG/DL (ref 8.6–10.5)
CHLORIDE SERPL-SCNC: 107 MMOL/L (ref 98–107)
CO2 SERPL-SCNC: 22 MMOL/L (ref 22–29)
CREAT SERPL-MCNC: 0.59 MG/DL (ref 0.76–1.27)
EGFRCR SERPLBLD CKD-EPI 2021: 129 ML/MIN/1.73
GLOBULIN UR ELPH-MCNC: 2.2 GM/DL
GLUCOSE SERPL-MCNC: 92 MG/DL (ref 65–99)
POTASSIUM SERPL-SCNC: 4.1 MMOL/L (ref 3.5–5.2)
PROT SERPL-MCNC: 5.3 G/DL (ref 6–8.5)
SODIUM SERPL-SCNC: 136 MMOL/L (ref 136–145)

## 2023-03-11 PROCEDURE — 80053 COMPREHEN METABOLIC PANEL: CPT | Performed by: STUDENT IN AN ORGANIZED HEALTH CARE EDUCATION/TRAINING PROGRAM

## 2023-03-11 PROCEDURE — 99232 SBSQ HOSP IP/OBS MODERATE 35: CPT | Performed by: STUDENT IN AN ORGANIZED HEALTH CARE EDUCATION/TRAINING PROGRAM

## 2023-03-11 RX ADMIN — CARVEDILOL 6.25 MG: 6.25 TABLET, FILM COATED ORAL at 17:25

## 2023-03-11 RX ADMIN — DIAZEPAM 5 MG: 5 TABLET ORAL at 06:00

## 2023-03-11 RX ADMIN — THIAMINE HCL TAB 100 MG 100 MG: 100 TAB at 08:43

## 2023-03-11 RX ADMIN — PANTOPRAZOLE SODIUM 40 MG: 40 TABLET, DELAYED RELEASE ORAL at 06:00

## 2023-03-11 RX ADMIN — MULTIVITAMIN TABLET 1 TABLET: TABLET at 08:43

## 2023-03-11 RX ADMIN — DIAZEPAM 5 MG: 5 TABLET ORAL at 11:18

## 2023-03-11 RX ADMIN — QUETIAPINE FUMARATE 50 MG: 25 TABLET ORAL at 21:04

## 2023-03-11 RX ADMIN — CARVEDILOL 6.25 MG: 6.25 TABLET, FILM COATED ORAL at 08:42

## 2023-03-11 RX ADMIN — TRAZODONE HYDROCHLORIDE 50 MG: 50 TABLET ORAL at 21:04

## 2023-03-11 RX ADMIN — Medication 10 ML: at 08:47

## 2023-03-11 RX ADMIN — FOLIC ACID 1 MG: 1 TABLET ORAL at 08:43

## 2023-03-11 RX ADMIN — Medication 1 PATCH: at 08:47

## 2023-03-11 NOTE — PROGRESS NOTES
[facility] Medicine Services  PROGRESS NOTE    Patient Name: Dung Knight  : 1985  MRN: [id number]    Date of Admission: 3/8/2023  Primary Care Physician: Han Reyes APRN    Subjective   Subjective     CC:  Follow-up alcohol detox    HPI:  No new issues. No new tremors    ROS:  Gen- No fevers, chills  CV- No chest pain, palpitations  Resp- No cough, dyspnea  GI- No N/V/D, abd pain         Objective   Objective     Vital Signs:   Temp:  [97.7 °F (36.5 °C)-98.2 °F (36.8 °C)] 98.1 °F (36.7 °C)  Heart Rate:  [] 75  Resp:  [16-18] 18  BP: (105-149)/() 149/101     Physical Exam:  Constitutional: No acute distress, awake, alert  HENT: NCAT, mucous membranes moist  Respiratory: Clear to auscultation bilaterally, respiratory effort normal   Cardiovascular: RRR, no murmurs, rubs, or gallops  Gastrointestinal: Positive bowel sounds, soft, nontender, nondistended  Musculoskeletal: No bilateral ankle edema  Psychiatric: Appropriate affect, cooperative  Neurologic: Oriented x 3, strength symmetric in all extremities, Cranial Nerves grossly intact to confrontation, speech clear  Skin: No rashes      Results Reviewed:  LAB RESULTS:      Lab 23  1423 23  1823   WBC 7.23 10.35   HEMOGLOBIN 15.5 18.4*   HEMATOCRIT 45.0 52.6*   PLATELETS 145 181   NEUTROS ABS 4.48 7.07*   IMMATURE GRANS (ABS)  --  0.05   LYMPHS ABS  --  2.22   MONOS ABS  --  0.73   EOS ABS 0.07 0.16   MCV 93.8 94.4         Lab 23  0641 23  1423 23  1823   SODIUM 136 132* 132*   POTASSIUM 4.1 4.2 4.4   CHLORIDE 107 99 94*   CO2 22.0 23.0 24.0   ANION GAP 7.0 10.0 14.0   BUN 3* 8 6   CREATININE 0.59* 0.52* 0.73*   EGFR 129.0 134.0 120.9   GLUCOSE 92 144* 120*   CALCIUM 8.3* 8.3* 8.5*   MAGNESIUM  --  2.1 2.4         Lab 23  0641 23  1823   TOTAL PROTEIN 5.3* 7.3   ALBUMIN 3.1* 4.6   GLOBULIN 2.2 2.7   ALT (SGPT) 131* 138*   AST (SGOT) 93* 107*   BILIRUBIN 0.5 0.3   ALK PHOS  67 84                     Brief Urine Lab Results     None          Microbiology Results Abnormal     None          No radiology results from the last 24 hrs        Current medications:  Scheduled Meds:carvedilol, 6.25 mg, Oral, BID With Meals  thiamine, 100 mg, Oral, Daily   And  multivitamin, 1 tablet, Oral, Daily   And  folic acid, 1 mg, Oral, Daily  nicotine, 1 patch, Transdermal, Q24H  pantoprazole, 40 mg, Oral, Q AM  QUEtiapine, 50 mg, Oral, Nightly  sodium chloride, 10 mL, Intravenous, Q12H  traZODone, 50 mg, Oral, Nightly      Continuous Infusions:   PRN Meds:.•  LORazepam **OR** LORazepam **OR** LORazepam **OR** LORazepam **OR** LORazepam **OR** LORazepam  •  Morphine  •  nicotine polacrilex  •  ondansetron **OR** ondansetron  •  sodium chloride  •  sodium chloride    Assessment & Plan   Assessment & Plan     Active Hospital Problems    Diagnosis  POA   • **EtOH dependence (HCC) [F10.20]  Unknown   • History of alcoholism (HCC) [F10.21]  Not Applicable   • Tobacco use disorder [F17.200]  Yes   • Essential hypertension [I10]  Yes   • Alcohol withdrawal seizure (HCC) [F10.939, R56.9]  Yes   • Alcoholic hepatitis [K70.10]  Yes      Resolved Hospital Problems   No resolved problems to display.        Brief Hospital Course to date:  Dung Knight is a 37 y.o. male with PMH of alcoholic hepatitis, HTN and tobacco abuse who presents to the ED for alcohol detox.  He has a history of alcohol withdrawal seizures and has been in the ICU in the past for alcohol withdrawal     ETOH Dependence  Hx of Withdrawal Seizures  Alcoholic Hepatitis   -CIWA protocol as well as scheduled Valium  -Seizure precautions  -Continue home seroquel  -Status post maintenance fluids; CMP improving  -Folic acid and thiamine  - Addiction team has seen and given resources for help support group     HTN  -Continue home medications     Tobacco Abuse   -Nicotine patch  -Encourage cessation       Expected Discharge Location and Transportation:  home  Expected Discharge   Expected Discharge Date and Time     Expected Discharge Date Expected Discharge Time    Mar 12, 2023            DVT prophylaxis:  Mechanical DVT prophylaxis orders are present.     AM-PAC 6 Clicks Score (PT): 24 (03/11/23 0800)    CODE STATUS:   Code Status and Medical Interventions:   Ordered at: 03/09/23 0331     Level Of Support Discussed With:    Patient     Code Status (Patient has no pulse and is not breathing):    CPR (Attempt to Resuscitate)     Medical Interventions (Patient has pulse or is breathing):    Full Support       Yanelis Rouse MD  03/11/23

## 2023-03-11 NOTE — PLAN OF CARE
Goal Outcome Evaluation:  Plan of Care Reviewed With: patient        Progress: improving  Outcome Evaluation: PT VSS, NSR, RA sats >92%, A/Ox4. Highest CIWA during shift was a 4, only given scheduled Valium. PT requested IV to be DC due to occlusion, Ok'ed by provider to leave out. Possible DC home tomorrow, will continue to monitor.

## 2023-03-12 ENCOUNTER — READMISSION MANAGEMENT (OUTPATIENT)
Dept: CALL CENTER | Facility: HOSPITAL | Age: 38
End: 2023-03-12
Payer: COMMERCIAL

## 2023-03-12 VITALS
BODY MASS INDEX: 28.25 KG/M2 | OXYGEN SATURATION: 96 % | SYSTOLIC BLOOD PRESSURE: 115 MMHG | DIASTOLIC BLOOD PRESSURE: 75 MMHG | WEIGHT: 180 LBS | HEIGHT: 67 IN | TEMPERATURE: 97.8 F | HEART RATE: 61 BPM | RESPIRATION RATE: 18 BRPM

## 2023-03-12 PROBLEM — R56.9 ALCOHOL WITHDRAWAL SEIZURE: Status: RESOLVED | Noted: 2019-01-28 | Resolved: 2023-03-12

## 2023-03-12 PROBLEM — F10.939 ALCOHOL WITHDRAWAL SEIZURE (HCC): Status: RESOLVED | Noted: 2019-01-28 | Resolved: 2023-03-12

## 2023-03-12 PROCEDURE — 99239 HOSP IP/OBS DSCHRG MGMT >30: CPT | Performed by: STUDENT IN AN ORGANIZED HEALTH CARE EDUCATION/TRAINING PROGRAM

## 2023-03-12 RX ORDER — POLYETHYLENE GLYCOL 3350 17 G
2 POWDER IN PACKET (EA) ORAL
Qty: 48 EACH | Refills: 0 | Status: SHIPPED | OUTPATIENT
Start: 2023-03-12 | End: 2023-03-16

## 2023-03-12 RX ORDER — LANOLIN ALCOHOL/MO/W.PET/CERES
100 CREAM (GRAM) TOPICAL DAILY
Qty: 30 TABLET | Refills: 0 | Status: SHIPPED | OUTPATIENT
Start: 2023-03-12 | End: 2023-03-16

## 2023-03-12 RX ORDER — FOLIC ACID 1 MG/1
1 TABLET ORAL DAILY
Qty: 30 TABLET | Refills: 0 | Status: SHIPPED | OUTPATIENT
Start: 2023-03-12

## 2023-03-12 RX ORDER — NICOTINE 21 MG/24HR
1 PATCH, TRANSDERMAL 24 HOURS TRANSDERMAL
Qty: 28 EACH | Refills: 0 | Status: SHIPPED | OUTPATIENT
Start: 2023-03-12 | End: 2023-03-16

## 2023-03-12 RX ADMIN — THIAMINE HCL TAB 100 MG 100 MG: 100 TAB at 08:53

## 2023-03-12 RX ADMIN — FOLIC ACID 1 MG: 1 TABLET ORAL at 08:53

## 2023-03-12 RX ADMIN — MULTIVITAMIN TABLET 1 TABLET: TABLET at 08:53

## 2023-03-12 RX ADMIN — PANTOPRAZOLE SODIUM 40 MG: 40 TABLET, DELAYED RELEASE ORAL at 08:53

## 2023-03-12 RX ADMIN — CARVEDILOL 6.25 MG: 6.25 TABLET, FILM COATED ORAL at 08:53

## 2023-03-12 RX ADMIN — Medication 1 PATCH: at 08:55

## 2023-03-12 NOTE — OUTREACH NOTE
Prep Survey    Flowsheet Row Responses   [facility] discharged from? [place]   Is LACE score < 7 ? Yes   Eligibility [facility]   Date of Admission 03/08/23   Date of Discharge 03/12/23   Discharge Disposition Home or Self Care   Discharge diagnosis EtOH dependence    Does the patient have one of the following disease processes/diagnoses(primary or secondary)? Other   Does the patient have Home health ordered? No   Is there a DME ordered? No   Comments regarding appointments [id number] @ 934 Dr Kwan   Prep survey completed? Yes          Chloe NICOLE - Registered Nurse

## 2023-03-12 NOTE — DISCHARGE SUMMARY
[facility] Medicine Services  DISCHARGE SUMMARY    Patient Name: Dung Knight  : 1985  MRN: [id number]    Date of Admission: 3/8/2023  6:23 PM  Date of Discharge:  3/12/2023  Primary Care Physician: Han Reyes APRN    Consults     No orders found from 2023 to 3/9/2023.          Hospital Course     Presenting Problem:   Alcohol withdrawal (HCC) [F10.939]  History of alcoholism (HCC) [F10.21]    Active Hospital Problems    Diagnosis  POA   • **EtOH dependence (HCC) [F10.20]  Unknown   • History of alcoholism (HCC) [F10.21]  Not Applicable   • Tobacco use disorder [F17.200]  Yes   • Essential hypertension [I10]  Yes   • Alcoholic hepatitis [K70.10]  Yes      Resolved Hospital Problems    Diagnosis Date Resolved POA   • Alcohol withdrawal seizure (HCC) [F10.939, R56.9] 2023 Yes          Hospital Course:  Dung Knight is a 37 y.o. male with PMH of alcoholic hepatitis, HTN and tobacco abuse who presents to the ED for alcohol detox.  He has a history of alcohol withdrawal seizures and has been in the ICU in the past for alcohol withdrawal. His LFT abnormalities upon admission were consistent w alcoholic hepatitis, which improved with supportive care. He was given folic acid and thiamine. Given his strong history of alcohol dependence, withdrawal seizure and ICU admission for withdrawal he was placed on a scheduled valium taper to assist with withdrawal symptoms in addition to CIWA. His anxiety and tremors improved and is currently >72 hr past last drink with low probability of withdrawal sz during this time frame. Addiction medicine was consulted and provided resources for rehab and alcohol cessation. He will be set up w a PCP. HTN home meds resumed      Discharge Follow Up Recommendations for outpatient labs/diagnostics:   PCP    Day of Discharge     HPI:   No acute overnight events    Review of Systems  Gen- No fevers, chills  CV- No chest pain, palpitations  Resp- No  cough, dyspnea  GI- No N/V/D, abd pain        Vital Signs:   Temp:  [98 °F (36.7 °C)-98.6 °F (37 °C)] 98.3 °F (36.8 °C)  Heart Rate:  [59-91] 61  Resp:  [18] 18  BP: (105-149)/() 105/70      Physical Exam:  Constitutional: No acute distress, awake, alert  HENT: NCAT, mucous membranes moist  Respiratory: Clear to auscultation bilaterally, respiratory effort normal   Cardiovascular: RRR, no murmurs, rubs, or gallops  Gastrointestinal: Positive bowel sounds, soft, nontender, nondistended  Musculoskeletal: No bilateral ankle edema  Psychiatric: Appropriate affect, cooperative  Neurologic: Oriented x 3, strength symmetric in all extremities, Cranial Nerves grossly intact to confrontation, speech clear  Skin: No rashes      Pertinent  and/or Most Recent Results     LAB RESULTS:      Lab 03/09/23  1423 03/08/23  1823   WBC 7.23 10.35   HEMOGLOBIN 15.5 18.4*   HEMATOCRIT 45.0 52.6*   PLATELETS 145 181   NEUTROS ABS 4.48 7.07*   IMMATURE GRANS (ABS)  --  0.05   LYMPHS ABS  --  2.22   MONOS ABS  --  0.73   EOS ABS 0.07 0.16   MCV 93.8 94.4         Lab 03/11/23  0641 03/09/23  1423 03/08/23  1823   SODIUM 136 132* 132*   POTASSIUM 4.1 4.2 4.4   CHLORIDE 107 99 94*   CO2 22.0 23.0 24.0   ANION GAP 7.0 10.0 14.0   BUN 3* 8 6   CREATININE 0.59* 0.52* 0.73*   EGFR 129.0 134.0 120.9   GLUCOSE 92 144* 120*   CALCIUM 8.3* 8.3* 8.5*   MAGNESIUM  --  2.1 2.4         Lab 03/11/23  0641 03/08/23  1823   TOTAL PROTEIN 5.3* 7.3   ALBUMIN 3.1* 4.6   GLOBULIN 2.2 2.7   ALT (SGPT) 131* 138*   AST (SGOT) 93* 107*   BILIRUBIN 0.5 0.3   ALK PHOS 67 84                     Brief Urine Lab Results     None        Microbiology Results (last 10 days)     ** No results found for the last 240 hours. **          No radiology results for the last 10 days                Plan for Follow-up of Pending Labs/Results: no pending results    Discharge Details        Discharge Medications      New Medications      Instructions Start Date   nicotine 21  MG/24HR patch  Commonly known as: NICODERM CQ   1 patch, Transdermal, Every 24 Hours Scheduled      nicotine polacrilex 2 MG lozenge  Commonly known as: COMMIT   2 mg, Mouth/Throat, Every 1 Hour PRN         Continue These Medications      Instructions Start Date   albuterol sulfate  (90 Base) MCG/ACT inhaler  Commonly known as: PROVENTIL HFA;VENTOLIN HFA;PROAIR HFA   2 puffs, Inhalation, Every 4 Hours PRN      bacitracin 500 UNIT/GM ointment   Topical, 2 Times Daily      bacitracin-polymyxin b [id number] UNIT/GM ointment  Commonly known as: POLYSPORIN   Topical, 2 Times Daily      carvedilol 6.25 MG tablet  Commonly known as: Coreg   6.25 mg, Oral, 2 Times Daily With Meals      erythromycin 5 MG/GM ophthalmic ointment  Commonly known as: ROMYCIN   Both Eyes, Every 4 Hours While Awake      erythromycin 5 MG/GM ophthalmic ointment  Commonly known as: ROMYCIN   Both Eyes, Every 4 Hours While Awake      folic acid 1 MG tablet  Commonly known as: FOLVITE   1 mg, Oral, Daily      multivitamin with minerals tablet tablet   1 tablet, Oral, Daily      omeprazole 20 MG capsule  Commonly known as: PrilOSEC   20 mg, Oral, Daily      ondansetron 4 MG tablet  Commonly known as: ZOFRAN   4 mg, Oral, Every 6 Hours PRN      QUEtiapine 50 MG tablet  Commonly known as: SEROquel   50 mg, Oral, Every Night at Bedtime      thiamine 100 MG tablet  Commonly known as: VITAMIN B1   100 mg, Oral, Daily      traZODone 50 MG tablet  Commonly known as: DESYREL   TAKE 1 TABLET BY MOUTH AT BEDTIME          Stop These Medications    acetaminophen 650 MG 8 hr tablet  Commonly known as: Tylenol 8 Hour     HYDROcodone-acetaminophen 5-325 MG per tablet  Commonly known as: NORCO     ibuprofen 600 MG tablet  Commonly known as: ADVIL,MOTRIN            Allergies   Allergen Reactions   • Penicillins Unknown (See Comments)     Unknown reaction         Discharge Disposition:  Home or Self Care    Diet:  Hospital:  Diet Order   Procedures   • Diet:  Cardiac Diets; Healthy Heart (2-3 Na+); Texture: Regular Texture (IDDSI 7); Fluid Consistency: Thin (IDDSI 0)       Activity:  as tolerated    Restrictions or Other Recommendations:  No alcohol       CODE STATUS:    Code Status and Medical Interventions:   Ordered at: 03/09/23 0331     Level Of Support Discussed With:    Patient     Code Status (Patient has no pulse and is not breathing):    CPR (Attempt to Resuscitate)     Medical Interventions (Patient has pulse or is breathing):    Full Support       Future Appointments   Date Time Provider Department Center   3/16/2023  8:45 AM Genaro Kwan MD [facility] ISAEL Rouse MD  03/12/23      Time Spent on Discharge:  I spent  45  minutes on this discharge activity which included: face-to-face encounter with the patient, reviewing the data in the system, coordination of the care with the nursing staff as well as consultants, documentation, and entering orders.

## 2023-03-12 NOTE — NURSING NOTE
PT DC instructions explained. No additional questions, verbalized a good understanding. DC written documentation sent home with PT.

## 2023-03-13 ENCOUNTER — TRANSITIONAL CARE MANAGEMENT TELEPHONE ENCOUNTER (OUTPATIENT)
Dept: CALL CENTER | Facility: HOSPITAL | Age: 38
End: 2023-03-13
Payer: COMMERCIAL

## 2023-03-13 NOTE — OUTREACH NOTE
Call Center TCM Note    Flowsheet Row Responses   [facility] patient discharged from? [place]   Does the patient have one of the following disease processes/diagnoses(primary or secondary)? Other   TCM attempt successful? Yes   Call start time 1010   Call end time 1013   Discharge diagnosis EtOH dependence    Person spoke with today (if not patient) and relationship patient   Meds reviewed with patient/caregiver? Yes   Is the patient having any side effects they believe may be caused by any medication additions or changes? Yes   Does the patient have all medications ordered at discharge? Yes   Is the patient taking all medications as directed (includes completed medication regime)? Yes   Comments Patient has a new patient hospital followup appt on 3/16/2023   Does the patient have an appointment with their PCP within 7 days of discharge? Yes   Psychosocial issues? No   Did the patient receive a copy of their discharge instructions? Yes   Nursing interventions Reviewed instructions with patient   What is the patient's perception of their health status since discharge? Improving   Is the patient/caregiver able to teach back signs and symptoms related to disease process for when to call PCP? Yes   Is the patient/caregiver able to teach back signs and symptoms related to disease process for when to call 911? Yes   Is the patient/caregiver able to teach back the hierarchy of who to call/visit for symptoms/problems? PCP, Specialist, Home health nurse, Urgent Care, ED, 911 Yes   If the patient is a current smoker, are they able to teach back resources for cessation? Not a smoker   TCM call completed? Yes   Wrap up additional comments Denies any needs. He reports doing well.    Call end time 1013   Would this patient benefit from a Referral to Amb Social Work? No   Is the patient interested in additional calls from an ambulatory ?  NOTE:  applies to high risk patients requiring additional follow-up. No           Wali Webb RN    3/13/2023, 10:13 EDT

## 2023-03-16 ENCOUNTER — TELEPHONE (OUTPATIENT)
Dept: INTERNAL MEDICINE | Facility: CLINIC | Age: 38
End: 2023-03-16

## 2023-03-16 ENCOUNTER — OFFICE VISIT (OUTPATIENT)
Dept: INTERNAL MEDICINE | Facility: CLINIC | Age: 38
End: 2023-03-16
Payer: COMMERCIAL

## 2023-03-16 VITALS
HEART RATE: 99 BPM | OXYGEN SATURATION: 98 % | BODY MASS INDEX: 27 KG/M2 | RESPIRATION RATE: 15 BRPM | HEIGHT: 67 IN | SYSTOLIC BLOOD PRESSURE: 140 MMHG | TEMPERATURE: 98.4 F | DIASTOLIC BLOOD PRESSURE: 88 MMHG | WEIGHT: 172 LBS

## 2023-03-16 DIAGNOSIS — I10 ESSENTIAL HYPERTENSION: Primary | ICD-10-CM

## 2023-03-16 DIAGNOSIS — G47.00 INSOMNIA, UNSPECIFIED TYPE: ICD-10-CM

## 2023-03-16 DIAGNOSIS — K70.10 ALCOHOLIC HEPATITIS WITHOUT ASCITES: ICD-10-CM

## 2023-03-16 DIAGNOSIS — F90.0 ATTENTION DEFICIT HYPERACTIVITY DISORDER (ADHD), PREDOMINANTLY INATTENTIVE TYPE: ICD-10-CM

## 2023-03-16 DIAGNOSIS — F41.9 ANXIETY: ICD-10-CM

## 2023-03-16 DIAGNOSIS — D69.6 THROMBOCYTOPENIA: ICD-10-CM

## 2023-03-16 DIAGNOSIS — F17.200 TOBACCO USE DISORDER: ICD-10-CM

## 2023-03-16 DIAGNOSIS — R79.89 LOW SERUM CALCIUM: ICD-10-CM

## 2023-03-16 DIAGNOSIS — K21.9 GASTROESOPHAGEAL REFLUX DISEASE, UNSPECIFIED WHETHER ESOPHAGITIS PRESENT: ICD-10-CM

## 2023-03-16 DIAGNOSIS — K21.00 GASTROESOPHAGEAL REFLUX DISEASE WITH ESOPHAGITIS WITHOUT HEMORRHAGE: ICD-10-CM

## 2023-03-16 DIAGNOSIS — F10.21 HISTORY OF ALCOHOLISM: ICD-10-CM

## 2023-03-16 PROBLEM — Z71.6 ENCOUNTER FOR SMOKING CESSATION COUNSELING: Status: RESOLVED | Noted: 2019-02-08 | Resolved: 2023-03-16

## 2023-03-16 PROCEDURE — 99495 TRANSJ CARE MGMT MOD F2F 14D: CPT | Performed by: INTERNAL MEDICINE

## 2023-03-16 RX ORDER — ALBUTEROL SULFATE 90 UG/1
2 AEROSOL, METERED RESPIRATORY (INHALATION) EVERY 4 HOURS PRN
Qty: 18 G | Refills: 1 | Status: SHIPPED | OUTPATIENT
Start: 2023-03-16

## 2023-03-16 RX ORDER — OMEPRAZOLE 40 MG/1
40 CAPSULE, DELAYED RELEASE ORAL DAILY
Qty: 30 CAPSULE | Refills: 2
Start: 2023-03-16

## 2023-03-16 RX ORDER — QUETIAPINE FUMARATE 50 MG/1
50 TABLET, FILM COATED ORAL
Qty: 20 TABLET | Refills: 0 | Status: SHIPPED | OUTPATIENT
Start: 2023-03-16

## 2023-03-16 NOTE — PROGRESS NOTES
Transitional Care Follow Up Visit  Subjective     Dung Knight is a 37 y.o. male who presents for a transitional care management visit.    Within 48 business hours after discharge our office contacted him via telephone to coordinate his care and needs.      I reviewed and discussed the details of that call along with the discharge summary, hospital problems, inpatient lab results, inpatient diagnostic studies, and consultation reports with Dung.     Current outpatient and discharge medications have been reconciled for the patient.  Reviewed by: Genaro Kwan MD      Date of TCM Phone Call 3/12/2023   [facility]   Date of Admission 3/8/2023   Date of Discharge 3/12/2023   Discharge Disposition Home or Self Care     Risk for Readmission (LACE) Score: 9 (3/12/2023  6:00 AM)      History of Present Illness   Course During Hospital Stay:  Patient here for hospital follow-up.  Patient was discharged 6 days ago for alcohol withdrawal.  Patient is not drinking alcohol for 5 days.  Blood test showed liver enzyme elevated sodium low calcium low albumin low.  Patient also has history of anxiety and ADD.  Patient also smokes tobacco.  Patient also has a rash after the hospital both arms.  Insomnia improved on medication.     The following portions of the patient's history were reviewed and updated as appropriate: allergies, current medications, past family history, past medical history, past social history, past surgical history and problem list.    Review of Systems   Constitutional: Negative.    Respiratory: Negative.    Cardiovascular: Negative.    Gastrointestinal: Negative.    Musculoskeletal: Negative.    Skin: Negative.    Neurological: Negative.    Psychiatric/Behavioral: The patient is nervous/anxious.        Objective   Physical Exam  Cardiovascular:      Rate and Rhythm: Normal rate and regular rhythm.      Heart sounds: Normal heart sounds.   Pulmonary:      Effort: Pulmonary effort is normal.      Breath  sounds: Normal breath sounds.   Abdominal:      General: Bowel sounds are normal.   Musculoskeletal:      Cervical back: Neck supple.   Skin:     General: Skin is warm.   Neurological:      Mental Status: He is alert and oriented to person, place, and time.   Psychiatric:      Comments: Anxious         Assessment & Plan   Diagnoses and all orders for this visit:    1. Essential hypertension (Primary) slightly elevated continue to watch for now.  Lower the salt check lab  -     Lipid Panel  -     TSH    2. Insomnia, unspecified type continue medication continue trazodone  -     QUEtiapine (SEROquel) 50 MG tablet; Take 1 tablet by mouth every night at bedtime.  Dispense: 20 tablet; Refill: 0    3. Anxiety and the sertraline  -     QUEtiapine (SEROquel) 50 MG tablet; Take 1 tablet by mouth every night at bedtime.  Dispense: 20 tablet; Refill: 0  -     Ambulatory Referral to Psychiatry  -     sertraline (Zoloft) 50 MG tablet; Take 1 tablet by mouth Daily.  Dispense: 30 tablet; Refill: 1    4. Alcoholic hepatitis without ascites check lab  -     Comprehensive Metabolic Panel  -     Ethanol level    5. Thrombocytopenia (HCC) check lab  -     CBC & Differential    6. Tobacco use disorder patient is not ready to quit    7. History of alcoholism (HCC) encouraged for maintenance program patient is going to call  -     Urine Drug Screen - Urine, Clean Catch    9. Low serum calcium repeat  -     Calcium, Ionized    10. Attention deficit hyperactivity disorder (ADHD), predominantly inattentive type  -     Ambulatory Referral to Psychiatry    11. Gastroesophageal reflux disease, unspecified whether esophagitis present initiate medication  -     omeprazole (priLOSEC) 40 MG capsule; Take 1 capsule by mouth Daily.  Dispense: 30 capsule; Refill: 2    Other orders  -     albuterol sulfate  (90 Base) MCG/ACT inhaler; Inhale 2 puffs Every 4 (Four) Hours As Needed for Wheezing.  Dispense: 18 g; Refill: 1      1 mo after blood  tests

## 2023-03-16 NOTE — TELEPHONE ENCOUNTER
Caller: Dung Knight    Relationship: Self    Best call back number: [phone]    What form or medical record are you requesting: DOCTOR'S NOTE: 3/16/23-3/21-23- RETURN TO WORK DATE     Who is requesting this form or medical record from you: PATIENT     How would you like to receive the form or medical records (pick-up, mail, fax):    If fax, what is the fax number  If mail, what is the address:  If pick-up, provide patient with address and location details    Timeframe paperwork needed: 3/17/23    Additional notes: PATIENT IS DROPPING OFF FMLA PAPERS TOMORROW AND ASKED IF HE CAN  HIS DOCTOR'S NOTE AT THAT TIME

## 2023-03-27 ENCOUNTER — TELEPHONE (OUTPATIENT)
Dept: INTERNAL MEDICINE | Facility: CLINIC | Age: 38
End: 2023-03-27

## 2023-03-27 NOTE — PROGRESS NOTES
Enter Query Response Below      Query Response: suspected thiamine deficiency due to chronic alcohol use             If applicable, please update the problem list.     Patient: Dung Knight        : 1985  Account: [id number]           Admit Date: 3/8/2023        How to Respond to this query:       a. Click New Note     b. Answer query within the yellow box.                c. Update the Problem List, if applicable.      If you have any questions about this query contact me at: [phone]     Dr. Rouse:    37 y.o. male with history of alcoholic hepatitis, HTN and tobacco abuse  presented for alcohol detox.  He reported current alcohol use of about 21.0 standard drinks per week with last drink being while in the parking lot of the ED.  Lab included blood alcohol level of 240.  Patient given thiamine IV in ED and continued thiamine po during stay.     Can this be further specified as:    - suspected thiamine deficiency due to chronic alcohol use  - prophylatic use only  - other (specify) ___________  - unable to determine       By submitting this query, we are merely seeking further clarification of documentation to accurately reflect all conditions that you are monitoring, evaluating, treating or that extend the hospitalization or utilize additional resources of care. Please utilize your independent clinical judgment when addressing the question(s) above.     This query and your response, once completed, will be entered into the legal medical record.    Sincerely,  Johnna Alberto RN, MSN  Clinical Documentation Integrity Program   [email or web address]

## 2023-04-07 ENCOUNTER — APPOINTMENT (OUTPATIENT)
Dept: GENERAL RADIOLOGY | Facility: HOSPITAL | Age: 38
End: 2023-04-07
Payer: COMMERCIAL

## 2023-04-07 ENCOUNTER — HOSPITAL ENCOUNTER (EMERGENCY)
Facility: HOSPITAL | Age: 38
Discharge: HOME | End: 2023-04-07
Attending: EMERGENCY MEDICINE | Admitting: EMERGENCY MEDICINE
Payer: COMMERCIAL

## 2023-04-07 VITALS
TEMPERATURE: 98.4 F | DIASTOLIC BLOOD PRESSURE: 91 MMHG | RESPIRATION RATE: 16 BRPM | HEIGHT: 67 IN | WEIGHT: 183 LBS | OXYGEN SATURATION: 100 % | HEART RATE: 98 BPM | BODY MASS INDEX: 28.72 KG/M2 | SYSTOLIC BLOOD PRESSURE: 145 MMHG

## 2023-04-07 DIAGNOSIS — S62.336A DISPLACED FRACTURE OF NECK OF FIFTH METACARPAL BONE, RIGHT HAND, INITIAL ENCOUNTER FOR CLOSED FRACTURE: Primary | ICD-10-CM

## 2023-04-07 PROCEDURE — 99283 EMERGENCY DEPT VISIT LOW MDM: CPT

## 2023-04-07 PROCEDURE — 63710000001 ONDANSETRON ODT 4 MG TABLET DISPERSIBLE: Performed by: PHYSICIAN ASSISTANT

## 2023-04-07 PROCEDURE — 73130 X-RAY EXAM OF HAND: CPT

## 2023-04-07 RX ORDER — HYDROCODONE BITARTRATE AND ACETAMINOPHEN 5; 325 MG/1; MG/1
1 TABLET ORAL ONCE
Status: COMPLETED | OUTPATIENT
Start: 2023-04-07 | End: 2023-04-07

## 2023-04-07 RX ORDER — ONDANSETRON 4 MG/1
4 TABLET, ORALLY DISINTEGRATING ORAL ONCE
Status: COMPLETED | OUTPATIENT
Start: 2023-04-07 | End: 2023-04-07

## 2023-04-07 RX ORDER — HYDROCODONE BITARTRATE AND ACETAMINOPHEN 5; 325 MG/1; MG/1
1 TABLET ORAL EVERY 6 HOURS PRN
Qty: 12 TABLET | Refills: 0 | Status: SHIPPED | OUTPATIENT
Start: 2023-04-07 | End: 2023-04-10

## 2023-04-07 RX ADMIN — HYDROCODONE BITARTRATE AND ACETAMINOPHEN 1 TABLET: 5; 325 TABLET ORAL at 22:34

## 2023-04-07 RX ADMIN — ONDANSETRON 4 MG: 4 TABLET, ORALLY DISINTEGRATING ORAL at 22:34

## 2023-04-07 NOTE — Clinical Note
[facility] EMERGENCY DEPARTMENT  [address] [address]  Phone: [phone]    Dung Knight was seen and treated in our emergency department on 4/7/2023.  He may return to work on 04/14/2023.  Dung was seen in our er [facility]. He may return to work in 1 week or as tolerated.       Thank you for choosing [facility].    Luis Read RN

## 2023-04-08 NOTE — DISCHARGE INSTRUCTIONS
You have a fracture of the distal right fifth metacarpal.  Keep splint clean, dry and in place until you follow-up with orthopedic surgery.  Use sling as needed for comfort.  Rest, ice and elevate to help with any pain or swelling.  Take Norco as needed for severe pain.  You will need to follow-up with orthopedic surgery, can contact Dr. Lion's office for earliest available appointment.  They can help further manage this injury.  Follow-up with your primary care provider as needed.  Return to the ER for any change, worsening of symptoms, or any additional concerns including but not limited to severe or worsening pain, extremity tingling, or other concerns.

## 2023-04-08 NOTE — ED PROVIDER NOTES
Subjective  History of Present Illness:    Chief Complaint: Hand injury  History of Present Illness: Patient is a 37-year-old right-hand-dominant male with history of ADHD, anxiety, asthma, hypertension, seizures and scoliosis presenting to the ER for evaluation of right hand injury.  Patient states he was helping a family member move today.  He states he tripped over some boxes and landed awkwardly on his right hand.  He states that since then he has had pain on the ulnar aspect of his hand, difficulty extending the right fifth digit.  He can flex the finger.  He has taken ibuprofen for pain.  He denies any other injury or trauma.  Onset: Today  Duration: Persistent   Exacerbating / Alleviating factors: None  Associated symptoms: None      Nurses Notes reviewed and agree, including vitals, allergies, social history and prior medical history.     REVIEW OF SYSTEMS: All systems reviewed and not pertinent unless noted.  Review of Systems       Positive for:  Joint pain, joint swelling    Negative for: Bruising, fever, chills, other color changes    Past Medical History:   Diagnosis Date   • ADHD (attention deficit hyperactivity disorder) Childhood   • Allergic Childhood    Penicillin   • Anxiety Childhood   • Arthritis Unknown   • Asthma    • Depression Early 20’s   • Headache Childhood   • HL (hearing loss) Childhood   • Hypertension    • Infectious viral hepatitis 2018   • Low back pain 2017   • Scoliosis    • Seizures        Allergies:    Penicillins      Past Surgical History:   Procedure Laterality Date   • COLONOSCOPY     • HERNIA REPAIR     • TESTICLE SURGERY           Social History     Socioeconomic History   • Marital status: Single   Tobacco Use   • Smoking status: Every Day     Packs/day: 2.50     Years: 10.00     Pack years: 25.00     Types: Cigarettes     Start date: 2/7/2004   • Smokeless tobacco: Never   Vaping Use   • Vaping Use: Some days   • Substances: Nicotine   • Devices: Disposable   • Passive  "vaping exposure: Yes   Substance and Sexual Activity   • Alcohol use: Yes     Alcohol/week: 140.0 standard drinks     Types: 140 Cans of beer per week   • Drug use: Not Currently     Types: Marijuana   • Sexual activity: Yes     Partners: Female         Family History   Problem Relation Age of Onset   • Arthritis Mother    • Arthritis Father    • Arthritis Maternal Grandfather    • Arthritis Maternal Grandmother    • Alcohol abuse Paternal Grandfather    • Alcohol abuse Maternal Uncle    • Alcohol abuse Maternal Uncle    • Asthma Sister        Objective  Physical Exam:  /91 (BP Location: Right arm)   Pulse 98   Temp 98.4 °F (36.9 °C) (Oral)   Resp 16   Ht 170.2 cm (67\")   Wt 83 kg (183 lb)   SpO2 100%   BMI 28.66 kg/m²      Physical Exam    CONSTITUTIONAL: Well developed, no acute distress  VITAL SIGNS: per nursing, reviewed and noted  SKIN: exposed skin with no rashes, ulcerations or petechiae  EYES: Grossly EOMI, no icterus  ENT: Normal voice.  No facial asymmetry   RESPIRATORY:  No increased work of breathing. No retractions. Lung sounds clear to ausculation    CARDIOVASCULAR:  regular rate and rhythm.   MUSCULOSKELETAL: Patient has significant swelling to the ulnar aspect of his right hand.  He can fully flex all digits, no significant rotation.  Capillary refill less than 2 seconds.  Radial pulses 2+  NEUROLOGIC: Alert, oriented x 3. No gross deficits. GCS 15.   PSYCH: appropriate affect.    Splint - Cast - Strapping    Date/Time: 4/7/2023 10:35 PM  Performed by: Tanesha Shea PA-C  Authorized by: Kendra Jack MD     Consent:     Consent obtained:  Verbal    Consent given by:  Patient    Risks discussed:  Discoloration, numbness, pain and swelling  Universal protocol:     Imaging studies available: yes      Patient identity confirmed:  Verbally with patient  Pre-procedure details:     Distal neurologic exam:  Normal    Distal perfusion: distal pulses strong and brisk capillary refill "    Procedure details:     Location:  Hand    Hand location:  R hand    Splint type:  Ulnar gutter    Supplies used: Orthoglass.    Attestation: Splint applied and adjusted personally by me    Post-procedure details:     Distal neurologic exam:  Normal    Distal perfusion: brisk capillary refill      Procedure completion:  Tolerated    Post-procedure imaging: not applicable          ED Course:        ED Course as of 04/07/23 2247 Fri Apr 07, 2023 2219 Reviewed x-ray with Dr. Jack.  Patient has a distal fifth metacarpal fracture, no significant angulation.  Do not believe that he needed any reduction, will place in splint with orthopedic surgery follow-up. [LA]      ED Course User Index  [LA] Tanesha Shea PA-C       Lab Results (last 24 hours)     ** No results found for the last 24 hours. **           No radiology results from the last 24 hrs       MDM    Initial impression of presenting illness: Patient is right-hand-dominant male presenting to the ER for evaluation of hand injury.    DDX: includes but is not limited to: Fracture, dislocation, tendon or ligament injury    Patient arrives in stable condition with vitals interpreted by myself.     Pertinent features from physical exam: Significant swelling to the ulnar aspect of the right hand, neurovascularly intact    Initial diagnostic plan: We will obtain x-rays for further evaluation    Results from initial plan were reviewed and interpreted by me revealing what appears to be a distal fifth metacarpal fracture with mild angulation.  Reviewed with Dr. Jack.  Did not believe this injury needed reduction.    Diagnostic information from other sources: Chart review    Interventions / Re-evaluation: Patient main stable throughout visit.  He was given Norco and Zofran here, splint was placed per procedure note. He was given a sling to wear as needed as well.     Results/clinical rationale were discussed with patient.  Discussed follow-up with orthopedic  surgery, strict return precautions to the ER, splint care.  He verbalized understanding and was in agreement with this plan of care.    Consultations/Discussion of results with other physicians: Dr. Jack    Disposition plan: Discharge  -----    Final diagnoses:   Displaced fracture of neck of fifth metacarpal bone, right hand, initial encounter for closed fracture        Tanesha Shea PA-C  04/07/23 3904

## 2023-05-30 ENCOUNTER — TELEPHONE (OUTPATIENT)
Dept: INTERNAL MEDICINE | Facility: CLINIC | Age: 38
End: 2023-05-30

## 2023-05-30 DIAGNOSIS — I10 ESSENTIAL HYPERTENSION: ICD-10-CM

## 2023-05-30 RX ORDER — CARVEDILOL 6.25 MG/1
6.25 TABLET ORAL 2 TIMES DAILY WITH MEALS
Qty: 180 TABLET | Refills: 3 | Status: SHIPPED | OUTPATIENT
Start: 2023-05-30

## 2023-05-30 NOTE — TELEPHONE ENCOUNTER
Caller: Dung Knight    Relationship: Self    Best call back number: [phone]    Requested Prescriptions:   Requested Prescriptions     Pending Prescriptions Disp Refills   • carvedilol (Coreg) 6.25 MG tablet 60 tablet 5     Sig: Take 1 tablet by mouth 2 (Two) Times a Day With Meals.        Pharmacy where request should be sent: [id number], [place] - [address] - [phone] [facility] [phone]      Last office visit with prescribing clinician: 3/16/2023   Last telemedicine visit with prescribing clinician: Visit date not found   Next office visit with prescribing clinician: Visit date not found     Additional details provided by patient: PATIENT STATES THIS NEEDS TO BE SENT IN AS 90 DAY SUPPLY  PATIENT IS COMPLETELY OUT    Does the patient have less than a 3 day supply:  [x] Yes  [] No    Would you like a call back once the refill request has been completed: [] Yes [] No    If the office needs to give you a call back, can they leave a voicemail: [] Yes [] No    Tomás Nazario Rep   05/30/23 13:28 EDT

## 2024-03-08 ENCOUNTER — HOSPITAL ENCOUNTER (EMERGENCY)
Facility: HOSPITAL | Age: 39
Discharge: HOME | End: 2024-03-08
Attending: STUDENT IN AN ORGANIZED HEALTH CARE EDUCATION/TRAINING PROGRAM
Payer: COMMERCIAL

## 2024-03-08 VITALS
DIASTOLIC BLOOD PRESSURE: 87 MMHG | HEIGHT: 67 IN | TEMPERATURE: 98.3 F | BODY MASS INDEX: 32.96 KG/M2 | SYSTOLIC BLOOD PRESSURE: 143 MMHG | WEIGHT: 210 LBS | HEART RATE: 90 BPM | RESPIRATION RATE: 16 BRPM | OXYGEN SATURATION: 97 %

## 2024-03-08 DIAGNOSIS — S91.311A LACERATION OF DORSUM OF RIGHT FOOT: Primary | ICD-10-CM

## 2024-03-08 PROCEDURE — 99282 EMERGENCY DEPT VISIT SF MDM: CPT

## 2024-03-08 PROCEDURE — 90715 TDAP VACCINE 7 YRS/> IM: CPT

## 2024-03-08 PROCEDURE — 25010000002 TETANUS-DIPHTH-ACELL PERTUSSIS 5-2.5-18.5 LF-MCG/0.5 SUSPENSION PREFILLED SYRINGE

## 2024-03-08 PROCEDURE — 90471 IMMUNIZATION ADMIN: CPT

## 2024-03-08 PROCEDURE — 25010000002 LIDOCAINE 1 % SOLUTION

## 2024-03-08 RX ORDER — LIDOCAINE HYDROCHLORIDE 10 MG/ML
10 INJECTION, SOLUTION INFILTRATION; PERINEURAL ONCE
Status: COMPLETED | OUTPATIENT
Start: 2024-03-08 | End: 2024-03-08

## 2024-03-08 RX ADMIN — LIDOCAINE HYDROCHLORIDE 10 ML: 10 INJECTION, SOLUTION INFILTRATION; PERINEURAL at 13:24

## 2024-03-08 RX ADMIN — TETANUS TOXOID, REDUCED DIPHTHERIA TOXOID AND ACELLULAR PERTUSSIS VACCINE, ADSORBED 0.5 ML: 5; 2.5; 8; 8; 2.5 SUSPENSION INTRAMUSCULAR at 13:21

## 2024-03-08 NOTE — Clinical Note
[facility] EMERGENCY DEPARTMENT  [address] [address]  Phone: [phone]    Dung Knight was seen and treated in our emergency department on 3/8/2024.  He may return to work on 03/09/2024.         Thank you for choosing [facility].    Baldo Reveles PA-C

## 2024-03-08 NOTE — DISCHARGE INSTRUCTIONS
Keep the wound clean and dry for 24 hours.  Afterwards you can shower once daily.  Return to the ER for any acute signs of worsening of condition such as swelling or redness around the wound, drainage of pus from the wound, or for any other concern.    Your sutures will need to be removed in 10 to 14 days.

## 2024-03-08 NOTE — ED PROVIDER NOTES
EMERGENCY DEPARTMENT ENCOUNTER    Pt Name: Dung Knight  MRN: [id number]  Pt :   1985  Room Number:  CDU1/01  Date of encounter:  3/8/2024  PCP: Provider, No Known  ED Provider: Baldo Reveles PA-C    Historian: Patient      HPI:  Chief Complaint: Laceration of right foot        Context: Dung Knight is a 38 y.o. male who presents to the ED c/o a laceration on the dorsal aspect of his right foot that occurred 1 hour prior to arrival.  Patient is unsure of his last tetanus.  Patient denies any pain in his right foot difficulty walking, numbness or tingling, or any other complaint.      PAST MEDICAL HISTORY  Past Medical History:   Diagnosis Date    ADHD (attention deficit hyperactivity disorder) Childhood    Allergic Childhood    Penicillin    Anxiety Childhood    Arthritis Unknown    Asthma     Depression Early 20’s    Headache Childhood    HL (hearing loss) Childhood    Hypertension     Infectious viral hepatitis 2018    Low back pain 2017    Scoliosis     Seizures          PAST SURGICAL HISTORY  Past Surgical History:   Procedure Laterality Date    COLONOSCOPY      HERNIA REPAIR      TESTICLE SURGERY           FAMILY HISTORY  Family History   Problem Relation Age of Onset    Arthritis Mother     Arthritis Father     Arthritis Maternal Grandfather     Arthritis Maternal Grandmother     Alcohol abuse Paternal Grandfather     Alcohol abuse Maternal Uncle     Alcohol abuse Maternal Uncle     Asthma Sister          SOCIAL HISTORY  Social History     Socioeconomic History    Marital status: Single   Tobacco Use    Smoking status: Every Day     Current packs/day: 0.50     Average packs/day: 0.5 packs/day for 20.1 years (10.0 ttl pk-yrs)     Types: Cigarettes     Start date: 2004    Smokeless tobacco: Never   Vaping Use    Vaping status: Some Days    Substances: Nicotine    Devices: Disposable    Passive vaping exposure: Yes   Substance and Sexual Activity    Alcohol use: Not Currently     Alcohol/week: 140.0  standard drinks of alcohol     Types: 140 Cans of beer per week    Drug use: Not Currently     Types: Marijuana    Sexual activity: Yes     Partners: Female         ALLERGIES  Penicillins        REVIEW OF SYSTEMS  Review of Systems   Constitutional:  Negative for chills and fever.   HENT:  Negative for congestion and sore throat.    Respiratory:  Negative for cough and shortness of breath.    Cardiovascular:  Negative for chest pain.   Gastrointestinal:  Negative for abdominal pain, nausea and vomiting.   Genitourinary:  Negative for dysuria.   Musculoskeletal:  Negative for back pain.   Skin:  Positive for wound.   Neurological:  Negative for dizziness and headaches.   Psychiatric/Behavioral:  Negative for confusion.    All other systems reviewed and are negative.       All systems reviewed and negative except for those discussed in HPI.       PHYSICAL EXAM    I have reviewed the triage vital signs and nursing notes.    ED Triage Vitals [03/08/24 1230]   Temp Heart Rate Resp BP SpO2   98.3 °F (36.8 °C) 90 16 143/87 97 %      Temp src Heart Rate Source Patient Position BP Location FiO2 (%)   Oral Monitor Sitting Left arm --       Physical Exam  Vitals and nursing note reviewed.   Constitutional:       General: He is not in acute distress.     Appearance: Normal appearance. He is not ill-appearing, toxic-appearing or diaphoretic.   HENT:      Head: Normocephalic and atraumatic.      Right Ear: External ear normal.      Left Ear: External ear normal.      Nose: No congestion or rhinorrhea.      Mouth/Throat:      Mouth: Mucous membranes are moist.      Pharynx: Oropharynx is clear.   Eyes:      Conjunctiva/sclera: Conjunctivae normal.   Cardiovascular:      Rate and Rhythm: Normal rate.      Heart sounds: Normal heart sounds.   Pulmonary:      Effort: Pulmonary effort is normal. No respiratory distress.      Breath sounds: Normal breath sounds. No wheezing.   Abdominal:      Tenderness: There is no abdominal  tenderness.   Musculoskeletal:      Cervical back: Normal range of motion and neck supple.      Right lower leg: No edema.      Left lower leg: No edema.        Feet:    Skin:     Findings: No rash.   Neurological:      Mental Status: He is alert.             LAB RESULTS  No results found for this or any previous visit (from the past 24 hour(s)).    If labs were ordered, I independently reviewed the results and considered them in treating the patient.        RADIOLOGY  No Radiology Exams Resulted Within Past 24 Hours    I ordered and independently reviewed the above noted radiographic studies.      I viewed images of n/a which showed n/a per my independent interpretation.    See radiologist's dictation for official interpretation.        PROCEDURES    Laceration Repair    Date/Time: 3/8/2024 1:37 PM    Performed by: Baldo Reveles PA-C  Authorized by: Zachery Auguste MD    Consent:     Consent obtained:  Verbal    Consent given by:  Patient    Risks, benefits, and alternatives were discussed: yes      Risks discussed:  Pain, infection, poor cosmetic result and poor wound healing    Alternatives discussed:  No treatment  Universal protocol:     Procedure explained and questions answered to patient or proxy's satisfaction: yes      Imaging studies available: no      Patient identity confirmed:  Arm band  Anesthesia:     Anesthesia method:  Local infiltration    Local anesthetic:  Lidocaine 1% w/o epi  Laceration details:     Location:  Foot    Foot location:  Top of R foot    Length (cm):  1.6    Depth (mm):  5  Pre-procedure details:     Preparation:  Patient was prepped and draped in usual sterile fashion  Exploration:     Limited defect created (wound extended): no      Imaging outcome: foreign body not noted      Wound exploration: wound explored through full range of motion      Contaminated: no    Treatment:     Area cleansed with:  Chlorhexidine    Amount of cleaning:  Standard    Irrigation solution:   Sterile saline    Irrigation volume:  500 ml    Irrigation method:  Pressure wash    Visualized foreign bodies/material removed: no    Skin repair:     Repair method:  Sutures    Suture size:  5-0    Suture material:  Nylon    Suture technique:  Simple interrupted    Number of sutures:  3  Approximation:     Approximation:  Close  Repair type:     Repair type:  Simple  Post-procedure details:     Dressing:  Non-adherent dressing    Procedure completion:  Tolerated well, no immediate complications      No orders to display       MEDICATIONS GIVEN IN ER    Medications   Tetanus-Diphth-Acell Pertussis (BOOSTRIX) injection 0.5 mL (0.5 mL Intramuscular Given 3/8/24 1321)   lidocaine (XYLOCAINE) 1 % injection 10 mL (10 mL Injection Given 3/8/24 1324)         MEDICAL DECISION MAKING, PROGRESS, and CONSULTS    All labs, if obtained, have been independently reviewed by me.  All radiology studies, if obtained, have been reviewed by me and the radiologist dictating the report.  All EKG's, if obtained, have been independently viewed and interpreted by me/my attending physician.      Discussion below represents my analysis of pertinent findings related to patient's condition, differential diagnosis, treatment plan and final disposition.    1.6 cm laceration very superficial on the dorsum of the right foot.  No obvious signs of contamination or no foreign bodies.  Patient is having no foot pain  No indication for x-ray imaging at this time.  Tdap updated in the ED.  Laceration repair procedure performed by myself 3 simple interrupted sutures were placed successfully.  Give patient ED return precautions for signs of wound infection along with aftercare instructions and advised him to return to the ER to have them removed in 10 to 14 days.                     Differential diagnosis:    Differential diagnosis included was limited to laceration, abrasion      Additional sources:    - Discussed/ obtained information from independent  historians: Not applicable    - External (non-ED) record review: None    - Chronic or social conditions impacting care: None    - Shared decision making: Not applicable      Orders placed during this visit:  Orders Placed This Encounter   Procedures    Laceration Repair         Additional orders considered but not ordered:  Not applicable    ED Course:    Consultants:                  Shared Decision Making:  After my consideration of clinical presentation and any laboratory/radiology studies obtained, I discussed the findings with the patient/patient representative who is in agreement with the treatment plan and the final disposition.   Risks and benefits of discharge and/or observation/admission were discussed.       AS OF 13:39 EST VITALS:    BP - 143/87  HR - 90  TEMP - 98.3 °F (36.8 °C) (Oral)  O2 SATS - 97%                  DIAGNOSIS  Final diagnoses:   Laceration of dorsum of right foot         DISPOSITION  Discharge home      Please note that portions of this document were completed with voice recognition software.        Baldo Reveles PA-C  03/08/24 [id number]